# Patient Record
Sex: MALE | Race: WHITE | Employment: OTHER | URBAN - METROPOLITAN AREA
[De-identification: names, ages, dates, MRNs, and addresses within clinical notes are randomized per-mention and may not be internally consistent; named-entity substitution may affect disease eponyms.]

---

## 2017-06-21 PROBLEM — C44.92 SCCA (SQUAMOUS CELL CARCINOMA) OF SKIN: Status: ACTIVE | Noted: 2017-06-21

## 2017-06-21 PROBLEM — C44.92 SCCA (SQUAMOUS CELL CARCINOMA) OF SKIN: Status: RESOLVED | Noted: 2017-06-21 | Resolved: 2017-06-21

## 2017-06-21 PROBLEM — L57.0 AK (ACTINIC KERATOSIS): Status: ACTIVE | Noted: 2017-06-21

## 2018-01-26 ENCOUNTER — APPOINTMENT (OUTPATIENT)
Dept: GENERAL RADIOLOGY | Age: 83
DRG: 247 | End: 2018-01-26
Payer: MEDICARE

## 2018-01-26 ENCOUNTER — HOSPITAL ENCOUNTER (INPATIENT)
Age: 83
LOS: 2 days | Discharge: HOME | DRG: 247 | End: 2018-01-28
Attending: EMERGENCY MEDICINE | Admitting: INTERNAL MEDICINE
Payer: MEDICARE

## 2018-01-26 DIAGNOSIS — I21.3 ST ELEVATION MYOCARDIAL INFARCTION (STEMI), UNSPECIFIED ARTERY (HCC): Primary | ICD-10-CM

## 2018-01-26 LAB
ANION GAP SERPL CALCULATED.3IONS-SCNC: 13 MMOL/L (ref 9–17)
BNP INTERPRETATION: ABNORMAL
BUN BLDV-MCNC: 21 MG/DL (ref 8–23)
BUN/CREAT BLD: ABNORMAL (ref 9–20)
CALCIUM SERPL-MCNC: 9.4 MG/DL (ref 8.6–10.4)
CHLORIDE BLD-SCNC: 102 MMOL/L (ref 98–107)
CO2: 22 MMOL/L (ref 20–31)
CREAT SERPL-MCNC: 1.23 MG/DL (ref 0.7–1.2)
GFR AFRICAN AMERICAN: >60 ML/MIN
GFR NON-AFRICAN AMERICAN: 56 ML/MIN
GFR SERPL CREATININE-BSD FRML MDRD: ABNORMAL ML/MIN/{1.73_M2}
GFR SERPL CREATININE-BSD FRML MDRD: ABNORMAL ML/MIN/{1.73_M2}
GLUCOSE BLD-MCNC: 184 MG/DL (ref 70–99)
HCT VFR BLD CALC: 37.8 % (ref 40.7–50.3)
HEMOGLOBIN: 12.4 G/DL (ref 13–17)
MCH RBC QN AUTO: 30.8 PG (ref 25.2–33.5)
MCHC RBC AUTO-ENTMCNC: 32.8 G/DL (ref 28.4–34.8)
MCV RBC AUTO: 94 FL (ref 82.6–102.9)
NRBC AUTOMATED: 0 PER 100 WBC
PDW BLD-RTO: 11.9 % (ref 11.8–14.4)
PLATELET # BLD: 169 K/UL (ref 138–453)
PMV BLD AUTO: 11.5 FL (ref 8.1–13.5)
POC TROPONIN I: 0.44 NG/ML (ref 0–0.1)
POC TROPONIN INTERP: ABNORMAL
POTASSIUM SERPL-SCNC: 4.7 MMOL/L (ref 3.7–5.3)
PRO-BNP: 2404 PG/ML
RBC # BLD: 4.02 M/UL (ref 4.21–5.77)
SODIUM BLD-SCNC: 137 MMOL/L (ref 135–144)
TROPONIN INTERP: ABNORMAL
TROPONIN INTERP: ABNORMAL
TROPONIN T: 0.1 NG/ML
TROPONIN T: 0.26 NG/ML
WBC # BLD: 7.1 K/UL (ref 3.5–11.3)

## 2018-01-26 PROCEDURE — 93005 ELECTROCARDIOGRAM TRACING: CPT

## 2018-01-26 PROCEDURE — C1894 INTRO/SHEATH, NON-LASER: HCPCS

## 2018-01-26 PROCEDURE — 027136Z DILATION OF CORONARY ARTERY, TWO ARTERIES WITH THREE DRUG-ELUTING INTRALUMINAL DEVICES, PERCUTANEOUS APPROACH: ICD-10-PCS | Performed by: INTERNAL MEDICINE

## 2018-01-26 PROCEDURE — 36415 COLL VENOUS BLD VENIPUNCTURE: CPT

## 2018-01-26 PROCEDURE — 2000000000 HC ICU R&B

## 2018-01-26 PROCEDURE — B2111ZZ FLUOROSCOPY OF MULTIPLE CORONARY ARTERIES USING LOW OSMOLAR CONTRAST: ICD-10-PCS | Performed by: INTERNAL MEDICINE

## 2018-01-26 PROCEDURE — 71045 X-RAY EXAM CHEST 1 VIEW: CPT

## 2018-01-26 PROCEDURE — B2151ZZ FLUOROSCOPY OF LEFT HEART USING LOW OSMOLAR CONTRAST: ICD-10-PCS | Performed by: INTERNAL MEDICINE

## 2018-01-26 PROCEDURE — C1769 GUIDE WIRE: HCPCS

## 2018-01-26 PROCEDURE — C1874 STENT, COATED/COV W/DEL SYS: HCPCS

## 2018-01-26 PROCEDURE — 99285 EMERGENCY DEPT VISIT HI MDM: CPT

## 2018-01-26 PROCEDURE — 2500000003 HC RX 250 WO HCPCS

## 2018-01-26 PROCEDURE — C1760 CLOSURE DEV, VASC: HCPCS

## 2018-01-26 PROCEDURE — 2580000003 HC RX 258: Performed by: INTERNAL MEDICINE

## 2018-01-26 PROCEDURE — 4A023N7 MEASUREMENT OF CARDIAC SAMPLING AND PRESSURE, LEFT HEART, PERCUTANEOUS APPROACH: ICD-10-PCS | Performed by: INTERNAL MEDICINE

## 2018-01-26 PROCEDURE — C1887 CATHETER, GUIDING: HCPCS

## 2018-01-26 PROCEDURE — C9606 PERC D-E COR REVASC W AMI S: HCPCS | Performed by: INTERNAL MEDICINE

## 2018-01-26 PROCEDURE — 83880 ASSAY OF NATRIURETIC PEPTIDE: CPT

## 2018-01-26 PROCEDURE — 80061 LIPID PANEL: CPT

## 2018-01-26 PROCEDURE — 6370000000 HC RX 637 (ALT 250 FOR IP): Performed by: INTERNAL MEDICINE

## 2018-01-26 PROCEDURE — 85027 COMPLETE CBC AUTOMATED: CPT

## 2018-01-26 PROCEDURE — 6370000000 HC RX 637 (ALT 250 FOR IP)

## 2018-01-26 PROCEDURE — 84484 ASSAY OF TROPONIN QUANT: CPT

## 2018-01-26 PROCEDURE — 80048 BASIC METABOLIC PNL TOTAL CA: CPT

## 2018-01-26 PROCEDURE — C1725 CATH, TRANSLUMIN NON-LASER: HCPCS

## 2018-01-26 PROCEDURE — 6360000002 HC RX W HCPCS

## 2018-01-26 PROCEDURE — 93458 L HRT ARTERY/VENTRICLE ANGIO: CPT | Performed by: INTERNAL MEDICINE

## 2018-01-26 RX ORDER — SODIUM CHLORIDE 0.9 % (FLUSH) 0.9 %
10 SYRINGE (ML) INJECTION PRN
Status: DISCONTINUED | OUTPATIENT
Start: 2018-01-26 | End: 2018-01-28 | Stop reason: HOSPADM

## 2018-01-26 RX ORDER — FLUTICASONE PROPIONATE 50 MCG
1 SPRAY, SUSPENSION (ML) NASAL DAILY
Status: DISCONTINUED | OUTPATIENT
Start: 2018-01-27 | End: 2018-01-28 | Stop reason: HOSPADM

## 2018-01-26 RX ORDER — SODIUM CHLORIDE 9 MG/ML
INJECTION, SOLUTION INTRAVENOUS CONTINUOUS
Status: ACTIVE | OUTPATIENT
Start: 2018-01-26 | End: 2018-01-27

## 2018-01-26 RX ORDER — LORAZEPAM 0.5 MG/1
0.5 TABLET ORAL EVERY 6 HOURS PRN
Status: DISCONTINUED | OUTPATIENT
Start: 2018-01-26 | End: 2018-01-28 | Stop reason: HOSPADM

## 2018-01-26 RX ORDER — CLOPIDOGREL BISULFATE 75 MG/1
75 TABLET ORAL DAILY
Status: DISCONTINUED | OUTPATIENT
Start: 2018-01-27 | End: 2018-01-28 | Stop reason: HOSPADM

## 2018-01-26 RX ORDER — ACETAMINOPHEN 325 MG/1
650 TABLET ORAL EVERY 4 HOURS PRN
Status: DISCONTINUED | OUTPATIENT
Start: 2018-01-26 | End: 2018-01-28 | Stop reason: HOSPADM

## 2018-01-26 RX ORDER — AMLODIPINE BESYLATE 5 MG/1
5 TABLET ORAL DAILY
Status: DISCONTINUED | OUTPATIENT
Start: 2018-01-27 | End: 2018-01-28 | Stop reason: HOSPADM

## 2018-01-26 RX ORDER — ATORVASTATIN CALCIUM 80 MG/1
80 TABLET, FILM COATED ORAL NIGHTLY
Status: DISCONTINUED | OUTPATIENT
Start: 2018-01-26 | End: 2018-01-28 | Stop reason: HOSPADM

## 2018-01-26 RX ORDER — LISINOPRIL AND HYDROCHLOROTHIAZIDE 20; 12.5 MG/1; MG/1
1 TABLET ORAL DAILY
Status: DISCONTINUED | OUTPATIENT
Start: 2018-01-27 | End: 2018-01-28 | Stop reason: HOSPADM

## 2018-01-26 RX ORDER — ONDANSETRON 2 MG/ML
4 INJECTION INTRAMUSCULAR; INTRAVENOUS EVERY 6 HOURS PRN
Status: DISCONTINUED | OUTPATIENT
Start: 2018-01-26 | End: 2018-01-28 | Stop reason: HOSPADM

## 2018-01-26 RX ORDER — MECLIZINE HCL 12.5 MG/1
12.5 TABLET ORAL 3 TIMES DAILY PRN
Status: DISCONTINUED | OUTPATIENT
Start: 2018-01-26 | End: 2018-01-28 | Stop reason: HOSPADM

## 2018-01-26 RX ORDER — LANOLIN ALCOHOL/MO/W.PET/CERES
325 CREAM (GRAM) TOPICAL DAILY
Status: DISCONTINUED | OUTPATIENT
Start: 2018-01-27 | End: 2018-01-28 | Stop reason: HOSPADM

## 2018-01-26 RX ORDER — METOPROLOL SUCCINATE 25 MG/1
25 TABLET, EXTENDED RELEASE ORAL DAILY
Status: DISCONTINUED | OUTPATIENT
Start: 2018-01-27 | End: 2018-01-28 | Stop reason: HOSPADM

## 2018-01-26 RX ORDER — INSULIN GLARGINE 100 [IU]/ML
15 INJECTION, SOLUTION SUBCUTANEOUS DAILY
Status: DISCONTINUED | OUTPATIENT
Start: 2018-01-27 | End: 2018-01-28 | Stop reason: HOSPADM

## 2018-01-26 RX ORDER — ASPIRIN 81 MG/1
81 TABLET, CHEWABLE ORAL DAILY
Status: DISCONTINUED | OUTPATIENT
Start: 2018-01-27 | End: 2018-01-28 | Stop reason: HOSPADM

## 2018-01-26 RX ORDER — SODIUM CHLORIDE 0.9 % (FLUSH) 0.9 %
10 SYRINGE (ML) INJECTION EVERY 12 HOURS SCHEDULED
Status: DISCONTINUED | OUTPATIENT
Start: 2018-01-26 | End: 2018-01-28 | Stop reason: HOSPADM

## 2018-01-26 RX ADMIN — Medication 10 ML: at 22:48

## 2018-01-26 RX ADMIN — ATORVASTATIN CALCIUM 80 MG: 80 TABLET, FILM COATED ORAL at 23:35

## 2018-01-26 RX ADMIN — SODIUM CHLORIDE: 9 INJECTION, SOLUTION INTRAVENOUS at 22:47

## 2018-01-26 ASSESSMENT — ENCOUNTER SYMPTOMS
NAUSEA: 1
SHORTNESS OF BREATH: 0
ABDOMINAL PAIN: 0
VOMITING: 0

## 2018-01-27 LAB
ANION GAP SERPL CALCULATED.3IONS-SCNC: 14 MMOL/L (ref 9–17)
BUN BLDV-MCNC: 20 MG/DL (ref 8–23)
BUN/CREAT BLD: ABNORMAL (ref 9–20)
CALCIUM SERPL-MCNC: 8.4 MG/DL (ref 8.6–10.4)
CHLORIDE BLD-SCNC: 105 MMOL/L (ref 98–107)
CHOLESTEROL/HDL RATIO: 2.1
CHOLESTEROL: 109 MG/DL
CO2: 18 MMOL/L (ref 20–31)
CREAT SERPL-MCNC: 1.13 MG/DL (ref 0.7–1.2)
EKG ATRIAL RATE: 56 BPM
EKG ATRIAL RATE: 84 BPM
EKG P AXIS: 41 DEGREES
EKG P AXIS: 52 DEGREES
EKG P-R INTERVAL: 158 MS
EKG P-R INTERVAL: 162 MS
EKG Q-T INTERVAL: 354 MS
EKG Q-T INTERVAL: 426 MS
EKG QRS DURATION: 84 MS
EKG QRS DURATION: 98 MS
EKG QTC CALCULATION (BAZETT): 411 MS
EKG QTC CALCULATION (BAZETT): 418 MS
EKG R AXIS: 54 DEGREES
EKG R AXIS: 61 DEGREES
EKG T AXIS: 53 DEGREES
EKG T AXIS: 75 DEGREES
EKG VENTRICULAR RATE: 56 BPM
EKG VENTRICULAR RATE: 84 BPM
GFR AFRICAN AMERICAN: >60 ML/MIN
GFR NON-AFRICAN AMERICAN: >60 ML/MIN
GFR SERPL CREATININE-BSD FRML MDRD: ABNORMAL ML/MIN/{1.73_M2}
GFR SERPL CREATININE-BSD FRML MDRD: ABNORMAL ML/MIN/{1.73_M2}
GLUCOSE BLD-MCNC: 111 MG/DL (ref 70–99)
GLUCOSE BLD-MCNC: 130 MG/DL (ref 75–110)
GLUCOSE BLD-MCNC: 158 MG/DL (ref 75–110)
GLUCOSE BLD-MCNC: 196 MG/DL (ref 75–110)
GLUCOSE BLD-MCNC: 225 MG/DL (ref 75–110)
HCT VFR BLD CALC: 33.5 % (ref 40.7–50.3)
HDLC SERPL-MCNC: 53 MG/DL
HEMOGLOBIN: 10.4 G/DL (ref 13–17)
LDL CHOLESTEROL: 39 MG/DL (ref 0–130)
LV EF: 50 %
LVEF MODALITY: NORMAL
MCH RBC QN AUTO: 31 PG (ref 25.2–33.5)
MCHC RBC AUTO-ENTMCNC: 31 G/DL (ref 28.4–34.8)
MCV RBC AUTO: 99.7 FL (ref 82.6–102.9)
NRBC AUTOMATED: 0 PER 100 WBC
PDW BLD-RTO: 12 % (ref 11.8–14.4)
PLATELET # BLD: 157 K/UL (ref 138–453)
PMV BLD AUTO: 11.7 FL (ref 8.1–13.5)
POTASSIUM SERPL-SCNC: 5.1 MMOL/L (ref 3.7–5.3)
RBC # BLD: 3.36 M/UL (ref 4.21–5.77)
SODIUM BLD-SCNC: 137 MMOL/L (ref 135–144)
TRIGL SERPL-MCNC: 87 MG/DL
TROPONIN INTERP: ABNORMAL
TROPONIN T: 0.53 NG/ML
TROPONIN T: 0.55 NG/ML
TROPONIN T: 0.62 NG/ML
VLDLC SERPL CALC-MCNC: NORMAL MG/DL (ref 1–30)
WBC # BLD: 6.8 K/UL (ref 3.5–11.3)

## 2018-01-27 PROCEDURE — 2060000000 HC ICU INTERMEDIATE R&B

## 2018-01-27 PROCEDURE — 84484 ASSAY OF TROPONIN QUANT: CPT

## 2018-01-27 PROCEDURE — 6370000000 HC RX 637 (ALT 250 FOR IP): Performed by: INTERNAL MEDICINE

## 2018-01-27 PROCEDURE — 85027 COMPLETE CBC AUTOMATED: CPT

## 2018-01-27 PROCEDURE — 2580000003 HC RX 258: Performed by: INTERNAL MEDICINE

## 2018-01-27 PROCEDURE — 36415 COLL VENOUS BLD VENIPUNCTURE: CPT

## 2018-01-27 PROCEDURE — 82947 ASSAY GLUCOSE BLOOD QUANT: CPT

## 2018-01-27 PROCEDURE — 93306 TTE W/DOPPLER COMPLETE: CPT

## 2018-01-27 PROCEDURE — 80048 BASIC METABOLIC PNL TOTAL CA: CPT

## 2018-01-27 RX ORDER — DEXTROSE MONOHYDRATE 25 G/50ML
12.5 INJECTION, SOLUTION INTRAVENOUS PRN
Status: DISCONTINUED | OUTPATIENT
Start: 2018-01-27 | End: 2018-01-28 | Stop reason: HOSPADM

## 2018-01-27 RX ORDER — NICOTINE POLACRILEX 4 MG
15 LOZENGE BUCCAL PRN
Status: DISCONTINUED | OUTPATIENT
Start: 2018-01-27 | End: 2018-01-28 | Stop reason: HOSPADM

## 2018-01-27 RX ORDER — DEXTROSE MONOHYDRATE 50 MG/ML
100 INJECTION, SOLUTION INTRAVENOUS PRN
Status: DISCONTINUED | OUTPATIENT
Start: 2018-01-27 | End: 2018-01-28 | Stop reason: HOSPADM

## 2018-01-27 RX ADMIN — Medication 10 ML: at 08:15

## 2018-01-27 RX ADMIN — FERROUS SULFATE TAB EC 325 MG (65 MG FE EQUIVALENT) 325 MG: 325 (65 FE) TABLET DELAYED RESPONSE at 08:14

## 2018-01-27 RX ADMIN — INSULIN GLARGINE 15 UNITS: 100 INJECTION, SOLUTION SUBCUTANEOUS at 08:16

## 2018-01-27 RX ADMIN — ASPIRIN 81 MG: 81 TABLET, CHEWABLE ORAL at 08:15

## 2018-01-27 RX ADMIN — METOPROLOL SUCCINATE 25 MG: 25 TABLET, FILM COATED, EXTENDED RELEASE ORAL at 08:14

## 2018-01-27 RX ADMIN — FLUTICASONE PROPIONATE 1 SPRAY: 50 SPRAY, METERED NASAL at 08:14

## 2018-01-27 RX ADMIN — CLOPIDOGREL 75 MG: 75 TABLET, FILM COATED ORAL at 08:15

## 2018-01-27 RX ADMIN — AMLODIPINE BESYLATE 5 MG: 5 TABLET ORAL at 08:15

## 2018-01-27 RX ADMIN — Medication 10 ML: at 20:17

## 2018-01-27 RX ADMIN — LISINOPRIL AND HYDROCHLOROTHIAZIDE 1 TABLET: 12.5; 2 TABLET ORAL at 08:14

## 2018-01-27 RX ADMIN — RIVAROXABAN 20 MG: 20 TABLET, FILM COATED ORAL at 08:14

## 2018-01-27 RX ADMIN — ATORVASTATIN CALCIUM 80 MG: 80 TABLET, FILM COATED ORAL at 20:16

## 2018-01-27 NOTE — CARE COORDINATION
Case Management Initial Discharge Plan  Brandon Foster,         Readmission Risk              Readmission Risk:        1       Age 72 or Greater:  1    Admitted from SNF or Requires Paid or Family Care:      Currently has CHF,COPD,ARF,CRI,or is on dialysis:      Takes more than 5 Prescription Medications:      Takes Digoxin,Insulin,Anticoagulants,Narcotics or ASA/Plavix:      Hospital Admit in Past 12 Months:      On Disability:      Patient Considers own Health:              Met with:patient to discuss discharge plans.    Information verified: address, contacts, phone number, , insurance Yes  PCP: Lia Castillo MD  Date of last visit: judy 15 th  AMI Zone paper: yes  Insurance Provider: medicare/[employer]    Discharge Planning  Current Residence:   private  Living Arrangements:    lives with wife  Home has 1 story  Support Systems:   family  Current Services PTA:    none  Patient able to perform ADL's:Independent  DME used to aid ambulation prior to admission: none/during admission none    Potential Assistance Needed:   no needs identified today    Pharmacy: [facility]   Potential Assistance Purchasing Medications:   no    Patient agreeable to home care: no skilled needs  Freedom of choice provided:  n/a      Patient expects to be discharged to:   home    Prior SNF/Rehab Placement and Facility: none  Agreeable to SNF/Rehab: n/a  [place] of choice provided: n/a   Evaluation: no      Transportation provider: family  Transportation arrangements needed for discharge: No    Discharge Plan: home independently        Electronically signed by Hien Zaidi RN on 18 at 11:04 AM

## 2018-01-27 NOTE — ED PROVIDER NOTES
[facility]     Emergency Department     Faculty Attestation    I performed a history and physical examination of the patient and discussed management with the resident. I reviewed the residents note and agree with the documented findings and plan of care. Any areas of disagreement are noted on the chart. I was personally present for the key portions of any procedures. I have documented in the chart those procedures where I was not present during the key portions. I have reviewed the emergency nurses triage note. I agree with the chief complaint, past medical history, past surgical history, allergies, medications, social and family history as documented unless otherwise noted below. For Physician Assistant/ Nurse Practitioner cases/documentation I have personally evaluated this patient and have completed at least one if not all key elements of the E/M (history, physical exam, and MDM). Additional findings are as noted. Chest clear,  Heart exam normal , no pain or swelling on examination of the lower extremities , equal pulses both wrists , trachea midline. Abdomen is nontender without pulsatile mass or bruit. Chest pain did radiate \"a little bit\" to the back , the pain is not pleuritic. Patient appears comfortable in no distress, skin is warm and dry. The patient is pain-free at this time. STEMI called.     Salina Hernandez MD [facility]  Attending Physician     EKG Interpretation    Interpreted by me    Rhythm: normal sinus   Rate: normal/84  Axis: normal 61  Ectopy: none  Conduction: normal  ST Segments: Inferior STEMI  T Waves: no acute change  Q Waves: Inferior    Clinical Impression: Inferior STEMI              Linda Wang MD  01/26/18 2009
exhibits no edema or tenderness. No lower extremity swelling, calf pain, asymmetry. Neurological: He is alert and oriented to person, place, and time. Skin: Skin is warm and dry. No rash noted. He is not diaphoretic. No erythema. Psychiatric: He has a normal mood and affect. His behavior is normal.   Nursing note and vitals reviewed. DIFFERENTIAL  DIAGNOSIS     PLAN (LABS / IMAGING / EKG):  Orders Placed This Encounter   Procedures    XR CHEST PORTABLE    CBC    Basic Metabolic Panel    Troponin    Brain Natriuretic Peptide    Telemetry monitoring    Inpatient consult to Cardiology    Pulse oximetry, continuous    POCT troponin    POCT troponin    EKG 12 Lead       MEDICATIONS ORDERED:  No orders of the defined types were placed in this encounter.       DDX: STEMI, dissection, pneumonia, pneumothorax    DIAGNOSTIC RESULTS / EMERGENCY DEPARTMENT COURSE / MDM     LABS:  Results for orders placed or performed during the hospital encounter of 01/26/18   CBC   Result Value Ref Range    WBC 7.1 3.5 - 11.3 k/uL    RBC 4.02 (L) 4.21 - 5.77 m/uL    Hemoglobin 12.4 (L) 13.0 - 17.0 g/dL    Hematocrit 37.8 (L) 40.7 - 50.3 %    MCV 94.0 82.6 - 102.9 fL    MCH 30.8 25.2 - 33.5 pg    MCHC 32.8 28.4 - 34.8 g/dL    RDW 11.9 11.8 - 14.4 %    Platelets 859 139 - 148 k/uL    MPV 11.5 8.1 - 13.5 fL    NRBC Automated 0.0 0.0 per 100 WBC   Basic Metabolic Panel   Result Value Ref Range    Glucose 184 (H) 70 - 99 mg/dL    BUN 21 8 - 23 mg/dL    CREATININE 1.23 (H) 0.70 - 1.20 mg/dL    Bun/Cre Ratio NOT REPORTED 9 - 20    Calcium 9.4 8.6 - 10.4 mg/dL    Sodium 137 135 - 144 mmol/L    Potassium 4.7 3.7 - 5.3 mmol/L    Chloride 102 98 - 107 mmol/L    CO2 22 20 - 31 mmol/L    Anion Gap 13 9 - 17 mmol/L    GFR Non-African American 56 (L) >60 mL/min    GFR African American >60 >60 mL/min    GFR Comment          GFR Staging NOT REPORTED    Troponin   Result Value Ref Range    Troponin T 0.10 (HH) <0.03 ng/mL    Troponin

## 2018-01-27 NOTE — FLOWSHEET NOTE
[address] [facility]     Emergency/Trauma Note    PATIENT NAME: Mahnaz Green    Shift date: 01/26/18  Shift day: Friday   Shift # 2    Room # 12/12   Name: Mahnaz Green            Age: 80 y.o. Gender: male          Orthodox: Voodoo   Place of Hinduism: [place] in [facility]    Trauma/Incident type: [address] Date & Time: 1/26/2018  8:01 PM        PATIENT/EVENT DESCRIPTION:  Mahnaz Green is a 80 y.o. male who arrived via EMS from scene as a Stemi Alert. Pt to be admitted to 12/12. SPIRITUAL ASSESSMENT/INTERVENTION:   received information from EMS concerning his name and that his family was on the way.  went out to the lobby and found the family and with the permission of the doctor brought the family into the ED to see the Patient. The family consisted of his son Joe and his Debjames Valencia and the patient's wife Radha French. The son's phone number is [phone]. The all live in [facility]. Patient was taken to Cath Lab and  took the family to the Cath Lab waiting room and prayed with them.  talked to nurse about the time required for procedure and passed it along to the family. PATIENT BELONGINGS:  No belongings noted      REGISTRATION STAFF NOTIFIED? YES      WHAT IS YOUR SPIRITUAL CARE PLAN FOR THIS PATIENT?:   Chaplains will remain ready to provide spiritual and emotional comfort to the family.     Electronically signed by Kasandra Hector, [address] [place] [address], on 1/26/2018 at 8:05 PM.

## 2018-01-27 NOTE — FLOWSHEET NOTE
01/26/18 2004   Encounter Summary   Services provided to: Patient; Family   Referral/Consult From: Multi-disciplinary team   Support System Family members   Place of [facility] [place]   Contact Taoist No   Continue Visiting (01/26/18)   Complexity of Encounter High   Length of Encounter 2 hours   Spiritual Assessment Completed Yes   Crisis   Type Stemi Alert   Assessment Anxious   Intervention Active listening;Explored feelings, thoughts, concerns;Prayer;Sustaining presence/ Ministry of presence   Outcome Expressed gratitude   Spiritual/Samaritan   Type Spiritual struggle   Assessment Anxious   Intervention Active listening;Explored feelings, thoughts, concerns;Nurtured hope;Prayer;Sustaining presence/ Ministry of presence   Outcome Expressed gratitude

## 2018-01-27 NOTE — H&P
(PRINZIDE;ZESTORETIC) 20-12.5 MG per tablet TAKE 1 TABLET DAILY 10/23/17  Yes Tammy Cast MD   Accu-Chek Multiclix Lancets MISC USE TWICE DAILY 10/19/17  Yes Tammy Cast MD   LORazepam (ATIVAN) 0.5 MG tablet TAKE ONE TABLET BY MOUTH EVERY 6 HOURS AS NEEDED FOR ANXIETY 10/11/17  Yes Tammy Cast MD   insulin glargine (LANTUS SOLOSTAR) 100 UNIT/ML injection pen Inject 15 Units into the skin daily 10/9/17  Yes Tammy Cast MD   amLODIPine (NORVASC) 5 MG tablet TAKE 1 TABLET DAILY 6/27/17  Yes Tammy Cast MD   rivaroxaban (XARELTO) 20 MG TABS tablet Take 1 tablet by mouth daily 4/24/17  Yes Tammy Cast MD   FREESTYLE TEST STRIPS strip TEST TWICE DAILY 4/10/17  Yes Tammy Cast MD   metFORMIN (GLUCOPHAGE) 1000 MG tablet Take 1 tablet by mouth 2 times daily (with meals) 3/23/17  Yes Tammy Cast MD   Insulin Pen Needle (B-D ULTRAFINE III SHORT PEN) 31G X 8 MM MISC 1 each by Does not apply route daily 3/23/17  Yes Tammy Cast MD   aspirin 81 MG chewable tablet Take 1 tablet by mouth daily 10/6/15  Yes Renny Hansen MD   Coenzyme Q10 (CO Q-10) 30 MG CAPS Take 30 mg by mouth daily   Yes Historical Provider, MD   Ascorbic Acid (VITAMIN C) 250 MG tablet Take 250 mg by mouth daily   Yes Historical Provider, MD   ferrous sulfate 325 (65 FE) MG tablet Take 325 mg by mouth daily    Yes Historical Provider, MD   fluticasone (FLONASE) 50 MCG/ACT nasal spray 1 spray by Nasal route daily 1/15/18   Tammy Cast MD   fluorouracil (EFUDEX) 5 % cream Apply topically 2 times daily. 6/21/17   Richad Nations, PA   meclizine (ANTIVERT) 12.5 MG tablet Take 1 tablet by mouth 3 times daily as needed for Dizziness 3/27/17   Tammy Cast MD      No current facility-administered medications for this encounter. Allergies:  Sulfa antibiotics    Social History:   reports that he has quit smoking.  He has never used smokeless tobacco. He reports that he does not drink alcohol or use drugs. Family History: family history includes Cancer in his mother; Heart Disease in his father; High Blood Pressure in his brother; Other in his mother. REVIEW OF SYSTEMS:    · Constitutional: there has been no unanticipated weight loss. There's been No change in energy level, No change in activity level. · Eyes: No visual changes or diplopia. No scleral icterus. · ENT: No Headaches  · Cardiovascular: As mentioned in HPI  · Respiratory: As mentioned in HPI  · Gastrointestinal: No abdominal pain. No change in bowel or bladder habits. · Genitourinary: No dysuria, trouble voiding, or hematuria. · Musculoskeletal:  No gait disturbance, No weakness or joint complaints. · Integumentary: No rash or pruritis. · Neurological: No headache, diplopia, change in muscle strength, numbness or tingling. No change in gait, balance, coordination, mood, affect, memory, mentation, behavior. · Psychiatric: No anxiety, or depression. · Endocrine: No temperature intolerance. No excessive thirst, fluid intake, or urination. No tremor. · Hematologic/Lymphatic: No abnormal bruising or bleeding, blood clots or swollen lymph nodes. · Allergic/Immunologic: No nasal congestion or hives. PHYSICAL EXAM:      BP (!) 151/52   Pulse 94   Temp 99.2 °F (37.3 °C) (Oral)   Resp 13   Ht 5' 10\" (1.778 m)   Wt 180 lb (81.6 kg)   SpO2 99%   BMI 25.83 kg/m²    Constitutional and General Appearance: alert, cooperative, no distress and appears stated age  HEENT: PERRL, no cervical lymphadenopathy. No masses palpable. Normal oral mucosa  Respiratory:  · Normal excursion and expansion without use of accessory muscles  · Resp Auscultation: Good respiratory effort. No for increased work of breathing.  On auscultation: clear to auscultation bilaterally  Cardiovascular:  · The apical impulse is not displaced  · Heart tones are crisp and normal. regular S1 and S2.  · Jugular

## 2018-01-28 VITALS
SYSTOLIC BLOOD PRESSURE: 108 MMHG | HEART RATE: 60 BPM | HEIGHT: 70 IN | BODY MASS INDEX: 25.77 KG/M2 | RESPIRATION RATE: 16 BRPM | WEIGHT: 180 LBS | TEMPERATURE: 97.7 F | DIASTOLIC BLOOD PRESSURE: 52 MMHG | OXYGEN SATURATION: 100 %

## 2018-01-28 LAB
GLUCOSE BLD-MCNC: 124 MG/DL (ref 75–110)
GLUCOSE BLD-MCNC: 195 MG/DL (ref 75–110)
TROPONIN INTERP: ABNORMAL
TROPONIN T: 0.48 NG/ML

## 2018-01-28 PROCEDURE — 36415 COLL VENOUS BLD VENIPUNCTURE: CPT

## 2018-01-28 PROCEDURE — 2580000003 HC RX 258: Performed by: INTERNAL MEDICINE

## 2018-01-28 PROCEDURE — 6370000000 HC RX 637 (ALT 250 FOR IP): Performed by: INTERNAL MEDICINE

## 2018-01-28 PROCEDURE — 84484 ASSAY OF TROPONIN QUANT: CPT

## 2018-01-28 PROCEDURE — 82947 ASSAY GLUCOSE BLOOD QUANT: CPT

## 2018-01-28 PROCEDURE — 94762 N-INVAS EAR/PLS OXIMTRY CONT: CPT

## 2018-01-28 RX ORDER — METOPROLOL SUCCINATE 25 MG/1
25 TABLET, EXTENDED RELEASE ORAL DAILY
Qty: 30 TABLET | Refills: 3 | Status: SHIPPED | OUTPATIENT
Start: 2018-01-29 | End: 2018-12-27 | Stop reason: SDUPTHER

## 2018-01-28 RX ORDER — ATORVASTATIN CALCIUM 80 MG/1
80 TABLET, FILM COATED ORAL NIGHTLY
Qty: 30 TABLET | Refills: 3 | Status: SHIPPED | OUTPATIENT
Start: 2018-01-28 | End: 2019-02-18 | Stop reason: SDUPTHER

## 2018-01-28 RX ORDER — NITROGLYCERIN 0.4 MG/1
0.4 TABLET SUBLINGUAL EVERY 5 MIN PRN
Qty: 25 TABLET | Refills: 1 | Status: SHIPPED | OUTPATIENT
Start: 2018-01-28

## 2018-01-28 RX ORDER — CLOPIDOGREL BISULFATE 75 MG/1
75 TABLET ORAL DAILY
Qty: 30 TABLET | Refills: 11 | Status: SHIPPED | OUTPATIENT
Start: 2018-01-29 | End: 2019-01-22 | Stop reason: SDUPTHER

## 2018-01-28 RX ADMIN — AMLODIPINE BESYLATE 5 MG: 5 TABLET ORAL at 08:08

## 2018-01-28 RX ADMIN — CLOPIDOGREL 75 MG: 75 TABLET, FILM COATED ORAL at 08:07

## 2018-01-28 RX ADMIN — LISINOPRIL AND HYDROCHLOROTHIAZIDE 1 TABLET: 12.5; 2 TABLET ORAL at 08:07

## 2018-01-28 RX ADMIN — FLUTICASONE PROPIONATE 1 SPRAY: 50 SPRAY, METERED NASAL at 08:08

## 2018-01-28 RX ADMIN — Medication 10 ML: at 08:11

## 2018-01-28 RX ADMIN — FERROUS SULFATE TAB EC 325 MG (65 MG FE EQUIVALENT) 325 MG: 325 (65 FE) TABLET DELAYED RESPONSE at 08:07

## 2018-01-28 RX ADMIN — ASPIRIN 81 MG: 81 TABLET, CHEWABLE ORAL at 08:07

## 2018-01-28 RX ADMIN — INSULIN GLARGINE 15 UNITS: 100 INJECTION, SOLUTION SUBCUTANEOUS at 08:11

## 2018-01-28 RX ADMIN — RIVAROXABAN 20 MG: 20 TABLET, FILM COATED ORAL at 08:07

## 2018-01-28 NOTE — PLAN OF CARE
Problem: Falls - Risk of  Goal: Absence of falls  Outcome: Ongoing      Problem: Risk for Impaired Skin Integrity  Goal: Tissue integrity - skin and mucous membranes  Structural intactness and normal physiological function of skin and  mucous membranes.    Outcome: Ongoing      Problem: Pain:  Goal: Pain level will decrease  Pain level will decrease   Outcome: Ongoing      Comments: Electronically signed by Geetha Cerda RN on 1/28/2018 at 12:19 AM

## 2018-12-13 RX ORDER — LANCETS
EACH MISCELLANEOUS
Qty: 204 EACH | Refills: 3 | Status: SHIPPED | OUTPATIENT
Start: 2018-12-13 | End: 2019-01-02 | Stop reason: ALTCHOICE

## 2018-12-19 DIAGNOSIS — F41.9 ANXIETY: ICD-10-CM

## 2018-12-19 RX ORDER — LORAZEPAM 0.5 MG/1
TABLET ORAL
Qty: 60 TABLET | Refills: 0 | Status: SHIPPED | OUTPATIENT
Start: 2018-12-19 | End: 2019-03-04 | Stop reason: SDUPTHER

## 2018-12-27 RX ORDER — METOPROLOL SUCCINATE 25 MG/1
25 TABLET, EXTENDED RELEASE ORAL DAILY
Qty: 30 TABLET | Refills: 3 | Status: SHIPPED | OUTPATIENT
Start: 2018-12-27 | End: 2019-01-02 | Stop reason: SDUPTHER

## 2019-01-02 DIAGNOSIS — E11.40 CONTROLLED TYPE 2 DIABETES WITH NEUROPATHY (HCC): ICD-10-CM

## 2019-01-02 DIAGNOSIS — Z79.4 TYPE 2 DIABETES MELLITUS WITHOUT COMPLICATION, WITH LONG-TERM CURRENT USE OF INSULIN (HCC): Primary | ICD-10-CM

## 2019-01-02 DIAGNOSIS — E11.9 TYPE 2 DIABETES MELLITUS WITHOUT COMPLICATION, WITH LONG-TERM CURRENT USE OF INSULIN (HCC): Primary | ICD-10-CM

## 2019-01-02 RX ORDER — METOPROLOL SUCCINATE 25 MG/1
25 TABLET, EXTENDED RELEASE ORAL DAILY
Qty: 30 TABLET | Refills: 3 | Status: SHIPPED | OUTPATIENT
Start: 2019-01-02 | End: 2019-01-03 | Stop reason: SDUPTHER

## 2019-01-02 RX ORDER — LANCING DEVICE
1 EACH MISCELLANEOUS 2 TIMES DAILY
Qty: 200 EACH | Refills: 2 | OUTPATIENT
Start: 2019-01-02 | End: 2019-02-18

## 2019-01-03 RX ORDER — METOPROLOL SUCCINATE 25 MG/1
25 TABLET, EXTENDED RELEASE ORAL DAILY
Qty: 90 TABLET | Refills: 1 | Status: SHIPPED | OUTPATIENT
Start: 2019-01-03 | End: 2019-08-07 | Stop reason: SDUPTHER

## 2019-01-21 ENCOUNTER — PATIENT MESSAGE (OUTPATIENT)
Dept: PRIMARY CARE CLINIC | Age: 84
End: 2019-01-21

## 2019-01-22 RX ORDER — CLOPIDOGREL BISULFATE 75 MG/1
75 TABLET ORAL DAILY
Qty: 90 TABLET | Refills: 3 | Status: SHIPPED | OUTPATIENT
Start: 2019-01-22 | End: 2019-12-21 | Stop reason: SDUPTHER

## 2019-01-24 DIAGNOSIS — Z79.4 TYPE 2 DIABETES MELLITUS WITHOUT COMPLICATION, WITH LONG-TERM CURRENT USE OF INSULIN (HCC): ICD-10-CM

## 2019-01-24 DIAGNOSIS — E11.9 TYPE 2 DIABETES MELLITUS WITHOUT COMPLICATION, WITH LONG-TERM CURRENT USE OF INSULIN (HCC): ICD-10-CM

## 2019-02-18 ENCOUNTER — OFFICE VISIT (OUTPATIENT)
Dept: PRIMARY CARE CLINIC | Age: 84
End: 2019-02-18
Payer: MEDICARE

## 2019-02-18 VITALS
HEART RATE: 64 BPM | BODY MASS INDEX: 27.46 KG/M2 | OXYGEN SATURATION: 98 % | SYSTOLIC BLOOD PRESSURE: 134 MMHG | WEIGHT: 191.4 LBS | DIASTOLIC BLOOD PRESSURE: 64 MMHG

## 2019-02-18 DIAGNOSIS — Z79.4 TYPE 2 DIABETES MELLITUS WITHOUT COMPLICATION, WITH LONG-TERM CURRENT USE OF INSULIN (HCC): Primary | ICD-10-CM

## 2019-02-18 DIAGNOSIS — I10 ESSENTIAL HYPERTENSION: ICD-10-CM

## 2019-02-18 DIAGNOSIS — E11.9 TYPE 2 DIABETES MELLITUS WITHOUT COMPLICATION, WITH LONG-TERM CURRENT USE OF INSULIN (HCC): Primary | ICD-10-CM

## 2019-02-18 DIAGNOSIS — E11.319 DIABETIC RETINOPATHY OF BOTH EYES WITHOUT MACULAR EDEMA ASSOCIATED WITH TYPE 2 DIABETES MELLITUS, UNSPECIFIED RETINOPATHY SEVERITY (HCC): ICD-10-CM

## 2019-02-18 DIAGNOSIS — I20.9 ANGINA PECTORIS (HCC): ICD-10-CM

## 2019-02-18 PROBLEM — I21.3 ST ELEVATION (STEMI) MYOCARDIAL INFARCTION (HCC): Status: RESOLVED | Noted: 2018-01-26 | Resolved: 2019-02-18

## 2019-02-18 LAB — HBA1C MFR BLD: 7.4 %

## 2019-02-18 PROCEDURE — G8417 CALC BMI ABV UP PARAM F/U: HCPCS | Performed by: FAMILY MEDICINE

## 2019-02-18 PROCEDURE — 83036 HEMOGLOBIN GLYCOSYLATED A1C: CPT | Performed by: FAMILY MEDICINE

## 2019-02-18 PROCEDURE — G8598 ASA/ANTIPLAT THER USED: HCPCS | Performed by: FAMILY MEDICINE

## 2019-02-18 PROCEDURE — G8427 DOCREV CUR MEDS BY ELIG CLIN: HCPCS | Performed by: FAMILY MEDICINE

## 2019-02-18 PROCEDURE — 1101F PT FALLS ASSESS-DOCD LE1/YR: CPT | Performed by: FAMILY MEDICINE

## 2019-02-18 PROCEDURE — 99214 OFFICE O/P EST MOD 30 MIN: CPT | Performed by: FAMILY MEDICINE

## 2019-02-18 PROCEDURE — 1036F TOBACCO NON-USER: CPT | Performed by: FAMILY MEDICINE

## 2019-02-18 PROCEDURE — 4040F PNEUMOC VAC/ADMIN/RCVD: CPT | Performed by: FAMILY MEDICINE

## 2019-02-18 PROCEDURE — 1123F ACP DISCUSS/DSCN MKR DOCD: CPT | Performed by: FAMILY MEDICINE

## 2019-02-18 PROCEDURE — G8482 FLU IMMUNIZE ORDER/ADMIN: HCPCS | Performed by: FAMILY MEDICINE

## 2019-02-18 RX ORDER — SYRING-NEEDL,DISP,INSUL,0.3 ML 30 GX5/16"
1 SYRINGE, EMPTY DISPOSABLE MISCELLANEOUS ONCE
Qty: 1 DEVICE | Refills: 0 | Status: SHIPPED | OUTPATIENT
Start: 2019-02-18 | End: 2022-10-31

## 2019-02-18 RX ORDER — LANCETS 30 GAUGE
EACH MISCELLANEOUS
Qty: 200 EACH | Refills: 3 | Status: SHIPPED | OUTPATIENT
Start: 2019-02-18 | End: 2019-08-15 | Stop reason: SDUPTHER

## 2019-02-18 RX ORDER — ATORVASTATIN CALCIUM 80 MG/1
80 TABLET, FILM COATED ORAL NIGHTLY
Qty: 90 TABLET | Refills: 3 | Status: SHIPPED | OUTPATIENT
Start: 2019-02-18 | End: 2019-12-21 | Stop reason: SDUPTHER

## 2019-02-18 ASSESSMENT — ENCOUNTER SYMPTOMS
VOMITING: 0
SHORTNESS OF BREATH: 0
SORE THROAT: 0
EYE DISCHARGE: 0
CHEST TIGHTNESS: 0
EYE PAIN: 0
NAUSEA: 0
COUGH: 0

## 2019-02-18 ASSESSMENT — PATIENT HEALTH QUESTIONNAIRE - PHQ9
2. FEELING DOWN, DEPRESSED OR HOPELESS: 0
1. LITTLE INTEREST OR PLEASURE IN DOING THINGS: 0
SUM OF ALL RESPONSES TO PHQ QUESTIONS 1-9: 0
SUM OF ALL RESPONSES TO PHQ9 QUESTIONS 1 & 2: 0
SUM OF ALL RESPONSES TO PHQ QUESTIONS 1-9: 0

## 2019-03-04 ENCOUNTER — PATIENT MESSAGE (OUTPATIENT)
Dept: PRIMARY CARE CLINIC | Age: 84
End: 2019-03-04

## 2019-03-04 DIAGNOSIS — F41.9 ANXIETY: ICD-10-CM

## 2019-03-04 RX ORDER — LORAZEPAM 0.5 MG/1
TABLET ORAL
Qty: 60 TABLET | Refills: 0 | Status: SHIPPED | OUTPATIENT
Start: 2019-03-04 | End: 2019-05-01 | Stop reason: SDUPTHER

## 2019-03-27 RX ORDER — AMLODIPINE BESYLATE 5 MG/1
5 TABLET ORAL DAILY
Qty: 90 TABLET | Refills: 2 | Status: SHIPPED | OUTPATIENT
Start: 2019-03-27 | End: 2019-11-25 | Stop reason: SDUPTHER

## 2019-04-30 DIAGNOSIS — F41.9 ANXIETY: ICD-10-CM

## 2019-05-01 RX ORDER — LORAZEPAM 0.5 MG/1
TABLET ORAL
Qty: 60 TABLET | Refills: 0 | Status: SHIPPED | OUTPATIENT
Start: 2019-05-01 | End: 2019-07-08 | Stop reason: SDUPTHER

## 2019-05-20 ENCOUNTER — OFFICE VISIT (OUTPATIENT)
Dept: PRIMARY CARE CLINIC | Age: 84
End: 2019-05-20
Payer: MEDICARE

## 2019-05-20 VITALS
OXYGEN SATURATION: 98 % | BODY MASS INDEX: 27.12 KG/M2 | SYSTOLIC BLOOD PRESSURE: 144 MMHG | HEART RATE: 57 BPM | WEIGHT: 189 LBS | DIASTOLIC BLOOD PRESSURE: 72 MMHG

## 2019-05-20 DIAGNOSIS — I25.10 CORONARY ARTERY DISEASE INVOLVING NATIVE CORONARY ARTERY OF NATIVE HEART WITHOUT ANGINA PECTORIS: ICD-10-CM

## 2019-05-20 DIAGNOSIS — J32.9 CHRONIC SINUSITIS, UNSPECIFIED LOCATION: ICD-10-CM

## 2019-05-20 DIAGNOSIS — Z79.4 TYPE 2 DIABETES MELLITUS WITHOUT COMPLICATION, WITH LONG-TERM CURRENT USE OF INSULIN (HCC): Primary | ICD-10-CM

## 2019-05-20 DIAGNOSIS — J31.0 CHRONIC RHINITIS: ICD-10-CM

## 2019-05-20 DIAGNOSIS — I10 ESSENTIAL HYPERTENSION: ICD-10-CM

## 2019-05-20 DIAGNOSIS — E11.9 TYPE 2 DIABETES MELLITUS WITHOUT COMPLICATION, WITH LONG-TERM CURRENT USE OF INSULIN (HCC): Primary | ICD-10-CM

## 2019-05-20 LAB — HBA1C MFR BLD: 7.4 %

## 2019-05-20 PROCEDURE — 1036F TOBACCO NON-USER: CPT | Performed by: FAMILY MEDICINE

## 2019-05-20 PROCEDURE — 99214 OFFICE O/P EST MOD 30 MIN: CPT | Performed by: FAMILY MEDICINE

## 2019-05-20 PROCEDURE — 4040F PNEUMOC VAC/ADMIN/RCVD: CPT | Performed by: FAMILY MEDICINE

## 2019-05-20 PROCEDURE — G8417 CALC BMI ABV UP PARAM F/U: HCPCS | Performed by: FAMILY MEDICINE

## 2019-05-20 PROCEDURE — G8598 ASA/ANTIPLAT THER USED: HCPCS | Performed by: FAMILY MEDICINE

## 2019-05-20 PROCEDURE — 1123F ACP DISCUSS/DSCN MKR DOCD: CPT | Performed by: FAMILY MEDICINE

## 2019-05-20 PROCEDURE — 83036 HEMOGLOBIN GLYCOSYLATED A1C: CPT | Performed by: FAMILY MEDICINE

## 2019-05-20 PROCEDURE — G8427 DOCREV CUR MEDS BY ELIG CLIN: HCPCS | Performed by: FAMILY MEDICINE

## 2019-05-20 RX ORDER — FLUTICASONE PROPIONATE 50 MCG
1 SPRAY, SUSPENSION (ML) NASAL DAILY
Qty: 1 BOTTLE | Refills: 3 | Status: SHIPPED | OUTPATIENT
Start: 2019-05-20 | End: 2022-04-18

## 2019-05-20 RX ORDER — LISINOPRIL AND HYDROCHLOROTHIAZIDE 20; 12.5 MG/1; MG/1
1 TABLET ORAL DAILY
Qty: 90 TABLET | Refills: 3 | Status: SHIPPED | OUTPATIENT
Start: 2019-05-20 | End: 2019-12-21 | Stop reason: SDUPTHER

## 2019-05-20 RX ORDER — AMOXICILLIN 500 MG/1
500 CAPSULE ORAL 3 TIMES DAILY
Qty: 30 CAPSULE | Refills: 0 | Status: SHIPPED | OUTPATIENT
Start: 2019-05-20 | End: 2019-05-30

## 2019-05-20 ASSESSMENT — ENCOUNTER SYMPTOMS
COUGH: 1
SORE THROAT: 0
SHORTNESS OF BREATH: 0
VOMITING: 0
ABDOMINAL PAIN: 0
RHINORRHEA: 0
WHEEZING: 0
NAUSEA: 0
EYE REDNESS: 0
SINUS PRESSURE: 1
EYE DISCHARGE: 0
DIARRHEA: 0

## 2019-05-20 NOTE — PROGRESS NOTES
[address] [place] [address] PRIMARY CARE  [id number] [facility]  Dept: [phone]    Angelika Reina is a 80 y.o. male who presents today for his medical conditions/complaintsas noted below. Angelika Reina is c/o of   Chief Complaint   Patient presents with    Diabetes     3 month follow up. Pt states his sugars have been around 100 in the morning.  Nasal Congestion     on and off x2 months    Cough     on and off dry cough. HPI:     HPI  Runny nose constant and then drainage and cough. Took mucinex which helped some but is still there. Has had occas lows if doesn't eat right.      Hemoglobin A1C (%)   Date Value   05/20/2019 7.4   02/18/2019 7.4   11/08/2018 6.6             ( goal A1C is < 7)   No results found for: LABMICR  LDL Cholesterol (mg/dL)   Date Value   01/26/2018 39   10/05/2015 51     LDL Calculated (mg/dL)   Date Value   03/06/2018 54   10/18/2016 70       (goal LDL is <100)   AST (U/L)   Date Value   10/04/2015 21     ALT (U/L)   Date Value   10/04/2015 12     BUN (mg/dL)   Date Value   01/27/2018 20     BP Readings from Last 3 Encounters:   05/20/19 (!) 144/72   02/18/19 134/64   11/08/18 134/70          (goal 140/90)    Past Medical History:   Diagnosis Date    Cerebral artery occlusion with cerebral infarction ([facility] [address]     Diabetes mellitus ([facility] [address]     H/O cataract     Hyperlipidemia     Hypertension     MI (myocardial infarction) ([facility] [place] [address] 5    Peyronie's disease     TIA (transient ischemic attack) 1998        Social History     Tobacco Use    Smoking status: Former Smoker    Smokeless tobacco: Never Used   Substance Use Topics    Alcohol use: No      Current Outpatient Medications   Medication Sig Dispense Refill    amoxicillin (AMOXIL) 500 MG capsule Take 1 capsule by mouth 3 times daily for 10 days 30 capsule 0    LORazepam (ATIVAN) 0.5 MG tablet take 1 tablet by mouth every 6 hours if needed for anxiety 60 tablet 0    amLODIPine (NORVASC) 5 MG tablet Take 1 tablet by mouth daily 90 tablet 2    metFORMIN (GLUCOPHAGE) 1000 MG tablet Take 1 tablet by mouth 2 times daily (with meals) 180 tablet 3    atorvastatin (LIPITOR) 80 MG tablet Take 1 tablet by mouth nightly 90 tablet 3    blood glucose test strips (FREESTYLE TEST STRIPS) strip Test bid and as needed 200 strip 3    Lancets MISC Use to test bid and prn 200 each 3    Lancet Device MISC 1 Device by Does not apply route once for 1 dose 1 Device 0    insulin glargine (LANTUS SOLOSTAR) 100 UNIT/ML injection pen Inject 15 Units into the skin nightly 15 mL 1    rivaroxaban (XARELTO) 20 MG TABS tablet Take 1 tablet by mouth daily 90 tablet 3    Insulin Pen Needle (ULTICARE SHORT PEN NEEDLES) 31G X 8 MM MISC Inject 1 each into the skin daily 100 each 3    clopidogrel (PLAVIX) 75 MG tablet Take 1 tablet by mouth daily 90 tablet 3    metoprolol succinate (TOPROL XL) 25 MG extended release tablet Take 1 tablet by mouth daily 90 tablet 1    lisinopril-hydrochlorothiazide (PRINZIDE;ZESTORETIC) 20-12.5 MG per tablet Take 1 tablet by mouth daily 90 tablet 3    nitroGLYCERIN (NITROSTAT) 0.4 MG SL tablet Place 1 tablet under the tongue every 5 minutes as needed for Chest pain 25 tablet 1    Coenzyme Q10 (CO Q-10) 30 MG CAPS Take 30 mg by mouth daily      Ascorbic Acid (VITAMIN C) 250 MG tablet Take 250 mg by mouth daily      ferrous sulfate 325 (65 FE) MG tablet Take 325 mg by mouth daily        No current facility-administered medications for this visit.       Allergies   Allergen Reactions    Sulfa Antibiotics Anaphylaxis       Health Maintenance   Topic Date Due    DTaP/Tdap/Td vaccine (1 - Tdap) 12/15/1954    Shingles Vaccine (1 of 2) 12/15/1985    Potassium monitoring  01/27/2019    Creatinine monitoring  01/27/2019    Flu vaccine  Completed    Pneumococcal 65+ years Vaccine  Completed       Subjective:     Review of Systems   Constitutional: Negative for chills and fever.   HENT: Positive for congestion and sinus pressure. Negative for rhinorrhea and sore throat. Eyes: Negative for discharge and redness. Respiratory: Positive for cough. Negative for shortness of breath and wheezing. Cardiovascular: Negative for chest pain and palpitations. Gastrointestinal: Negative for abdominal pain, diarrhea, nausea and vomiting. Genitourinary: Negative for dysuria and frequency. Musculoskeletal: Negative for arthralgias and myalgias. Neurological: Positive for light-headedness (occas) and headaches. Psychiatric/Behavioral: Negative for sleep disturbance. Objective:     BP (!) 144/72   Pulse 57   Wt 189 lb (85.7 kg)   SpO2 98%   BMI 27.12 kg/m²   Physical Exam   Constitutional: He is oriented to person, place, and time. He appears well-developed and well-nourished. No distress. HENT:   Head: Normocephalic and atraumatic. Right Ear: External ear normal.   Left Ear: External ear normal.   Mouth/Throat: Oropharynx is clear and moist.   Eyes: Pupils are equal, round, and reactive to light. Conjunctivae are normal. Right eye exhibits no discharge. Left eye exhibits no discharge. No scleral icterus. Neck: No tracheal deviation present. No thyromegaly present. Cardiovascular: Normal rate, regular rhythm and normal heart sounds. No carotid bruits   Pulmonary/Chest: Effort normal and breath sounds normal. No respiratory distress. He has no wheezes. Musculoskeletal: He exhibits no edema. Lymphadenopathy:     He has no cervical adenopathy. Neurological: He is alert and oriented to person, place, and time. Skin: Skin is warm. No rash noted. Psychiatric: He has a normal mood and affect. His behavior is normal. Thought content normal.   Nursing note and vitals reviewed. Assessment:       Diagnosis Orders   1.  Type 2 diabetes mellitus without complication, with long-term current use of insulin (HCC)  TX COLLECTION CAPILLARY BLOOD SPECIMEN    POCT

## 2019-06-06 ENCOUNTER — OFFICE VISIT (OUTPATIENT)
Dept: PRIMARY CARE CLINIC | Age: 84
End: 2019-06-06
Payer: MEDICARE

## 2019-06-06 VITALS
SYSTOLIC BLOOD PRESSURE: 134 MMHG | HEART RATE: 59 BPM | BODY MASS INDEX: 26.89 KG/M2 | OXYGEN SATURATION: 97 % | DIASTOLIC BLOOD PRESSURE: 80 MMHG | WEIGHT: 187.4 LBS

## 2019-06-06 DIAGNOSIS — L57.0 AK (ACTINIC KERATOSIS): ICD-10-CM

## 2019-06-06 DIAGNOSIS — D48.5 NEOPLASM OF UNCERTAIN BEHAVIOR OF SKIN: Primary | ICD-10-CM

## 2019-06-06 PROCEDURE — 99213 OFFICE O/P EST LOW 20 MIN: CPT | Performed by: PHYSICIAN ASSISTANT

## 2019-06-06 PROCEDURE — 1036F TOBACCO NON-USER: CPT | Performed by: PHYSICIAN ASSISTANT

## 2019-06-06 PROCEDURE — G8598 ASA/ANTIPLAT THER USED: HCPCS | Performed by: PHYSICIAN ASSISTANT

## 2019-06-06 PROCEDURE — 4040F PNEUMOC VAC/ADMIN/RCVD: CPT | Performed by: PHYSICIAN ASSISTANT

## 2019-06-06 PROCEDURE — 1123F ACP DISCUSS/DSCN MKR DOCD: CPT | Performed by: PHYSICIAN ASSISTANT

## 2019-06-06 PROCEDURE — G8427 DOCREV CUR MEDS BY ELIG CLIN: HCPCS | Performed by: PHYSICIAN ASSISTANT

## 2019-06-06 PROCEDURE — G8417 CALC BMI ABV UP PARAM F/U: HCPCS | Performed by: PHYSICIAN ASSISTANT

## 2019-06-06 ASSESSMENT — ENCOUNTER SYMPTOMS
EYES NEGATIVE: 1
COLOR CHANGE: 0
RESPIRATORY NEGATIVE: 1
ABDOMINAL PAIN: 0

## 2019-06-06 NOTE — PROGRESS NOTES
Skin Spot Check    6/6/2019  Africa Price  1935  Chief Complaint   Patient presents with    Lesion(s)     right side of forehead x 1 month. was drying off after shower, rubbing with towel, lesion started bleeding - used a styptic pencil to stop the bleeding, now has 'puffed up'. Had spots removed, unsure if cancer or not. Spot on left temple as well. Location:  Forehead and left temple  Duration:  1 month for forehead. Left temple \"quite a awhile\"  Itch:  No  Bleed:  Yes, several of them  Growing:  Yes  History of skin cancer: Yes   BCC on arm    Review of Systems   Constitutional: Negative for chills, diaphoresis, fever and unexpected weight change. HENT: Negative. Negative for mouth sores. Eyes: Negative. Respiratory: Negative. Cardiovascular: Negative. Gastrointestinal: Negative for abdominal pain. Musculoskeletal: Negative for arthralgias and myalgias. Skin: Negative for color change, pallor, rash and wound. Allergic/Immunologic: Negative for environmental allergies, food allergies and immunocompromised state. Hematological: Negative for adenopathy. Physical Exam  Face:  Right upper forehead has a dark scabbed over papule, thick red papule in center and left temple has a large rough, scaly lesion just in front of ear. No wounds, no dryness or color changes. There are several other rough papules on face as well  Ears:  Rough papules on left ear, no wounds, nodryness or color changes  Scalp:  balding  Arms:  Few rough red papules on arms, no wounds, no dryness or color changes      ICD-10-CM    1. Neoplasm of uncertain behavior of skin D48.5    2.  AK (actinic keratosis) L57.0        Friday for 3 shave biopsies and cryotherapy  Reviewed proper skin care and sun protection  Brochure given on skin cancer        Aristeo Arredondo, [facility]

## 2019-06-21 ENCOUNTER — PROCEDURE VISIT (OUTPATIENT)
Dept: PRIMARY CARE CLINIC | Age: 84
End: 2019-06-21
Payer: MEDICARE

## 2019-06-21 ENCOUNTER — HOSPITAL ENCOUNTER (OUTPATIENT)
Age: 84
Setting detail: SPECIMEN
Discharge: HOME | End: 2019-06-21
Payer: MEDICARE

## 2019-06-21 VITALS
WEIGHT: 189.2 LBS | OXYGEN SATURATION: 98 % | DIASTOLIC BLOOD PRESSURE: 82 MMHG | BODY MASS INDEX: 27.15 KG/M2 | SYSTOLIC BLOOD PRESSURE: 154 MMHG | HEART RATE: 73 BPM

## 2019-06-21 DIAGNOSIS — D48.5 NEOPLASM OF UNCERTAIN BEHAVIOR OF SKIN: Primary | ICD-10-CM

## 2019-06-21 PROCEDURE — 11102 TANGNTL BX SKIN SINGLE LES: CPT | Performed by: PHYSICIAN ASSISTANT

## 2019-06-21 PROCEDURE — 11103 TANGNTL BX SKIN EA SEP/ADDL: CPT | Performed by: PHYSICIAN ASSISTANT

## 2019-06-21 NOTE — PROGRESS NOTES
Skin Biopsy Procedure Note  6/21/2019  Prasanna De La Garza  1935    BP (!) 154/82   Pulse 73   Wt 189 lb 3.2 oz (85.8 kg)   SpO2 98%   BMI 27.15 kg/m²   VITALS REVIEWED    Allergies   Allergen Reactions    Sulfa Antibiotics Anaphylaxis       Chief Complaint   Patient presents with    Procedure       Lesion:  · 1. Right upper forehead--6 weeks and has flap that was bleeding before procedure. · 2. Central forehead  · 3. Left temple       Indication for Biopsy 1: Non healing lesion  Indication for Biopsy 2: Non healing lesion  Indication for Biopsy 3:  Non heal ing lesion    Procedure: The lesion(s) were cleansed with Alcohol.  2% lidocaine without epinephrine was used for local anaesthesia. 1.A 9 mm  shave was used to obtain a specimen. 2. A 6mm shave was used   3. A 9mm shave was used. The specimen(s) were    preserved and sent for pathological examination. The biopsy site(s) were  cleansed and hemostasis using ACl and a pressure dressing(s) were achieved with good results. The patient was instructed on wound care. No complications occurred and the patient tolerated the procedure well. Diagnosis Orders   1. Neoplasm of uncertain behavior of skin  Derm biopsy    Derm biopsy    Derm biopsy       Follow Up: Wound care discussed. Keep well moisturized. Watch for signs of infection which would include:  Redness, swelling, fever. If signs appear, please return to office. Return for Will call with results.        Electronically signed by Libra Tomas PA-C on 6/21/2019 at 9:44 AM

## 2019-06-21 NOTE — PROGRESS NOTES
[facility] Keratoses destruction procedure note:  6/21/2019  Yasmin Valencia  1935    BP (!) 154/82   Pulse 73   Wt 189 lb 3.2 oz (85.8 kg)   SpO2 98%   BMI 27.15 kg/m²   ALL VITALS REVIEWED    Allergies   Allergen Reactions    Sulfa Antibiotics Anaphylaxis       Chief Complaint   Patient presents with    Procedure     Written consent was obtained. Lesion(s) cleansed with Alcohol. Location:  Face, left ear, arms    Histofreeze applied with applicator. Numberlesions treated: {Numbers; 1-2:11009:o}    Physical Exam    Patient tolerated procedure well. {No diagnosis found. (Refresh or delete this SmartLink)}    Follow Up: Wound care discussed. Keep well moisturized. Watch for signs of infection which would include:  Redness, swelling, fever. If signs appear, please return to office. No follow-ups on file.     Electronically signed by Crissy Tobar PA-C on 6/21/2019 at 9:12 AM

## 2019-06-25 LAB — DERMATOLOGY PATHOLOGY REPORT: NORMAL

## 2019-06-26 DIAGNOSIS — C44.92 SCC (SQUAMOUS CELL CARCINOMA): Primary | ICD-10-CM

## 2019-07-08 DIAGNOSIS — F41.9 ANXIETY: ICD-10-CM

## 2019-07-08 RX ORDER — LORAZEPAM 0.5 MG/1
TABLET ORAL
Qty: 60 TABLET | Refills: 0 | Status: SHIPPED | OUTPATIENT
Start: 2019-07-08 | End: 2019-09-12 | Stop reason: SDUPTHER

## 2019-08-07 RX ORDER — METOPROLOL SUCCINATE 25 MG/1
25 TABLET, EXTENDED RELEASE ORAL DAILY
Qty: 90 TABLET | Refills: 1 | Status: SHIPPED | OUTPATIENT
Start: 2019-08-07 | End: 2019-12-21 | Stop reason: SDUPTHER

## 2019-08-10 DIAGNOSIS — Z79.4 TYPE 2 DIABETES MELLITUS WITHOUT COMPLICATION, WITH LONG-TERM CURRENT USE OF INSULIN (HCC): ICD-10-CM

## 2019-08-10 DIAGNOSIS — E11.9 TYPE 2 DIABETES MELLITUS WITHOUT COMPLICATION, WITH LONG-TERM CURRENT USE OF INSULIN (HCC): ICD-10-CM

## 2019-08-12 RX ORDER — INSULIN GLARGINE 100 [IU]/ML
INJECTION, SOLUTION SUBCUTANEOUS
Qty: 15 ML | Refills: 1 | Status: SHIPPED | OUTPATIENT
Start: 2019-08-12 | End: 2020-03-05 | Stop reason: SDUPTHER

## 2019-08-15 DIAGNOSIS — E11.9 TYPE 2 DIABETES MELLITUS WITHOUT COMPLICATION, WITH LONG-TERM CURRENT USE OF INSULIN (HCC): ICD-10-CM

## 2019-08-15 DIAGNOSIS — Z79.4 TYPE 2 DIABETES MELLITUS WITHOUT COMPLICATION, WITH LONG-TERM CURRENT USE OF INSULIN (HCC): ICD-10-CM

## 2019-08-16 RX ORDER — LANCETS
EACH MISCELLANEOUS
Qty: 204 EACH | Refills: 3 | Status: SHIPPED | OUTPATIENT
Start: 2019-08-16 | End: 2020-09-11

## 2019-08-19 ENCOUNTER — OFFICE VISIT (OUTPATIENT)
Dept: PRIMARY CARE CLINIC | Age: 84
End: 2019-08-19
Payer: MEDICARE

## 2019-08-19 VITALS
DIASTOLIC BLOOD PRESSURE: 68 MMHG | HEART RATE: 58 BPM | SYSTOLIC BLOOD PRESSURE: 134 MMHG | OXYGEN SATURATION: 98 % | BODY MASS INDEX: 27.09 KG/M2 | WEIGHT: 188.8 LBS

## 2019-08-19 DIAGNOSIS — I25.10 CORONARY ARTERY DISEASE INVOLVING NATIVE CORONARY ARTERY OF NATIVE HEART WITHOUT ANGINA PECTORIS: ICD-10-CM

## 2019-08-19 DIAGNOSIS — Z79.4 TYPE 2 DIABETES MELLITUS WITHOUT COMPLICATION, WITH LONG-TERM CURRENT USE OF INSULIN (HCC): Primary | ICD-10-CM

## 2019-08-19 DIAGNOSIS — I10 ESSENTIAL HYPERTENSION: ICD-10-CM

## 2019-08-19 DIAGNOSIS — E11.9 TYPE 2 DIABETES MELLITUS WITHOUT COMPLICATION, WITH LONG-TERM CURRENT USE OF INSULIN (HCC): Primary | ICD-10-CM

## 2019-08-19 PROCEDURE — G8598 ASA/ANTIPLAT THER USED: HCPCS | Performed by: FAMILY MEDICINE

## 2019-08-19 PROCEDURE — 1123F ACP DISCUSS/DSCN MKR DOCD: CPT | Performed by: FAMILY MEDICINE

## 2019-08-19 PROCEDURE — 1036F TOBACCO NON-USER: CPT | Performed by: FAMILY MEDICINE

## 2019-08-19 PROCEDURE — G8417 CALC BMI ABV UP PARAM F/U: HCPCS | Performed by: FAMILY MEDICINE

## 2019-08-19 PROCEDURE — G8427 DOCREV CUR MEDS BY ELIG CLIN: HCPCS | Performed by: FAMILY MEDICINE

## 2019-08-19 PROCEDURE — 4040F PNEUMOC VAC/ADMIN/RCVD: CPT | Performed by: FAMILY MEDICINE

## 2019-08-19 PROCEDURE — 99214 OFFICE O/P EST MOD 30 MIN: CPT | Performed by: FAMILY MEDICINE

## 2019-08-19 ASSESSMENT — ENCOUNTER SYMPTOMS
RHINORRHEA: 0
SHORTNESS OF BREATH: 0
WHEEZING: 0
EYE DISCHARGE: 0
VOMITING: 0
SORE THROAT: 0
NAUSEA: 0
DIARRHEA: 0
ABDOMINAL PAIN: 0
EYE REDNESS: 0
COUGH: 0

## 2019-08-19 NOTE — PROGRESS NOTES
tablet Take 1 tablet by mouth daily 90 tablet 1    lisinopril-hydrochlorothiazide (PRINZIDE;ZESTORETIC) 20-12.5 MG per tablet Take 1 tablet by mouth daily 90 tablet 3    amLODIPine (NORVASC) 5 MG tablet Take 1 tablet by mouth daily 90 tablet 2    metFORMIN (GLUCOPHAGE) 1000 MG tablet Take 1 tablet by mouth 2 times daily (with meals) 180 tablet 3    atorvastatin (LIPITOR) 80 MG tablet Take 1 tablet by mouth nightly 90 tablet 3    blood glucose test strips (FREESTYLE TEST STRIPS) strip Test bid and as needed 200 strip 3    Lancet Device MISC 1 Device by Does not apply route once for 1 dose 1 Device 0    rivaroxaban (XARELTO) 20 MG TABS tablet Take 1 tablet by mouth daily 90 tablet 3    Insulin Pen Needle (ULTICARE SHORT PEN NEEDLES) 31G X 8 MM MISC Inject 1 each into the skin daily 100 each 3    clopidogrel (PLAVIX) 75 MG tablet Take 1 tablet by mouth daily 90 tablet 3    nitroGLYCERIN (NITROSTAT) 0.4 MG SL tablet Place 1 tablet under the tongue every 5 minutes as needed for Chest pain 25 tablet 1    Coenzyme Q10 (CO Q-10) 30 MG CAPS Take 30 mg by mouth daily      Ascorbic Acid (VITAMIN C) 250 MG tablet Take 250 mg by mouth daily      ferrous sulfate 325 (65 FE) MG tablet Take 325 mg by mouth daily       fluticasone (FLONASE) 50 MCG/ACT nasal spray 1 spray by Each Nare route daily (Patient not taking: Reported on 8/19/2019) 1 Bottle 3     No current facility-administered medications for this visit. Allergies   Allergen Reactions    Sulfa Antibiotics Anaphylaxis       Health Maintenance   Topic Date Due    DTaP/Tdap/Td vaccine (1 - Tdap) 12/15/1954    Shingles Vaccine (1 of 2) 12/15/1985    Annual Wellness Visit (AWV)  12/15/1998    Potassium monitoring  01/27/2019    Creatinine monitoring  01/27/2019    Flu vaccine (1) 09/01/2019    Pneumococcal 65+ years Vaccine  Completed       Subjective:     Review of Systems   Constitutional: Negative for chills and fever.    HENT: Positive for

## 2019-08-23 LAB
ALBUMIN SERPL-MCNC: NORMAL G/DL
ALP BLD-CCNC: NORMAL U/L
ALT SERPL-CCNC: NORMAL U/L
ANION GAP SERPL CALCULATED.3IONS-SCNC: NORMAL MMOL/L
AST SERPL-CCNC: NORMAL U/L
AVERAGE GLUCOSE: 169
BASOPHILS ABSOLUTE: NORMAL /ΜL
BASOPHILS RELATIVE PERCENT: NORMAL %
BILIRUB SERPL-MCNC: NORMAL MG/DL (ref 0.1–1.4)
BUN BLDV-MCNC: NORMAL MG/DL
CALCIUM SERPL-MCNC: NORMAL MG/DL
CHLORIDE BLD-SCNC: NORMAL MMOL/L
CO2: NORMAL MMOL/L
CREAT SERPL-MCNC: NORMAL MG/DL
EOSINOPHILS ABSOLUTE: NORMAL /ΜL
EOSINOPHILS RELATIVE PERCENT: NORMAL %
GFR CALCULATED: NORMAL
GLUCOSE BLD-MCNC: 177 MG/DL
HBA1C MFR BLD: 7.5 %
HCT VFR BLD CALC: NORMAL % (ref 41–53)
HEMOGLOBIN: NORMAL G/DL (ref 13.5–17.5)
LYMPHOCYTES ABSOLUTE: NORMAL /ΜL
LYMPHOCYTES RELATIVE PERCENT: NORMAL %
MCH RBC QN AUTO: NORMAL PG
MCHC RBC AUTO-ENTMCNC: NORMAL G/DL
MCV RBC AUTO: NORMAL FL
MONOCYTES ABSOLUTE: NORMAL /ΜL
MONOCYTES RELATIVE PERCENT: NORMAL %
NEUTROPHILS ABSOLUTE: NORMAL /ΜL
NEUTROPHILS RELATIVE PERCENT: NORMAL %
PDW BLD-RTO: NORMAL %
PLATELET # BLD: NORMAL K/ΜL
PMV BLD AUTO: NORMAL FL
POTASSIUM SERPL-SCNC: NORMAL MMOL/L
RBC # BLD: NORMAL 10^6/ΜL
SODIUM BLD-SCNC: NORMAL MMOL/L
TOTAL PROTEIN: NORMAL
WBC # BLD: NORMAL 10^3/ML

## 2019-08-27 DIAGNOSIS — Z79.4 TYPE 2 DIABETES MELLITUS WITHOUT COMPLICATION, WITH LONG-TERM CURRENT USE OF INSULIN (HCC): ICD-10-CM

## 2019-08-27 DIAGNOSIS — I10 ESSENTIAL HYPERTENSION: ICD-10-CM

## 2019-08-27 DIAGNOSIS — E11.9 TYPE 2 DIABETES MELLITUS WITHOUT COMPLICATION, WITH LONG-TERM CURRENT USE OF INSULIN (HCC): ICD-10-CM

## 2019-08-27 DIAGNOSIS — I25.10 CORONARY ARTERY DISEASE INVOLVING NATIVE CORONARY ARTERY OF NATIVE HEART WITHOUT ANGINA PECTORIS: ICD-10-CM

## 2019-09-12 DIAGNOSIS — F41.9 ANXIETY: ICD-10-CM

## 2019-09-13 RX ORDER — LORAZEPAM 0.5 MG/1
TABLET ORAL
Qty: 60 TABLET | Refills: 0 | Status: SHIPPED | OUTPATIENT
Start: 2019-09-13 | End: 2019-11-04 | Stop reason: SDUPTHER

## 2019-11-04 DIAGNOSIS — F41.9 ANXIETY: ICD-10-CM

## 2019-11-04 RX ORDER — LORAZEPAM 0.5 MG/1
TABLET ORAL
Qty: 60 TABLET | Refills: 0 | Status: SHIPPED | OUTPATIENT
Start: 2019-11-04 | End: 2020-01-15

## 2019-11-13 ENCOUNTER — TELEPHONE (OUTPATIENT)
Dept: PRIMARY CARE CLINIC | Age: 84
End: 2019-11-13

## 2019-12-02 ENCOUNTER — OFFICE VISIT (OUTPATIENT)
Dept: PRIMARY CARE CLINIC | Age: 84
End: 2019-12-02
Payer: MEDICARE

## 2019-12-02 VITALS
SYSTOLIC BLOOD PRESSURE: 134 MMHG | DIASTOLIC BLOOD PRESSURE: 62 MMHG | WEIGHT: 186.2 LBS | OXYGEN SATURATION: 99 % | BODY MASS INDEX: 26.72 KG/M2 | HEART RATE: 63 BPM

## 2019-12-02 DIAGNOSIS — E11.9 TYPE 2 DIABETES MELLITUS WITHOUT COMPLICATION, WITH LONG-TERM CURRENT USE OF INSULIN (HCC): Primary | ICD-10-CM

## 2019-12-02 DIAGNOSIS — Z79.4 TYPE 2 DIABETES MELLITUS WITHOUT COMPLICATION, WITH LONG-TERM CURRENT USE OF INSULIN (HCC): Primary | ICD-10-CM

## 2019-12-02 DIAGNOSIS — E11.9 TYPE 2 DIABETES MELLITUS WITHOUT COMPLICATION, WITH LONG-TERM CURRENT USE OF INSULIN (HCC): ICD-10-CM

## 2019-12-02 DIAGNOSIS — Z79.4 TYPE 2 DIABETES MELLITUS WITHOUT COMPLICATION, WITH LONG-TERM CURRENT USE OF INSULIN (HCC): ICD-10-CM

## 2019-12-02 DIAGNOSIS — I10 ESSENTIAL HYPERTENSION: Primary | ICD-10-CM

## 2019-12-02 PROBLEM — D64.9 ANEMIA: Status: ACTIVE | Noted: 2019-12-02

## 2019-12-02 PROBLEM — K56.600 PARTIAL SMALL BOWEL OBSTRUCTION (HCC): Status: ACTIVE | Noted: 2019-11-07

## 2019-12-02 PROBLEM — M19.90 ARTHRITIS: Status: ACTIVE | Noted: 2019-12-02

## 2019-12-02 LAB — HBA1C MFR BLD: 7.2 %

## 2019-12-02 PROCEDURE — 1036F TOBACCO NON-USER: CPT | Performed by: FAMILY MEDICINE

## 2019-12-02 PROCEDURE — G8427 DOCREV CUR MEDS BY ELIG CLIN: HCPCS | Performed by: FAMILY MEDICINE

## 2019-12-02 PROCEDURE — 99214 OFFICE O/P EST MOD 30 MIN: CPT | Performed by: FAMILY MEDICINE

## 2019-12-02 PROCEDURE — G8417 CALC BMI ABV UP PARAM F/U: HCPCS | Performed by: FAMILY MEDICINE

## 2019-12-02 PROCEDURE — G8598 ASA/ANTIPLAT THER USED: HCPCS | Performed by: FAMILY MEDICINE

## 2019-12-02 PROCEDURE — G8484 FLU IMMUNIZE NO ADMIN: HCPCS | Performed by: FAMILY MEDICINE

## 2019-12-02 PROCEDURE — 1123F ACP DISCUSS/DSCN MKR DOCD: CPT | Performed by: FAMILY MEDICINE

## 2019-12-02 PROCEDURE — 4040F PNEUMOC VAC/ADMIN/RCVD: CPT | Performed by: FAMILY MEDICINE

## 2019-12-02 ASSESSMENT — ENCOUNTER SYMPTOMS
NAUSEA: 0
DIARRHEA: 0
CONSTIPATION: 0
SHORTNESS OF BREATH: 0
VOMITING: 0
COUGH: 0

## 2020-01-03 ENCOUNTER — TELEPHONE (OUTPATIENT)
Dept: PRIMARY CARE CLINIC | Age: 85
End: 2020-01-03

## 2020-01-06 RX ORDER — METOPROLOL SUCCINATE 25 MG/1
25 TABLET, EXTENDED RELEASE ORAL DAILY
Qty: 90 TABLET | Refills: 3 | Status: SHIPPED | OUTPATIENT
Start: 2020-01-06 | End: 2020-01-06 | Stop reason: SDUPTHER

## 2020-01-06 RX ORDER — METOPROLOL SUCCINATE 25 MG/1
25 TABLET, EXTENDED RELEASE ORAL DAILY
Qty: 90 TABLET | Refills: 3 | Status: SHIPPED | OUTPATIENT
Start: 2020-01-06 | End: 2020-01-08 | Stop reason: SDUPTHER

## 2020-01-08 RX ORDER — METOPROLOL SUCCINATE 25 MG/1
25 TABLET, EXTENDED RELEASE ORAL DAILY
Qty: 90 TABLET | Refills: 3 | Status: SHIPPED | OUTPATIENT
Start: 2020-01-08 | End: 2020-11-02

## 2020-01-08 RX ORDER — METOPROLOL SUCCINATE 25 MG/1
25 TABLET, EXTENDED RELEASE ORAL DAILY
Qty: 90 TABLET | Refills: 3 | Status: SHIPPED | OUTPATIENT
Start: 2020-01-08 | End: 2020-01-08

## 2020-01-15 RX ORDER — LORAZEPAM 0.5 MG/1
TABLET ORAL
Qty: 60 TABLET | Refills: 0 | Status: SHIPPED | OUTPATIENT
Start: 2020-01-15 | End: 2020-03-09

## 2020-02-03 RX ORDER — AMLODIPINE BESYLATE 5 MG/1
5 TABLET ORAL DAILY
Qty: 90 TABLET | Refills: 3 | Status: SHIPPED | OUTPATIENT
Start: 2020-02-03 | End: 2020-12-07

## 2020-02-03 RX ORDER — LISINOPRIL AND HYDROCHLOROTHIAZIDE 20; 12.5 MG/1; MG/1
1 TABLET ORAL DAILY
Qty: 90 TABLET | Refills: 3 | Status: SHIPPED | OUTPATIENT
Start: 2020-02-03 | End: 2020-12-07

## 2020-03-09 RX ORDER — LORAZEPAM 0.5 MG/1
TABLET ORAL
Qty: 60 TABLET | Refills: 0 | Status: SHIPPED | OUTPATIENT
Start: 2020-03-09 | End: 2020-04-09

## 2020-03-12 ENCOUNTER — OFFICE VISIT (OUTPATIENT)
Dept: PRIMARY CARE CLINIC | Age: 85
End: 2020-03-12
Payer: MEDICARE

## 2020-03-12 ENCOUNTER — HOSPITAL ENCOUNTER (OUTPATIENT)
Age: 85
Setting detail: SPECIMEN
Discharge: HOME | End: 2020-03-12
Payer: MEDICARE

## 2020-03-12 VITALS
BODY MASS INDEX: 26.89 KG/M2 | WEIGHT: 187.4 LBS | HEART RATE: 59 BPM | SYSTOLIC BLOOD PRESSURE: 138 MMHG | OXYGEN SATURATION: 98 % | DIASTOLIC BLOOD PRESSURE: 72 MMHG

## 2020-03-12 PROBLEM — I48.0 PAROXYSMAL ATRIAL FIBRILLATION (HCC): Status: ACTIVE | Noted: 2020-03-12

## 2020-03-12 LAB
ABSOLUTE EOS #: 0.15 K/UL (ref 0–0.44)
ABSOLUTE IMMATURE GRANULOCYTE: <0.03 K/UL (ref 0–0.3)
ABSOLUTE LYMPH #: 2.11 K/UL (ref 1.1–3.7)
ABSOLUTE MONO #: 0.72 K/UL (ref 0.1–1.2)
ALBUMIN SERPL-MCNC: 4 G/DL (ref 3.5–5.2)
ALBUMIN/GLOBULIN RATIO: 1.2 (ref 1–2.5)
ALP BLD-CCNC: 67 U/L (ref 40–129)
ALT SERPL-CCNC: 17 U/L (ref 5–41)
ANION GAP SERPL CALCULATED.3IONS-SCNC: 12 MMOL/L (ref 9–17)
AST SERPL-CCNC: 23 U/L
BASOPHILS # BLD: 1 % (ref 0–2)
BASOPHILS ABSOLUTE: 0.04 K/UL (ref 0–0.2)
BILIRUB SERPL-MCNC: 0.37 MG/DL (ref 0.3–1.2)
BUN BLDV-MCNC: 26 MG/DL (ref 8–23)
BUN/CREAT BLD: ABNORMAL (ref 9–20)
CALCIUM SERPL-MCNC: 9.8 MG/DL (ref 8.6–10.4)
CHLORIDE BLD-SCNC: 103 MMOL/L (ref 98–107)
CO2: 23 MMOL/L (ref 20–31)
CREAT SERPL-MCNC: 1.17 MG/DL (ref 0.7–1.2)
DIFFERENTIAL TYPE: ABNORMAL
EOSINOPHILS RELATIVE PERCENT: 2 % (ref 1–4)
FERRITIN: 66 UG/L (ref 30–400)
FOLATE: >20 NG/ML
GFR AFRICAN AMERICAN: >60 ML/MIN
GFR NON-AFRICAN AMERICAN: 59 ML/MIN
GFR SERPL CREATININE-BSD FRML MDRD: ABNORMAL ML/MIN/{1.73_M2}
GFR SERPL CREATININE-BSD FRML MDRD: ABNORMAL ML/MIN/{1.73_M2}
GLUCOSE BLD-MCNC: 166 MG/DL (ref 70–99)
HBA1C MFR BLD: 7.7 %
HCT VFR BLD CALC: 36.3 % (ref 40.7–50.3)
HEMOGLOBIN: 11.5 G/DL (ref 13–17)
IMMATURE GRANULOCYTES: 0 %
IRON: 84 UG/DL (ref 59–158)
LYMPHOCYTES # BLD: 31 % (ref 24–43)
MCH RBC QN AUTO: 30.3 PG (ref 25.2–33.5)
MCHC RBC AUTO-ENTMCNC: 31.7 G/DL (ref 28.4–34.8)
MCV RBC AUTO: 95.5 FL (ref 82.6–102.9)
MONOCYTES # BLD: 11 % (ref 3–12)
NRBC AUTOMATED: 0 PER 100 WBC
PDW BLD-RTO: 12.3 % (ref 11.8–14.4)
PLATELET # BLD: 203 K/UL (ref 138–453)
PLATELET ESTIMATE: ABNORMAL
PMV BLD AUTO: 11.8 FL (ref 8.1–13.5)
POTASSIUM SERPL-SCNC: 5.1 MMOL/L (ref 3.7–5.3)
RBC # BLD: 3.8 M/UL (ref 4.21–5.77)
RBC # BLD: ABNORMAL 10*6/UL
SEG NEUTROPHILS: 55 % (ref 36–65)
SEGMENTED NEUTROPHILS ABSOLUTE COUNT: 3.67 K/UL (ref 1.5–8.1)
SODIUM BLD-SCNC: 138 MMOL/L (ref 135–144)
TOTAL PROTEIN: 7.3 G/DL (ref 6.4–8.3)
VITAMIN B-12: 240 PG/ML (ref 232–1245)
WBC # BLD: 6.7 K/UL (ref 3.5–11.3)
WBC # BLD: ABNORMAL 10*3/UL

## 2020-03-12 PROCEDURE — 3051F HG A1C>EQUAL 7.0%<8.0%: CPT | Performed by: FAMILY MEDICINE

## 2020-03-12 PROCEDURE — 4040F PNEUMOC VAC/ADMIN/RCVD: CPT | Performed by: FAMILY MEDICINE

## 2020-03-12 PROCEDURE — G8417 CALC BMI ABV UP PARAM F/U: HCPCS | Performed by: FAMILY MEDICINE

## 2020-03-12 PROCEDURE — G8427 DOCREV CUR MEDS BY ELIG CLIN: HCPCS | Performed by: FAMILY MEDICINE

## 2020-03-12 PROCEDURE — 99213 OFFICE O/P EST LOW 20 MIN: CPT | Performed by: FAMILY MEDICINE

## 2020-03-12 PROCEDURE — 83036 HEMOGLOBIN GLYCOSYLATED A1C: CPT | Performed by: FAMILY MEDICINE

## 2020-03-12 PROCEDURE — G8484 FLU IMMUNIZE NO ADMIN: HCPCS | Performed by: FAMILY MEDICINE

## 2020-03-12 PROCEDURE — 1036F TOBACCO NON-USER: CPT | Performed by: FAMILY MEDICINE

## 2020-03-12 PROCEDURE — 1123F ACP DISCUSS/DSCN MKR DOCD: CPT | Performed by: FAMILY MEDICINE

## 2020-03-12 ASSESSMENT — ENCOUNTER SYMPTOMS
SORE THROAT: 0
SHORTNESS OF BREATH: 0
EYE DISCHARGE: 0
RHINORRHEA: 0
VOMITING: 0
NAUSEA: 0
ABDOMINAL PAIN: 0
DIARRHEA: 0
WHEEZING: 0
COUGH: 0
EYE REDNESS: 0

## 2020-03-12 NOTE — PROGRESS NOTES
monitoring  08/23/2020    Creatinine monitoring  08/23/2020    Flu vaccine  Completed    Pneumococcal 65+ years Vaccine  Completed    Hepatitis A vaccine  Aged Out    Hib vaccine  Aged Out    Meningococcal (ACWY) vaccine  Aged Out       Subjective:     Review of Systems   Constitutional: Positive for fatigue. Negative for chills and fever. HENT: Negative for rhinorrhea and sore throat. Eyes: Negative for discharge and redness. Respiratory: Negative for cough, shortness of breath and wheezing. Cardiovascular: Positive for palpitations (a fib acting up occas- meds helped when he had). Negative for chest pain. Gastrointestinal: Negative for abdominal pain, diarrhea, nausea and vomiting. Genitourinary: Negative for dysuria and frequency. Musculoskeletal: Negative for arthralgias and myalgias. Neurological: Positive for light-headedness (occas- if gets up too fast). Negative for dizziness and headaches. Psychiatric/Behavioral: Positive for sleep disturbance. Objective:     /72   Pulse 59   Wt 187 lb 6.4 oz (85 kg)   SpO2 98%   BMI 26.89 kg/m²   Physical Exam  Vitals signs and nursing note reviewed. Constitutional:       General: He is not in acute distress. Appearance: He is well-developed. HENT:      Head: Normocephalic and atraumatic. Eyes:      General: No scleral icterus. Right eye: No discharge. Left eye: No discharge. Conjunctiva/sclera: Conjunctivae normal.      Pupils: Pupils are equal, round, and reactive to light. Neck:      Thyroid: No thyromegaly. Trachea: No tracheal deviation. Cardiovascular:      Rate and Rhythm: Normal rate and regular rhythm. Heart sounds: Normal heart sounds. Comments: No carotid bruits  Pulmonary:      Effort: Pulmonary effort is normal. No respiratory distress. Breath sounds: Normal breath sounds. No wheezing. Lymphadenopathy:      Cervical: No cervical adenopathy.    Skin:     General: health maintenance. Instructed to continue current medications, diet andexercise. Patient agreed with treatment plan. Follow up as directed.      Electronicallysigned by Grayson Olivo MD on 3/12/2020 at 10:21 AM

## 2020-03-20 RX ORDER — CLOPIDOGREL BISULFATE 75 MG/1
75 TABLET ORAL DAILY
Qty: 90 TABLET | Refills: 3 | Status: SHIPPED | OUTPATIENT
Start: 2020-03-20 | End: 2021-06-02

## 2020-04-29 ENCOUNTER — TELEPHONE (OUTPATIENT)
Dept: PRIMARY CARE CLINIC | Age: 85
End: 2020-04-29

## 2020-04-29 RX ORDER — BLOOD SUGAR DIAGNOSTIC
STRIP MISCELLANEOUS
Qty: 150 STRIP | Refills: 2 | Status: SHIPPED | OUTPATIENT
Start: 2020-04-29 | End: 2021-04-02

## 2020-04-29 NOTE — TELEPHONE ENCOUNTER
Last OV 3/12/20    Last Rx  2/18/19    blood glucose test strips (FREESTYLE TEST STRIPS) strip     [address]

## 2020-05-04 RX ORDER — ATORVASTATIN CALCIUM 80 MG/1
80 TABLET, FILM COATED ORAL NIGHTLY
Qty: 90 TABLET | Refills: 3 | Status: SHIPPED | OUTPATIENT
Start: 2020-05-04 | End: 2021-03-25 | Stop reason: SDUPTHER

## 2020-05-12 RX ORDER — LORAZEPAM 0.5 MG/1
TABLET ORAL
Qty: 60 TABLET | Refills: 0 | Status: SHIPPED | OUTPATIENT
Start: 2020-05-12 | End: 2020-06-12

## 2020-06-25 ENCOUNTER — OFFICE VISIT (OUTPATIENT)
Dept: PRIMARY CARE CLINIC | Age: 85
End: 2020-06-25
Payer: MEDICARE

## 2020-06-25 VITALS
DIASTOLIC BLOOD PRESSURE: 66 MMHG | HEART RATE: 55 BPM | SYSTOLIC BLOOD PRESSURE: 118 MMHG | OXYGEN SATURATION: 98 % | BODY MASS INDEX: 26.37 KG/M2 | WEIGHT: 183.8 LBS | TEMPERATURE: 97.1 F

## 2020-06-25 LAB — HBA1C MFR BLD: 6.7 %

## 2020-06-25 PROCEDURE — 1036F TOBACCO NON-USER: CPT | Performed by: FAMILY MEDICINE

## 2020-06-25 PROCEDURE — 4040F PNEUMOC VAC/ADMIN/RCVD: CPT | Performed by: FAMILY MEDICINE

## 2020-06-25 PROCEDURE — G8427 DOCREV CUR MEDS BY ELIG CLIN: HCPCS | Performed by: FAMILY MEDICINE

## 2020-06-25 PROCEDURE — 83036 HEMOGLOBIN GLYCOSYLATED A1C: CPT | Performed by: FAMILY MEDICINE

## 2020-06-25 PROCEDURE — 99213 OFFICE O/P EST LOW 20 MIN: CPT | Performed by: FAMILY MEDICINE

## 2020-06-25 PROCEDURE — 1123F ACP DISCUSS/DSCN MKR DOCD: CPT | Performed by: FAMILY MEDICINE

## 2020-06-25 PROCEDURE — G8417 CALC BMI ABV UP PARAM F/U: HCPCS | Performed by: FAMILY MEDICINE

## 2020-06-25 RX ORDER — LORAZEPAM 0.5 MG/1
0.5 TABLET ORAL EVERY 6 HOURS PRN
COMMUNITY
End: 2020-07-14

## 2020-06-25 RX ORDER — ACETAMINOPHEN 500 MG
500 TABLET ORAL EVERY 6 HOURS PRN
COMMUNITY

## 2020-06-25 ASSESSMENT — ENCOUNTER SYMPTOMS
WHEEZING: 0
VOMITING: 0
DIARRHEA: 0
SHORTNESS OF BREATH: 0
EYE REDNESS: 0
COUGH: 1
NAUSEA: 0
RHINORRHEA: 0
EYE DISCHARGE: 0
SORE THROAT: 0
ABDOMINAL PAIN: 0

## 2020-06-25 NOTE — PROGRESS NOTES
bruits  Pulmonary:      Effort: Pulmonary effort is normal. No respiratory distress. Breath sounds: Normal breath sounds. No wheezing. Lymphadenopathy:      Cervical: No cervical adenopathy. Skin:     General: Skin is warm. Findings: No rash. Neurological:      Mental Status: He is alert and oriented to person, place, and time. Psychiatric:         Behavior: Behavior normal.         Thought Content: Thought content normal.         Assessment:       Diagnosis Orders   1. Type 2 diabetes mellitus without complication, with long-term current use of insulin (HCC)  POCT glycosylated hemoglobin (Hb A1C)   2. Essential hypertension     3. Coronary artery disease involving native coronary artery of native heart without angina pectoris          Plan:      Return in about 3 months (around 9/25/2020) for DM check, HTN f/u. Orders Placed This Encounter   Procedures    POCT glycosylated hemoglobin (Hb A1C)     No orders of the defined types were placed in this encounter. Patient given educationalmaterials - see patient instructions. Discussed use, benefit, and side effectsof prescribed medications. All patient questions answered. Pt voiced understanding. Reviewed health maintenance. Instructed to continue current medications, diet andexercise. Patient agreed with treatment plan. Follow up as directed.      Electronicallysigned by Alexa Jones MD on 6/25/2020 at 9:45 AM

## 2020-07-14 RX ORDER — LORAZEPAM 0.5 MG/1
TABLET ORAL
Qty: 60 TABLET | Refills: 0 | Status: SHIPPED | OUTPATIENT
Start: 2020-07-14 | End: 2020-09-15

## 2020-07-17 ENCOUNTER — OFFICE VISIT (OUTPATIENT)
Dept: PRIMARY CARE CLINIC | Age: 85
End: 2020-07-17
Payer: MEDICARE

## 2020-07-17 VITALS
BODY MASS INDEX: 26.2 KG/M2 | SYSTOLIC BLOOD PRESSURE: 122 MMHG | TEMPERATURE: 98.2 F | WEIGHT: 183 LBS | HEIGHT: 70 IN | DIASTOLIC BLOOD PRESSURE: 60 MMHG | HEART RATE: 66 BPM | OXYGEN SATURATION: 98 %

## 2020-07-17 PROBLEM — K56.600 PARTIAL SMALL BOWEL OBSTRUCTION (HCC): Status: RESOLVED | Noted: 2019-11-07 | Resolved: 2020-07-17

## 2020-07-17 PROBLEM — Z00.00 MEDICARE ANNUAL WELLNESS VISIT, SUBSEQUENT: Status: ACTIVE | Noted: 2020-07-17

## 2020-07-17 PROCEDURE — G0438 PPPS, INITIAL VISIT: HCPCS | Performed by: FAMILY MEDICINE

## 2020-07-17 PROCEDURE — 1123F ACP DISCUSS/DSCN MKR DOCD: CPT | Performed by: FAMILY MEDICINE

## 2020-07-17 PROCEDURE — 4040F PNEUMOC VAC/ADMIN/RCVD: CPT | Performed by: FAMILY MEDICINE

## 2020-07-17 ASSESSMENT — PATIENT HEALTH QUESTIONNAIRE - PHQ9
SUM OF ALL RESPONSES TO PHQ QUESTIONS 1-9: 0
SUM OF ALL RESPONSES TO PHQ QUESTIONS 1-9: 0

## 2020-07-17 NOTE — PROGRESS NOTES
Medicare Annual Wellness Visit  Name: Joaquin Collins Date: 2020   MRN: [id number] Sex: Male   Age: 80 y.o. Ethnicity: Non-/Non    : 1935 Race: Tremaine Chin is here for Medicare AWV (Patient was given the words apple, christian and watch to remember. He was given the time of 10:45 to draw for clock drawing. )    Screenings for behavioral, psychosocial and functional/safety risks, and cognitive dysfunction are all negative except as indicated below. These results, as well as other patient data from the [address] form, are documented in Flowsheets linked to this Encounter. Allergies   Allergen Reactions    Sulfa Antibiotics Anaphylaxis       Prior to Visit Medications    Medication Sig Taking?  Authorizing Provider   LORazepam (ATIVAN) 0.5 MG tablet TAKE ONE TABLET BY MOUTH EVERY 6 HOURS AS NEEDED FOR ANXIETY Yes Vashti Harley MD   acetaminophen (TYLENOL) 500 MG tablet Take 500 mg by mouth every 6 hours as needed for Pain Yes Historical Provider, MD   atorvastatin (LIPITOR) 80 MG tablet Take 1 tablet by mouth nightly Yes SANAM Curiel - CNP   blood glucose test strips (FREESTYLE TEST STRIPS) strip USE TO TEST BLOOD SUGAR TWO TIMES A DAY AND AS NEEDED Yes Kiana Mathews PA-C   Insulin Pen Needle (ULTICARE SHORT PEN NEEDLES) 31G X 8 MM MISC Inject 1 each into the skin daily Yes Vashti Harley MD   metFORMIN (GLUCOPHAGE) 1000 MG tablet Take 1 tablet by mouth 2 times daily (with meals) Yes Vashti Harley MD   clopidogrel (PLAVIX) 75 MG tablet Take 1 tablet by mouth daily Yes Vashti Harley MD   rivaroxaban (XARELTO) 20 MG TABS tablet Take 1 tablet by mouth daily Yes Vashti Harley MD   insulin glargine (LANTUS SOLOSTAR) 100 UNIT/ML injection pen Inject 15 Units into the skin nightly Yes Vashti Harley MD   lisinopril-hydrochlorothiazide (PRINZIDE;ZESTORETIC) 20-12.5 MG per tablet Take 1 tablet by mouth daily Yes Timmy Browning MD   amLODIPine (NORVASC) 5 MG tablet Take 1 tablet by mouth daily Yes Timmy Browning MD   metoprolol succinate (TOPROL XL) 25 MG extended release tablet Take 1 tablet by mouth daily Yes Timmy Browning MD   ACCU-CHEK FASTCLIX LANCETS MISC USE LANCETS TO TEST BLOOD SUGAR TWICE A DAY AND AS NEEDED Yes Timmy Browning MD   Lancet Device MISC 1 Device by Does not apply route once for 1 dose Yes Timmy Browning MD   nitroGLYCERIN (NITROSTAT) 0.4 MG SL tablet Place 1 tablet under the tongue every 5 minutes as needed for Chest pain Yes Colleen Santamaria MD   Coenzyme Q10 (CO Q-10) 30 MG CAPS Take 30 mg by mouth daily Yes Historical Provider, MD   Ascorbic Acid (VITAMIN C) 250 MG tablet Take 250 mg by mouth daily Yes Historical Provider, MD   ferrous sulfate 325 (65 FE) MG tablet Take 325 mg by mouth daily  Yes Historical Provider, MD   Melatonin 5 MG CAPS Take 5 mg by mouth nightly  Patient not taking: Reported on 7/17/2020  Timmy Browning MD   fluticasone Tsosie Donley) 50 MCG/ACT nasal spray 1 spray by Each Nare route daily  Patient not taking: Reported on 3/12/2020  Timmy Browning MD       Past Medical History:   Diagnosis Date    [facility]     Diabetes mellitus ([facility]     H/O cataract     Hyperlipidemia     Hypertension     MI (myocardial infarction) ([facility] 5    Peyronie's disease     TIA (transient ischemic attack) 1998       Past Surgical History:   Procedure Laterality Date    CARDIAC CATHETERIZATION      CARDIAC SURGERY  01/2018    3 stents    PTCA         Family History   Problem Relation Age of Onset    Cancer Mother     Other Mother         stroke syndrome    Heart Disease Father     High Blood Pressure Brother        CareTeam (Including outside providers/suppliers regularly involved in providing care):   Patient Care Team:  Timmy Browning MD as PCP - General when appropriate. Care Preferences:    Hospitalization: \"If your health worsens and it becomes clear that your chance of recovery is unlikely, what would your preference be regarding hospitalization? \"  If the patient would want hospitalization, answer \"yes\". If the patient would prefer comfort-focused treatment without hospitalization, answer \"no\". YES/NO/WILD CARDS: no      Ventilation: \"If you were in your present state of health and suddenly became very ill and were unable to breathe on your own, what would your preference be about the use of a ventilator (breathing machine) if it was available to you? \"    If patient would desire the use of a ventilator (breathing machine), answer \"yes\", if not answer \"no\":yes    \"If your health worsens and it becomes clear that your chance of recovery is unlikely, what would your preference be about the use of a ventilator (breathing machine) if it was available to you? \"   no    Resuscitation:  \"CPR works best to restart the heart when there is a sudden event, like a heart attack, in someone who is otherwise healthy. Unfortunately, CPR does not typically restart the heart for people who have serious health conditions or who are very sick. \"    \"In the event your heart stopped as a result of an underlying serious health condition, would you want attempts to be made to restart your heart (answer \"yes\" for attempt to resuscitate) or would you prefer a natural death (answer \"no\" for do not attempt to resuscitate)? \"   no     NOTE: If the patient has a valid advance directive AND provides care preference(s) that are inconsistent with that prior directive, advise the patient to consider either: creating a new advance directive that complies with state-specific requirements; or, if that is not possible, orally revoking that prior directive in accordance with state-specific requirements, which must be documented in the EHR.     Conversation Outcomes / Follow-Up Plan:   pt brought living will      Length of Voluntary ACP Conversation in minutes:  <16 minutes (Non-Billable)    Vel Shanks          There are no diagnoses linked to this encounter.

## 2020-08-16 PROBLEM — Z00.00 MEDICARE ANNUAL WELLNESS VISIT, SUBSEQUENT: Status: RESOLVED | Noted: 2020-07-17 | Resolved: 2020-08-16

## 2020-09-11 RX ORDER — LANCETS
EACH MISCELLANEOUS
Qty: 200 EACH | Refills: 3 | Status: SHIPPED | OUTPATIENT
Start: 2020-09-11 | End: 2021-11-11

## 2020-09-15 RX ORDER — LORAZEPAM 0.5 MG/1
TABLET ORAL
Qty: 60 TABLET | Refills: 0 | Status: SHIPPED | OUTPATIENT
Start: 2020-09-15 | End: 2020-10-16

## 2020-10-23 ENCOUNTER — OFFICE VISIT (OUTPATIENT)
Dept: PRIMARY CARE CLINIC | Age: 85
End: 2020-10-23
Payer: MEDICARE

## 2020-10-23 VITALS
HEART RATE: 56 BPM | TEMPERATURE: 96.8 F | WEIGHT: 178.8 LBS | SYSTOLIC BLOOD PRESSURE: 120 MMHG | OXYGEN SATURATION: 98 % | DIASTOLIC BLOOD PRESSURE: 60 MMHG | BODY MASS INDEX: 25.66 KG/M2

## 2020-10-23 LAB — HBA1C MFR BLD: 6.4 %

## 2020-10-23 PROCEDURE — G8427 DOCREV CUR MEDS BY ELIG CLIN: HCPCS | Performed by: FAMILY MEDICINE

## 2020-10-23 PROCEDURE — 4040F PNEUMOC VAC/ADMIN/RCVD: CPT | Performed by: FAMILY MEDICINE

## 2020-10-23 PROCEDURE — 1123F ACP DISCUSS/DSCN MKR DOCD: CPT | Performed by: FAMILY MEDICINE

## 2020-10-23 PROCEDURE — 83036 HEMOGLOBIN GLYCOSYLATED A1C: CPT | Performed by: FAMILY MEDICINE

## 2020-10-23 PROCEDURE — G8484 FLU IMMUNIZE NO ADMIN: HCPCS | Performed by: FAMILY MEDICINE

## 2020-10-23 PROCEDURE — 99213 OFFICE O/P EST LOW 20 MIN: CPT | Performed by: FAMILY MEDICINE

## 2020-10-23 PROCEDURE — 90694 VACC AIIV4 NO PRSRV 0.5ML IM: CPT | Performed by: FAMILY MEDICINE

## 2020-10-23 PROCEDURE — G8417 CALC BMI ABV UP PARAM F/U: HCPCS | Performed by: FAMILY MEDICINE

## 2020-10-23 PROCEDURE — G0008 ADMIN INFLUENZA VIRUS VAC: HCPCS | Performed by: FAMILY MEDICINE

## 2020-10-23 PROCEDURE — 1036F TOBACCO NON-USER: CPT | Performed by: FAMILY MEDICINE

## 2020-10-23 RX ORDER — LORAZEPAM 0.5 MG/1
0.5 TABLET ORAL EVERY 6 HOURS PRN
COMMUNITY
End: 2020-11-02

## 2020-10-23 RX ORDER — INSULIN GLARGINE 100 [IU]/ML
12 INJECTION, SOLUTION SUBCUTANEOUS NIGHTLY
Qty: 15 ML | Refills: 3
Start: 2020-10-23 | End: 2021-04-23

## 2020-10-23 ASSESSMENT — ENCOUNTER SYMPTOMS
VOMITING: 0
SHORTNESS OF BREATH: 0
NAUSEA: 0
WHEEZING: 0
DIARRHEA: 0
EYE REDNESS: 0
ABDOMINAL PAIN: 1
EYE DISCHARGE: 0
RHINORRHEA: 0
COUGH: 0
SORE THROAT: 0

## 2020-10-23 NOTE — PROGRESS NOTES
scleral icterus. Right eye: No discharge. Left eye: No discharge. Conjunctiva/sclera: Conjunctivae normal.      Pupils: Pupils are equal, round, and reactive to light. Neck:      Thyroid: No thyromegaly. Trachea: No tracheal deviation. Cardiovascular:      Rate and Rhythm: Normal rate and regular rhythm. Heart sounds: Normal heart sounds. Pulmonary:      Effort: Pulmonary effort is normal. No respiratory distress. Breath sounds: Normal breath sounds. No wheezing. Lymphadenopathy:      Cervical: No cervical adenopathy. Skin:     General: Skin is warm. Findings: No rash. Neurological:      Mental Status: He is alert and oriented to person, place, and time. Psychiatric:         Mood and Affect: Mood normal.         Behavior: Behavior normal.         Thought Content: Thought content normal.         Assessment:       Diagnosis Orders   1. Type 2 diabetes mellitus without complication, with long-term current use of insulin (HCC)  POCT glycosylated hemoglobin (Hb A1C)    insulin glargine (LANTUS SOLOSTAR) 100 UNIT/ML injection pen   2. Need for influenza vaccination  INFLUENZA, QUADV, ADJUVANTED, 65 YRS =, IM, PF, PREFILL SYR, 0.5ML (FLUAD)   3. Essential hypertension     4. Coronary artery disease involving native coronary artery of native heart without angina pectoris          Plan:      Return in about 3 months (around 1/23/2021) for DM check, HTN f/u. Orders Placed This Encounter   Procedures    INFLUENZA, QUADV, ADJUVANTED, 65 YRS =, IM, PF, PREFILL SYR, 0.5ML (FLUAD)    POCT glycosylated hemoglobin (Hb A1C)     Orders Placed This Encounter   Medications    insulin glargine (LANTUS SOLOSTAR) 100 UNIT/ML injection pen     Sig: Inject 12 Units into the skin nightly     Dispense:  15 mL     Refill:  3       Patient given educationalmaterials - see patient instructions. Discussed use, benefit, and side effectsof prescribed medications.   All patient questions answered. Pt voiced understanding. Reviewed health maintenance. Instructed to continue current medications, diet andexercise. Patient agreed with treatment plan. Follow up as directed.      Electronicallysigned by Nupur Kaur MD on 10/23/2020 at 9:32 AM

## 2020-11-02 RX ORDER — LORAZEPAM 0.5 MG/1
TABLET ORAL
Qty: 60 TABLET | Refills: 0 | Status: SHIPPED | OUTPATIENT
Start: 2020-11-02 | End: 2021-01-04

## 2020-11-02 RX ORDER — METOPROLOL SUCCINATE 25 MG/1
TABLET, EXTENDED RELEASE ORAL
Qty: 90 TABLET | Refills: 3 | Status: SHIPPED | OUTPATIENT
Start: 2020-11-02 | End: 2021-08-20 | Stop reason: SDUPTHER

## 2020-11-02 NOTE — TELEPHONE ENCOUNTER
OV 10/23/20    Rx 1/8/20    metoprolol succinate (TOPROL XL) 25 MG extended release tablet    [address], [address]

## 2020-12-07 RX ORDER — AMLODIPINE BESYLATE 5 MG/1
TABLET ORAL
Qty: 90 TABLET | Refills: 3 | Status: SHIPPED | OUTPATIENT
Start: 2020-12-07 | End: 2021-12-03

## 2020-12-07 RX ORDER — LISINOPRIL AND HYDROCHLOROTHIAZIDE 20; 12.5 MG/1; MG/1
TABLET ORAL
Qty: 90 TABLET | Refills: 3 | Status: SHIPPED | OUTPATIENT
Start: 2020-12-07 | End: 2021-12-03

## 2020-12-11 ENCOUNTER — PROCEDURE VISIT (OUTPATIENT)
Dept: PRIMARY CARE CLINIC | Age: 85
End: 2020-12-11
Payer: MEDICARE

## 2020-12-11 ENCOUNTER — HOSPITAL ENCOUNTER (OUTPATIENT)
Age: 85
Setting detail: SPECIMEN
Discharge: HOME | End: 2020-12-11
Payer: MEDICARE

## 2020-12-11 VITALS
DIASTOLIC BLOOD PRESSURE: 62 MMHG | WEIGHT: 181.8 LBS | OXYGEN SATURATION: 99 % | BODY MASS INDEX: 26.09 KG/M2 | HEART RATE: 96 BPM | SYSTOLIC BLOOD PRESSURE: 136 MMHG

## 2020-12-11 PROCEDURE — 11441 EXC FACE-MM B9+MARG 0.6-1 CM: CPT | Performed by: PHYSICIAN ASSISTANT

## 2020-12-11 PROCEDURE — 17000 DESTRUCT PREMALG LESION: CPT | Performed by: PHYSICIAN ASSISTANT

## 2020-12-11 NOTE — PROGRESS NOTES
Skin Biopsy Procedure Note  12/11/2020  Idania Carlson  1935    /62   Pulse 96   Wt 181 lb 12.8 oz (82.5 kg)   SpO2 99%   BMI 26.09 kg/m²   VITALS REVIEWED    Allergies   Allergen Reactions    Sulfa Antibiotics Anaphylaxis       Chief Complaint   Patient presents with    Procedure     shave biopsy       Lesion:  · 1. Forehead        Indication for Biopsy 1: Bleeding SK      Procedure: The lesion(s) was cleansed with Alcohol.  2% lidocaine with epinephrine was used for local anaesthesia. A 1cm  shave was used to obtain a specimen. The specimen(s) was    preserved and sent for pathological examination. The biopsy site(s) was  cleansed and hemostasis using ACl and a pressure dressing(s) was achieved with good results. The patient was instructed on wound care. No complications occurred and the patient tolerated the procedure well. Diagnosis Orders   1. Neoplasm of uncertain behavior of skin  Derm biopsy       Follow Up: Wound care discussed. Keep well moisturized. Watch for signs of infection which would include:  Redness, swelling, fever. If signs appear, please return to office. Return in 3 days (on 12/14/2020) for with me for recheck of biopsy site.        Electronically signed by Joanie Conde PA-C on 12/11/2020 at 2:26 PM

## 2020-12-11 NOTE — PROGRESS NOTES
[facility] Keratoses destruction procedure note:  12/11/2020  Minesh Fuse  1935    /62   Pulse 96   Wt 181 lb 12.8 oz (82.5 kg)   SpO2 99%   BMI 26.09 kg/m²   ALL VITALS REVIEWED    Allergies   Allergen Reactions    Sulfa Antibiotics Anaphylaxis       Chief Complaint   Patient presents with    Procedure     shave biopsy     Written consent was obtained. Lesion(s) cleansed with Alcohol. Location:  Upper left lip    Histofreeze applied with applicator. Numberlesions treated: 1    Physical Exam  HENT:      Head:        Comments: AK--upper lip    SK lesion removal on forehead        Patient tolerated procedure well. Diagnosis Orders   1. Neoplasm of uncertain behavior of skin  Derm biopsy       Follow Up: Wound care discussed. Keep well moisturized. Watch for signs of infection which would include:  Redness, swelling, fever. If signs appear, please return to office. Return in 3 days (on 12/14/2020) for with me for recheck of biopsy site.     Electronically signed by Briana Mehta PA-C on 12/11/2020 at 2:28 PM

## 2020-12-14 ENCOUNTER — OFFICE VISIT (OUTPATIENT)
Dept: PRIMARY CARE CLINIC | Age: 85
End: 2020-12-14

## 2020-12-14 VITALS
TEMPERATURE: 96.4 F | HEART RATE: 65 BPM | OXYGEN SATURATION: 98 % | BODY MASS INDEX: 25.91 KG/M2 | HEIGHT: 70 IN | DIASTOLIC BLOOD PRESSURE: 64 MMHG | SYSTOLIC BLOOD PRESSURE: 134 MMHG | WEIGHT: 181 LBS

## 2020-12-14 PROBLEM — Z98.890 S/P SKIN BIOPSY: Status: ACTIVE | Noted: 2020-12-14

## 2020-12-14 PROCEDURE — 99999 PR OFFICE/OUTPT VISIT,PROCEDURE ONLY: CPT | Performed by: PHYSICIAN ASSISTANT

## 2020-12-14 ASSESSMENT — ENCOUNTER SYMPTOMS
RESPIRATORY NEGATIVE: 1
ABDOMINAL PAIN: 0
EYES NEGATIVE: 1
COLOR CHANGE: 0

## 2020-12-14 NOTE — PROGRESS NOTES
[facility]  [address] [address] [address]  Dept: [phone]    Edgard Abebe is a 80 y.o. male who presents today for his medical conditions/complaintsas noted below. Chief Complaint   Patient presents with    Other     check bx site from shave bx on friday       HPI:     HPI  Had a shave biopsy on a scalp lesion on Friday and it bleed quite a bit   Also treated an AK on left face.    No further bleeding happened  LDL Cholesterol (mg/dL)   Date Value   01/26/2018 39   10/05/2015 51     LDL Calculated (mg/dL)   Date Value   03/06/2018 54   10/18/2016 70       (goal LDL is <100)   AST (U/L)   Date Value   03/12/2020 23     ALT (U/L)   Date Value   03/12/2020 17     BUN (mg/dL)   Date Value   03/12/2020 26 (H)     BP Readings from Last 3 Encounters:   12/14/20 134/64   12/11/20 136/62   10/23/20 120/60          (goal 120/80)    Past Medical History:   Diagnosis Date    Acute myocardial ischemia ([address] 12/11/2015    Acute sinusitis 12/11/2015    Cerebral artery occlusion with cerebral infarction [facility])     Chest pain 10/5/2015    Diabetes mellitus ([address]     H/O cataract     Hyperlipidemia     Hypertension     MI (myocardial infarction) ([address] 1985    Neck stiffness 12/11/2015    Partial small bowel obstruction (HCC) 11/7/2019    Peyronie's disease     Rotator cuff tendonitis 12/11/2015    TIA (transient ischemic attack) 1998      Past Surgical History:   Procedure Laterality Date    CARDIAC CATHETERIZATION      mulitple caths- 5 stents total    CARDIAC SURGERY  01/2018    3 stents    CATARACT REMOVAL WITH IMPLANT Left     SKIN BIOPSY      forehead       Family History   Problem Relation Age of Onset    Cancer Mother         oral    Stroke Father     High Blood Pressure Brother     Heart Disease Brother     Cancer Maternal Grandmother     Diabetes type 2  Son     Heart Attack Son     Heart Disease Maternal Grandfather     Stroke Paternal Grandmother     Heart Disease Paternal Grandfather         ?        Social History     Tobacco Use    Smoking status: Former Smoker     Packs/day: 0.50     Years: 8.00     Pack years: 4.00    Smokeless tobacco: Never Used   Substance Use Topics    Alcohol use: No      Current Outpatient Medications   Medication Sig Dispense Refill    lisinopril-hydroCHLOROthiazide (PRINZIDE;ZESTORETIC) 20-12.5 MG per tablet TAKE 1 TABLET DAILY 90 tablet 3    amLODIPine (NORVASC) 5 MG tablet TAKE 1 TABLET DAILY 90 tablet 3    metoprolol succinate (TOPROL XL) 25 MG extended release tablet TAKE 1 TABLET DAILY 90 tablet 3    insulin glargine (LANTUS SOLOSTAR) 100 UNIT/ML injection pen Inject 12 Units into the skin nightly 15 mL 3    Accu-Chek FastClix Lancets [facility] USE ONE LANCET TO TEST TWICE A DAY AND AS NEEDED 200 each 3    acetaminophen (TYLENOL) 500 MG tablet Take 500 mg by mouth every 6 hours as needed for Pain      atorvastatin (LIPITOR) 80 MG tablet Take 1 tablet by mouth nightly 90 tablet 3    blood glucose test strips (FREESTYLE TEST STRIPS) strip USE TO TEST BLOOD SUGAR TWO TIMES A DAY AND AS NEEDED 150 strip 2    Insulin Pen Needle (ULTICARE SHORT PEN NEEDLES) 31G X 8 MM MISC Inject 1 each into the skin daily 100 each 3    metFORMIN (GLUCOPHAGE) 1000 MG tablet Take 1 tablet by mouth 2 times daily (with meals) 180 tablet 3    clopidogrel (PLAVIX) 75 MG tablet Take 1 tablet by mouth daily 90 tablet 3    Melatonin 5 MG CAPS Take 5 mg by mouth nightly 30 capsule 0    rivaroxaban (XARELTO) 20 MG TABS tablet Take 1 tablet by mouth daily 90 tablet 3    nitroGLYCERIN (NITROSTAT) 0.4 MG SL tablet Place 1 tablet under the tongue every 5 minutes as needed for Chest pain 25 tablet 1    Coenzyme Q10 (CO Q-10) 30 MG CAPS Take 30 mg by mouth daily      Ascorbic Acid (VITAMIN C) 250 MG tablet Take 250 mg by mouth daily      ferrous sulfate 325 (65 FE) MG tablet Take 325 mg by mouth daily       fluticasone (FLONASE) 50 MCG/ACT nasal spray 1 spray by Each Nare route daily (Patient not taking: Reported on 3/12/2020) 1 Bottle 3    Lancet Device MISC 1 Device by Does not apply route once for 1 dose 1 Device 0     No current facility-administered medications for this visit. Allergies   Allergen Reactions    Sulfa Antibiotics Anaphylaxis       Health Maintenance   Topic Date Due    DTaP/Tdap/Td vaccine (1 - Tdap) 12/15/1954    Shingles Vaccine (1 of 2) 12/15/1985    Lipid screen  03/06/2019    Potassium monitoring  03/12/2021    Creatinine monitoring  03/12/2021    Annual Wellness Visit (AWV)  07/18/2021    Flu vaccine  Completed    Pneumococcal 65+ years Vaccine  Completed    Hepatitis A vaccine  Aged Out    Hib vaccine  Aged Out    Meningococcal (ACWY) vaccine  Aged Out       Subjective:      Review of Systems   Constitutional: Negative for chills, diaphoresis, fever and unexpected weight change. HENT: Negative. Negative for mouth sores. Eyes: Negative. Respiratory: Negative. Cardiovascular: Negative. Gastrointestinal: Negative for abdominal pain. Musculoskeletal: Negative for arthralgias and myalgias. Skin: Negative for color change, pallor, rash and wound. Allergic/Immunologic: Negative for environmental allergies, food allergies and immunocompromised state. Hematological: Negative for adenopathy. Objective:     /64   Pulse 65   Temp 96.4 °F (35.8 °C)   Ht 5' 10\" (1.778 m)   Wt 181 lb (82.1 kg)   SpO2 98%   BMI 25.97 kg/m²   Physical Exam  Vitals signs and nursing note reviewed. Constitutional:       Appearance: Normal appearance. Skin:     Comments: Biopsy site is without bleeding and no sign of infection    AK is red and crusting on left upper lip area   Neurological:      Mental Status: He is alert. Assessment:       Diagnosis Orders   1. AK (actinic keratosis)     2.  S/P skin biopsy          Plan:    Cicalfate samples given to use on both areas  Did discuss Efudex in future for scalp and face. Return in about 2 months (around 2/14/2021) for Friday for cryotherapy and possible bx---1/2 hour. No orders of the defined types were placed in this encounter. No orders of the defined types were placed in this encounter. Patient given educationalmaterials - see patient instructions. Discussed use, benefit, and side effectsof prescribed medications. All patient questions answered. Pt voiced understanding. Reviewed health maintenance. Instructed to continue current medications, diet andexercise. Patient agreed with treatment plan. Follow up as directed.      Electronicallysigned by Brenden Mahmood PA-C on 12/14/2020 at 11:19 AM

## 2020-12-15 LAB — DERMATOLOGY PATHOLOGY REPORT: NORMAL

## 2021-01-03 DIAGNOSIS — F41.9 ANXIETY: ICD-10-CM

## 2021-01-04 RX ORDER — LORAZEPAM 0.5 MG/1
TABLET ORAL
Qty: 60 TABLET | Refills: 0 | Status: SHIPPED | OUTPATIENT
Start: 2021-01-04 | End: 2021-03-05

## 2021-01-21 ENCOUNTER — OFFICE VISIT (OUTPATIENT)
Dept: PRIMARY CARE CLINIC | Age: 86
End: 2021-01-21
Payer: MEDICARE

## 2021-01-21 VITALS
WEIGHT: 184 LBS | DIASTOLIC BLOOD PRESSURE: 60 MMHG | SYSTOLIC BLOOD PRESSURE: 132 MMHG | TEMPERATURE: 97 F | BODY MASS INDEX: 26.4 KG/M2 | OXYGEN SATURATION: 98 % | HEART RATE: 55 BPM

## 2021-01-21 DIAGNOSIS — E11.9 TYPE 2 DIABETES MELLITUS WITHOUT COMPLICATION, WITH LONG-TERM CURRENT USE OF INSULIN (HCC): Primary | ICD-10-CM

## 2021-01-21 DIAGNOSIS — E11.319 DIABETIC RETINOPATHY OF BOTH EYES WITHOUT MACULAR EDEMA ASSOCIATED WITH TYPE 2 DIABETES MELLITUS, UNSPECIFIED RETINOPATHY SEVERITY (HCC): ICD-10-CM

## 2021-01-21 DIAGNOSIS — I20.9 ANGINA PECTORIS, UNSPECIFIED (HCC): ICD-10-CM

## 2021-01-21 DIAGNOSIS — I48.0 PAROXYSMAL ATRIAL FIBRILLATION (HCC): ICD-10-CM

## 2021-01-21 DIAGNOSIS — Z79.4 TYPE 2 DIABETES MELLITUS WITHOUT COMPLICATION, WITH LONG-TERM CURRENT USE OF INSULIN (HCC): Primary | ICD-10-CM

## 2021-01-21 DIAGNOSIS — I25.10 CORONARY ARTERY DISEASE INVOLVING NATIVE CORONARY ARTERY OF NATIVE HEART WITHOUT ANGINA PECTORIS: ICD-10-CM

## 2021-01-21 DIAGNOSIS — R10.84 GENERALIZED ABDOMINAL PAIN: ICD-10-CM

## 2021-01-21 LAB — HBA1C MFR BLD: 6.7 %

## 2021-01-21 PROCEDURE — 1036F TOBACCO NON-USER: CPT | Performed by: FAMILY MEDICINE

## 2021-01-21 PROCEDURE — G8427 DOCREV CUR MEDS BY ELIG CLIN: HCPCS | Performed by: FAMILY MEDICINE

## 2021-01-21 PROCEDURE — G8484 FLU IMMUNIZE NO ADMIN: HCPCS | Performed by: FAMILY MEDICINE

## 2021-01-21 PROCEDURE — 83036 HEMOGLOBIN GLYCOSYLATED A1C: CPT | Performed by: FAMILY MEDICINE

## 2021-01-21 PROCEDURE — 1123F ACP DISCUSS/DSCN MKR DOCD: CPT | Performed by: FAMILY MEDICINE

## 2021-01-21 PROCEDURE — G8417 CALC BMI ABV UP PARAM F/U: HCPCS | Performed by: FAMILY MEDICINE

## 2021-01-21 PROCEDURE — 4040F PNEUMOC VAC/ADMIN/RCVD: CPT | Performed by: FAMILY MEDICINE

## 2021-01-21 PROCEDURE — 99213 OFFICE O/P EST LOW 20 MIN: CPT | Performed by: FAMILY MEDICINE

## 2021-01-21 RX ORDER — METFORMIN HYDROCHLORIDE 500 MG/1
1000 TABLET, EXTENDED RELEASE ORAL
Qty: 180 TABLET | Refills: 1 | Status: SHIPPED | OUTPATIENT
Start: 2021-01-21 | End: 2021-07-19

## 2021-01-21 SDOH — ECONOMIC STABILITY: FOOD INSECURITY: WITHIN THE PAST 12 MONTHS, THE FOOD YOU BOUGHT JUST DIDN'T LAST AND YOU DIDN'T HAVE MONEY TO GET MORE.: NEVER TRUE

## 2021-01-21 ASSESSMENT — ENCOUNTER SYMPTOMS
RHINORRHEA: 0
COUGH: 0
DIARRHEA: 0
NAUSEA: 0
VOMITING: 0
SORE THROAT: 0
WHEEZING: 0
CHEST TIGHTNESS: 0
EYE REDNESS: 0
SHORTNESS OF BREATH: 0
EYE DISCHARGE: 0
ABDOMINAL PAIN: 1

## 2021-01-21 ASSESSMENT — PATIENT HEALTH QUESTIONNAIRE - PHQ9
2. FEELING DOWN, DEPRESSED OR HOPELESS: 0
1. LITTLE INTEREST OR PLEASURE IN DOING THINGS: 0
SUM OF ALL RESPONSES TO PHQ QUESTIONS 1-9: 0

## 2021-01-21 NOTE — PROGRESS NOTES
[facility] PRIMARY CARE  [address] [facility] [address]  Dept: [phone]    Tiny Oquendo is a 80 y.o. male Established patient, who presents today for his medical conditions/complaintsas noted below  Chief Complaint   Patient presents with    Diabetes     Patient checks bs at home bid    Hypertension     Patient has not been checking bp at home    Medication Check       HPI:     HPI  Readings a higher but otherwise doing okay. Tired all the time- no energy.     Reviewed prior notes None  Reviewed previous Labs    Hemoglobin A1C (%)   Date Value   01/21/2021 6.7   10/23/2020 6.4   06/25/2020 6.7             ( goal A1C is < 7)   No results found for: LABMICR  LDL Cholesterol (mg/dL)   Date Value   01/26/2018 39   10/05/2015 51     LDL Calculated (mg/dL)   Date Value   03/06/2018 54   10/18/2016 70       (goal LDL is <100)   AST (U/L)   Date Value   03/12/2020 23     ALT (U/L)   Date Value   03/12/2020 17     BUN (mg/dL)   Date Value   03/12/2020 26 (H)     BP Readings from Last 3 Encounters:   01/21/21 132/60   12/14/20 134/64   12/11/20 136/62          (goal 140/90)    Past Medical History:   Diagnosis Date    Acute myocardial ischemia ([address] 12/11/2015    Acute sinusitis 12/11/2015    Cerebral artery occlusion with cerebral infarction [facility])     Chest pain 10/5/2015    Diabetes mellitus ([address]     H/O cataract     Hyperlipidemia     Hypertension     MI (myocardial infarction) ([address] 1985    Neck stiffness 12/11/2015    Partial small bowel obstruction ([address] 11/7/2019    Peyronie's disease     Rotator cuff tendonitis 12/11/2015    TIA (transient ischemic attack) 1998        Social History     Tobacco Use    Smoking status: Former Smoker     Packs/day: 0.50     Years: 8.00     Pack years: 4.00    Smokeless tobacco: Never Used   Substance Use Topics    Alcohol use: No      Current Outpatient Medications   Medication Sig Dispense Refill    LORazepam (ATIVAN) 0.5 MG tablet TAKE ONE TABLET BY MOUTH EVERY 6 HOURS AS NEEDED FOR ANXIETY 60 tablet 0    lisinopril-hydroCHLOROthiazide (PRINZIDE;ZESTORETIC) 20-12.5 MG per tablet TAKE 1 TABLET DAILY 90 tablet 3    amLODIPine (NORVASC) 5 MG tablet TAKE 1 TABLET DAILY 90 tablet 3    metoprolol succinate (TOPROL XL) 25 MG extended release tablet TAKE 1 TABLET DAILY 90 tablet 3    insulin glargine (LANTUS SOLOSTAR) 100 UNIT/ML injection pen Inject 12 Units into the skin nightly 15 mL 3    Accu-Chek FastClix Lancets [facility] USE ONE LANCET TO TEST TWICE A DAY AND AS NEEDED 200 each 3    acetaminophen (TYLENOL) 500 MG tablet Take 500 mg by mouth every 6 hours as needed for Pain      atorvastatin (LIPITOR) 80 MG tablet Take 1 tablet by mouth nightly 90 tablet 3    blood glucose test strips (FREESTYLE TEST STRIPS) strip USE TO TEST BLOOD SUGAR TWO TIMES A DAY AND AS NEEDED 150 strip 2    Insulin Pen Needle (ULTICARE SHORT PEN NEEDLES) 31G X 8 MM MISC Inject 1 each into the skin daily 100 each 3    metFORMIN (GLUCOPHAGE) 1000 MG tablet Take 1 tablet by mouth 2 times daily (with meals) 180 tablet 3    clopidogrel (PLAVIX) 75 MG tablet Take 1 tablet by mouth daily 90 tablet 3    Melatonin 5 MG CAPS Take 5 mg by mouth nightly 30 capsule 0    rivaroxaban (XARELTO) 20 MG TABS tablet Take 1 tablet by mouth daily 90 tablet 3    Coenzyme Q10 (CO Q-10) 30 MG CAPS Take 30 mg by mouth daily      Ascorbic Acid (VITAMIN C) 250 MG tablet Take 250 mg by mouth daily      ferrous sulfate 325 (65 FE) MG tablet Take 325 mg by mouth daily       fluticasone (FLONASE) 50 MCG/ACT nasal spray 1 spray by Each Nare route daily (Patient not taking: Reported on 3/12/2020) 1 Bottle 3    Lancet Device MISC 1 Device by Does not apply route once for 1 dose 1 Device 0    nitroGLYCERIN (NITROSTAT) 0.4 MG SL tablet Place 1 tablet under the tongue every 5 minutes as needed for Chest pain 25 tablet 1     No current facility-administered light.   Neck:      Thyroid: No thyromegaly. Trachea: No tracheal deviation. Cardiovascular:      Rate and Rhythm: Normal rate and regular rhythm. Heart sounds: Normal heart sounds. Pulmonary:      Effort: Pulmonary effort is normal. No respiratory distress. Breath sounds: Normal breath sounds. No wheezing. Lymphadenopathy:      Cervical: No cervical adenopathy. Skin:     General: Skin is warm. Findings: No rash. Neurological:      Mental Status: He is alert and oriented to person, place, and time. Psychiatric:         Mood and Affect: Mood normal.         Behavior: Behavior normal.         Thought Content: Thought content normal.         Assessment:       Diagnosis Orders   1. Type 2 diabetes mellitus without complication, with long-term current use of insulin (HCC)  POCT glycosylated hemoglobin (Hb A1C)    Comprehensive Metabolic Panel    Lipid Panel    CBC Auto Differential   2. Coronary artery disease involving native coronary artery of native heart without angina pectoris  Comprehensive Metabolic Panel    Lipid Panel    CBC Auto Differential   3. Paroxysmal atrial fibrillation (HCC)  Comprehensive Metabolic Panel    Lipid Panel    CBC Auto Differential   4. Generalized abdominal pain      probably due to metformin but sugars are good and will leave alone for now. Plan:      Return in about 3 months (around 4/21/2021) for DM check, medication f/u. Orders Placed This Encounter   Procedures    Comprehensive Metabolic Panel     Standing Status:   Future     Standing Expiration Date:   1/22/2022    Lipid Panel     Standing Status:   Future     Standing Expiration Date:   1/22/2022     Order Specific Question:   Is Patient Fasting?/# of Hours     Answer:   12    CBC Auto Differential     Standing Status:   Future     Standing Expiration Date:   1/22/2022    POCT glycosylated hemoglobin (Hb A1C)     No orders of the defined types were placed in this encounter.       Patient given educationalmaterials - see patient instructions. Discussed use, benefit, and side effectsof prescribed medications. All patient questions answered. Pt voiced understanding. Reviewed health maintenance. Instructed to continue current medications, diet andexercise. Patient agreed with treatment plan. Follow up as directed.      Electronicallysigned by Tyler Jarrell MD on 1/21/2021 at 10:51 AM

## 2021-02-08 LAB
ALBUMIN SERPL-MCNC: NORMAL G/DL
ALP BLD-CCNC: NORMAL U/L
ALT SERPL-CCNC: NORMAL U/L
ANION GAP SERPL CALCULATED.3IONS-SCNC: NORMAL MMOL/L
AST SERPL-CCNC: NORMAL U/L
BASOPHILS ABSOLUTE: NORMAL
BASOPHILS RELATIVE PERCENT: NORMAL
BILIRUB SERPL-MCNC: NORMAL MG/DL
BUN BLDV-MCNC: NORMAL MG/DL
CALCIUM SERPL-MCNC: NORMAL MG/DL
CHLORIDE BLD-SCNC: NORMAL MMOL/L
CHOLESTEROL, TOTAL: 117 MG/DL
CHOLESTEROL/HDL RATIO: 2.7
CO2: NORMAL
CREAT SERPL-MCNC: NORMAL MG/DL
EOSINOPHILS ABSOLUTE: NORMAL
EOSINOPHILS RELATIVE PERCENT: NORMAL
GFR CALCULATED: NORMAL
GLUCOSE BLD-MCNC: 150 MG/DL
HCT VFR BLD CALC: NORMAL %
HDLC SERPL-MCNC: 43 MG/DL (ref 35–70)
HEMOGLOBIN: NORMAL
LDL CHOLESTEROL CALCULATED: 51 MG/DL (ref 0–160)
LYMPHOCYTES ABSOLUTE: NORMAL
LYMPHOCYTES RELATIVE PERCENT: NORMAL
MCH RBC QN AUTO: NORMAL PG
MCHC RBC AUTO-ENTMCNC: NORMAL G/DL
MCV RBC AUTO: NORMAL FL
MONOCYTES ABSOLUTE: NORMAL
MONOCYTES RELATIVE PERCENT: NORMAL
NEUTROPHILS ABSOLUTE: NORMAL
NEUTROPHILS RELATIVE PERCENT: NORMAL
NONHDLC SERPL-MCNC: NORMAL MG/DL
PDW BLD-RTO: NORMAL %
PLATELET # BLD: NORMAL 10*3/UL
PMV BLD AUTO: NORMAL FL
POTASSIUM SERPL-SCNC: NORMAL MMOL/L
RBC # BLD: NORMAL 10*6/UL
SODIUM BLD-SCNC: NORMAL MMOL/L
TOTAL PROTEIN: NORMAL
TRIGL SERPL-MCNC: 115 MG/DL
VLDLC SERPL CALC-MCNC: 23 MG/DL
WBC # BLD: NORMAL 10*3/UL

## 2021-02-12 ENCOUNTER — HOSPITAL ENCOUNTER (OUTPATIENT)
Age: 86
Setting detail: SPECIMEN
Discharge: HOME | End: 2021-02-12
Payer: MEDICARE

## 2021-02-12 ENCOUNTER — PROCEDURE VISIT (OUTPATIENT)
Dept: PRIMARY CARE CLINIC | Age: 86
End: 2021-02-12
Payer: MEDICARE

## 2021-02-12 VITALS
BODY MASS INDEX: 26.05 KG/M2 | WEIGHT: 182 LBS | SYSTOLIC BLOOD PRESSURE: 124 MMHG | HEART RATE: 53 BPM | TEMPERATURE: 96.8 F | DIASTOLIC BLOOD PRESSURE: 62 MMHG | OXYGEN SATURATION: 97 % | HEIGHT: 70 IN

## 2021-02-12 DIAGNOSIS — D48.5 NEOPLASM OF UNCERTAIN BEHAVIOR OF SKIN: Primary | ICD-10-CM

## 2021-02-12 PROCEDURE — 11103 TANGNTL BX SKIN EA SEP/ADDL: CPT | Performed by: PHYSICIAN ASSISTANT

## 2021-02-12 PROCEDURE — 11102 TANGNTL BX SKIN SINGLE LES: CPT | Performed by: PHYSICIAN ASSISTANT

## 2021-02-12 NOTE — PROGRESS NOTES
Actinic Keratoses destruction procedure note:  2/12/2021  Albino Vaughan  1935    /62   Pulse 53   Temp 96.8 °F (36 °C)   Ht 5' 10\" (1.778 m)   Wt 182 lb (82.6 kg)   SpO2 97%   BMI 26.11 kg/m²   ALL VITALS REVIEWED    Allergies   Allergen Reactions    Sulfa Antibiotics Anaphylaxis       Chief Complaint   Patient presents with    Other     check previous bx spot on forhead. Possible cryotherapy     Written consent was obtained. Lesion(s) cleansed with Alcohol. Location:  ***    Histofreeze applied with applicator. Numberlesions treated: {Numbers; [id number]:[id number]}    Physical Exam    Patient tolerated procedure well. {No diagnosis found. (Refresh or delete this SmartLink)}    Follow Up: Wound care discussed. Keep well moisturized. Watch for signs of infection which would include:  Redness, swelling, fever. If signs appear, please return to office. No follow-ups on file.     Electronically signed by Ronaldo Delaney PA-C on 2/12/2021 at 10:01 AM

## 2021-02-12 NOTE — PROGRESS NOTES
Skin Biopsy Procedure Note  2/12/2021  Tia Covert  1935    /62   Pulse 53   Temp 96.8 °F (36 °C)   Ht 5' 10\" (1.778 m)   Wt 182 lb (82.6 kg)   SpO2 97%   BMI 26.11 kg/m²   VITALS REVIEWED    Allergies   Allergen Reactions    Sulfa Antibiotics Anaphylaxis       Chief Complaint   Patient presents with    Other     check previous bx spot on forhead. Possible cryotherapy       Lesion:  · 1. Right lower cheek  · 2. Right mid cheek  · 3. Right forehead  · 4. Left temple      Indication for Biopsy 1: non healing lesion  Indication for Biopsy 2: non healing lesion  Indication for Biopsy 3:  non healing lesion  Indication for Biopsy 4: non healing lesion    Procedure: The lesion(s) were cleansed with Alcohol.  2% lidocaine with epinephrine was used for local anaesthesia. 1. A 10 mm  shave was used to obtain a specimen. 2. A 9 mm shave was used. 3. A 1 cm shave was used 4. An 8 mm have was used. The specimen(s) were    preserved and sent for pathological examination. The biopsy site(s) were  cleansed and hemostasis using ACl and  pressure dressing(s) were achieved with good results. The patient was instructed on wound care. No complications occurred and the patient tolerated the procedure well. Going to wait on the Efudex until biopsy sites are healed. Diagnosis Orders   1. Neoplasm of uncertain behavior of skin  Derm biopsy    Derm biopsy    Derm biopsy    Derm biopsy       Follow Up: Wound care discussed. Keep well moisturized. Watch for signs of infection which would include:  Redness, swelling, fever. If signs appear, please return to office. Return in about 2 weeks (around 2/26/2021) for recheck with Darrel Nolasco, Will call with results.        Electronically signed by Deepali Qiu PA-C on 2/12/2021 at 10:30 AM

## 2021-02-15 DIAGNOSIS — I25.10 CORONARY ARTERY DISEASE INVOLVING NATIVE CORONARY ARTERY OF NATIVE HEART WITHOUT ANGINA PECTORIS: ICD-10-CM

## 2021-02-15 DIAGNOSIS — D48.5 NEOPLASM OF UNCERTAIN BEHAVIOR OF SKIN: ICD-10-CM

## 2021-02-15 DIAGNOSIS — I48.0 PAROXYSMAL ATRIAL FIBRILLATION (HCC): ICD-10-CM

## 2021-02-15 DIAGNOSIS — E11.9 TYPE 2 DIABETES MELLITUS WITHOUT COMPLICATION, WITH LONG-TERM CURRENT USE OF INSULIN (HCC): ICD-10-CM

## 2021-02-15 DIAGNOSIS — Z79.4 TYPE 2 DIABETES MELLITUS WITHOUT COMPLICATION, WITH LONG-TERM CURRENT USE OF INSULIN (HCC): ICD-10-CM

## 2021-02-16 LAB — DERMATOLOGY PATHOLOGY REPORT: NORMAL

## 2021-02-26 ENCOUNTER — OFFICE VISIT (OUTPATIENT)
Dept: PRIMARY CARE CLINIC | Age: 86
End: 2021-02-26
Payer: MEDICARE

## 2021-02-26 VITALS
WEIGHT: 184 LBS | BODY MASS INDEX: 26.34 KG/M2 | HEART RATE: 53 BPM | HEIGHT: 70 IN | SYSTOLIC BLOOD PRESSURE: 126 MMHG | DIASTOLIC BLOOD PRESSURE: 62 MMHG | TEMPERATURE: 97.2 F | OXYGEN SATURATION: 98 %

## 2021-02-26 DIAGNOSIS — L57.0 AK (ACTINIC KERATOSIS): Primary | ICD-10-CM

## 2021-02-26 PROCEDURE — 17004 DESTROY PREMAL LESIONS 15/>: CPT | Performed by: PHYSICIAN ASSISTANT

## 2021-02-26 NOTE — PROGRESS NOTES
Actinic Keratoses destruction procedure note:  2/26/2021  Juan Ortiz  1935    /62   Pulse 53   Temp 97.2 °F (36.2 °C)   Ht 5' 10\" (1.778 m)   Wt 184 lb (83.5 kg)   SpO2 98%   BMI 26.40 kg/m²   ALL VITALS REVIEWED    Allergies   Allergen Reactions    Sulfa Antibiotics Anaphylaxis       Chief Complaint   Patient presents with    2 Week Follow-Up     f/u from biopsy     Written consent was obtained. Lesion(s) cleansed with Alcohol. Location:  Forehead and scalp    Histofreeze applied with applicator. 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 Patient tolerated procedure well. Diagnosis Orders   1. AK (actinic keratosis)         Follow Up: Wound care discussed. Keep well moisturized. Watch for signs of infection which would include:  Redness, swelling, fever. If signs appear, please return to office. No follow-ups on file.     Electronically signed by Elena Patel PA-C on 2/26/2021 at 12:12 PM

## 2021-03-05 DIAGNOSIS — F41.9 ANXIETY: ICD-10-CM

## 2021-03-05 RX ORDER — LORAZEPAM 0.5 MG/1
TABLET ORAL
Qty: 60 TABLET | Refills: 0 | Status: SHIPPED | OUTPATIENT
Start: 2021-03-05 | End: 2021-05-07

## 2021-03-24 RX ORDER — RIVAROXABAN 20 MG/1
TABLET, FILM COATED ORAL
Qty: 90 TABLET | Refills: 3 | Status: SHIPPED | OUTPATIENT
Start: 2021-03-24 | End: 2022-04-04

## 2021-03-25 RX ORDER — ATORVASTATIN CALCIUM 80 MG/1
80 TABLET, FILM COATED ORAL NIGHTLY
Qty: 90 TABLET | Refills: 3 | Status: SHIPPED | OUTPATIENT
Start: 2021-03-25 | End: 2022-03-18

## 2021-03-26 ENCOUNTER — PROCEDURE VISIT (OUTPATIENT)
Dept: PRIMARY CARE CLINIC | Age: 86
End: 2021-03-26
Payer: MEDICARE

## 2021-03-26 ENCOUNTER — TELEPHONE (OUTPATIENT)
Dept: PRIMARY CARE CLINIC | Age: 86
End: 2021-03-26

## 2021-03-26 VITALS
BODY MASS INDEX: 26.77 KG/M2 | OXYGEN SATURATION: 98 % | HEIGHT: 70 IN | DIASTOLIC BLOOD PRESSURE: 62 MMHG | HEART RATE: 55 BPM | TEMPERATURE: 97.1 F | SYSTOLIC BLOOD PRESSURE: 126 MMHG | WEIGHT: 187 LBS

## 2021-03-26 DIAGNOSIS — L57.0 AK (ACTINIC KERATOSIS): Primary | ICD-10-CM

## 2021-03-26 PROCEDURE — 17004 DESTROY PREMAL LESIONS 15/>: CPT | Performed by: PHYSICIAN ASSISTANT

## 2021-03-26 NOTE — PROGRESS NOTES
Actinic Keratoses destruction procedure note:  3/26/2021  Summer Angulo  1935    /62   Pulse 55   Temp 97.1 °F (36.2 °C)   Ht 5' 10\" (1.778 m)   Wt 187 lb (84.8 kg)   SpO2 98%   BMI 26.83 kg/m²   ALL VITALS REVIEWED    Allergies   Allergen Reactions    Sulfa Antibiotics Anaphylaxis       Chief Complaint   Patient presents with    1 Month Follow-Up     4 week f/u for AK tx on left side of face      Written consent was obtained. Lesion(s) cleansed with Alcohol. Location:  face    Histofreeze applied with applicator. Numberlesions treated: 15    Physical Exam  Skin:     Comments: 4 of the 4 lesions that had SCC on path and several other all on forehead, cheeks and nose         Patient tolerated procedure well. Diagnosis Orders   1. AK (actinic keratosis)  DC DESTRUC PREMALIGNANT, FIRST LESION    DC DESTRUC PREMALIGNANT,2-14 LESIONS       Follow Up: Wound care discussed. Keep well moisturized. Watch for signs of infection which would include:  Redness, swelling, fever. If signs appear, please return to office. Return in about 3 weeks (around 4/16/2021).     Electronically signed by Neo Romeo PA-C on 3/26/2021 at 2:19 PM

## 2021-03-31 ENCOUNTER — TELEPHONE (OUTPATIENT)
Dept: PRIMARY CARE CLINIC | Age: 86
End: 2021-03-31

## 2021-04-02 DIAGNOSIS — Z79.4 TYPE 2 DIABETES MELLITUS WITHOUT COMPLICATION, WITH LONG-TERM CURRENT USE OF INSULIN (HCC): ICD-10-CM

## 2021-04-02 DIAGNOSIS — E11.9 TYPE 2 DIABETES MELLITUS WITHOUT COMPLICATION, WITH LONG-TERM CURRENT USE OF INSULIN (HCC): ICD-10-CM

## 2021-04-02 RX ORDER — BLOOD SUGAR DIAGNOSTIC
STRIP MISCELLANEOUS
Qty: 150 STRIP | Refills: 1 | Status: SHIPPED | OUTPATIENT
Start: 2021-04-02 | End: 2021-11-11

## 2021-04-16 ENCOUNTER — PROCEDURE VISIT (OUTPATIENT)
Dept: PRIMARY CARE CLINIC | Age: 86
End: 2021-04-16

## 2021-04-16 VITALS
OXYGEN SATURATION: 98 % | HEART RATE: 57 BPM | DIASTOLIC BLOOD PRESSURE: 78 MMHG | HEIGHT: 70 IN | SYSTOLIC BLOOD PRESSURE: 126 MMHG | WEIGHT: 187 LBS | BODY MASS INDEX: 26.77 KG/M2

## 2021-04-16 DIAGNOSIS — L57.0 AK (ACTINIC KERATOSIS): Primary | ICD-10-CM

## 2021-04-16 PROCEDURE — 99024 POSTOP FOLLOW-UP VISIT: CPT | Performed by: PHYSICIAN ASSISTANT

## 2021-04-22 ENCOUNTER — OFFICE VISIT (OUTPATIENT)
Dept: PRIMARY CARE CLINIC | Age: 86
End: 2021-04-22
Payer: MEDICARE

## 2021-04-22 VITALS
SYSTOLIC BLOOD PRESSURE: 146 MMHG | OXYGEN SATURATION: 98 % | BODY MASS INDEX: 26.75 KG/M2 | DIASTOLIC BLOOD PRESSURE: 66 MMHG | WEIGHT: 186.4 LBS | HEART RATE: 52 BPM

## 2021-04-22 DIAGNOSIS — E11.9 TYPE 2 DIABETES MELLITUS WITHOUT COMPLICATION, WITH LONG-TERM CURRENT USE OF INSULIN (HCC): Primary | ICD-10-CM

## 2021-04-22 DIAGNOSIS — M54.16 RADICULOPATHY OF LUMBAR REGION: ICD-10-CM

## 2021-04-22 DIAGNOSIS — Z79.4 TYPE 2 DIABETES MELLITUS WITHOUT COMPLICATION, WITH LONG-TERM CURRENT USE OF INSULIN (HCC): Primary | ICD-10-CM

## 2021-04-22 DIAGNOSIS — I10 ESSENTIAL HYPERTENSION: ICD-10-CM

## 2021-04-22 LAB — HBA1C MFR BLD: 7.7 %

## 2021-04-22 PROCEDURE — 4040F PNEUMOC VAC/ADMIN/RCVD: CPT | Performed by: FAMILY MEDICINE

## 2021-04-22 PROCEDURE — 1123F ACP DISCUSS/DSCN MKR DOCD: CPT | Performed by: FAMILY MEDICINE

## 2021-04-22 PROCEDURE — 99214 OFFICE O/P EST MOD 30 MIN: CPT | Performed by: FAMILY MEDICINE

## 2021-04-22 PROCEDURE — 83036 HEMOGLOBIN GLYCOSYLATED A1C: CPT | Performed by: FAMILY MEDICINE

## 2021-04-22 PROCEDURE — G8427 DOCREV CUR MEDS BY ELIG CLIN: HCPCS | Performed by: FAMILY MEDICINE

## 2021-04-22 PROCEDURE — 1036F TOBACCO NON-USER: CPT | Performed by: FAMILY MEDICINE

## 2021-04-22 PROCEDURE — G8417 CALC BMI ABV UP PARAM F/U: HCPCS | Performed by: FAMILY MEDICINE

## 2021-04-22 PROCEDURE — 3051F HG A1C>EQUAL 7.0%<8.0%: CPT | Performed by: FAMILY MEDICINE

## 2021-04-22 ASSESSMENT — ENCOUNTER SYMPTOMS
ABDOMINAL PAIN: 1
NAUSEA: 0
BACK PAIN: 1
SHORTNESS OF BREATH: 1
SINUS PRESSURE: 1
SORE THROAT: 0
COUGH: 0
WHEEZING: 0
VOMITING: 0

## 2021-04-22 ASSESSMENT — PATIENT HEALTH QUESTIONNAIRE - PHQ9
SUM OF ALL RESPONSES TO PHQ QUESTIONS 1-9: 0
2. FEELING DOWN, DEPRESSED OR HOPELESS: 0
SUM OF ALL RESPONSES TO PHQ9 QUESTIONS 1 & 2: 0
SUM OF ALL RESPONSES TO PHQ QUESTIONS 1-9: 0

## 2021-04-22 NOTE — PROGRESS NOTES
[facility] PRIMARY CARE  [address] [address]  [facility] [address]  Dept: [phone]    Dasha Dawkins is a 80 y.o. male Established patient, who presents today for his medical conditions/complaints as noted below. Chief Complaint   Patient presents with    Hypertension     Patient checks B/P at times    Diabetes     Patient checks blood sugars daily     Medication Check       HPI:     HPI Pt states he takes his blood sugars daily in th morning before breakfast and before dinner. Blood sugars have been between 130-135. States he has noticed an increased in his average sugar after a recent change in his mediations. Denies any side effects with medications. Stomach issues better since decrease in metformin. Pt takes blood pressures once a month and is \"usually about 130/50-60\". States he gets occasional chest pain and shortness of breath with exertion since 2019. CP and SOB is relieved with rest. States his cardiologist is aware. States he has spinal stenosis and has been lower back pain that radiates down his right leg for the past month. Pain in lower back is dull and pain radiation down leg is \"shooting and sharp\". Denies taking any medication to relieve pain. Pain is increased with bending over and walking. Does not want any prescription for pain. Does not want to go back o PM for injection again.             Reviewed prior notes None  Reviewed previous Labs    LDL Cholesterol (mg/dL)   Date Value   01/26/2018 39   10/05/2015 51     LDL Calculated (mg/dL)   Date Value   02/08/2021 51   03/06/2018 54   10/18/2016 70       (goal LDL is <100)   AST (U/L)   Date Value   03/12/2020 23     ALT (U/L)   Date Value   03/12/2020 17     BUN (mg/dL)   Date Value   03/12/2020 26 (H)     Hemoglobin A1C (%)   Date Value   04/22/2021 7.7     TSH (mIU/L)   Date Value   10/04/2015 2.99     BP Readings from Last 3 Encounters:   04/22/21 (!) 150/66   04/16/21 126/78 03/26/21 126/62          (goal 120/80)    Past Medical History:   Diagnosis Date    Acute myocardial ischemia ([facility] 12/11/2015    Acute sinusitis 12/11/2015    Cerebral artery occlusion with cerebral infarction [facility])     Chest pain 10/5/2015    Diabetes mellitus ([facility]     H/O cataract     Hyperlipidemia     Hypertension     MI (myocardial infarction) ([facility] 1985    Neck stiffness 12/11/2015    [facility] 11/7/2019    Peyronie's disease     Rotator cuff tendonitis 12/11/2015    TIA (transient ischemic attack) 1998      Past Surgical History:   Procedure Laterality Date    CARDIAC CATHETERIZATION      mulitple caths- 5 stents total    CARDIAC SURGERY  01/2018    3 stents    CATARACT REMOVAL WITH IMPLANT Left     SKIN BIOPSY      forehead       Family History   Problem Relation Age of Onset    Cancer Mother         oral    Stroke Father     High Blood Pressure Brother     Heart Disease Brother     Cancer Maternal Grandmother     Diabetes type 2  Son     Heart Attack Son     Heart Disease Maternal Grandfather     Stroke Paternal Grandmother     Heart Disease Paternal Grandfather         ?        Social History     Tobacco Use    Smoking status: Former Smoker     Packs/day: 0.50     Years: 8.00     Pack years: 4.00    Smokeless tobacco: Never Used   Substance Use Topics    Alcohol use: No      Current Outpatient Medications   Medication Sig Dispense Refill    FREESTYLE TEST STRIPS strip USE TO TEST BLOOD SUGAR TWO TIMES A DAY AND AS NEEDED 150 strip 1    atorvastatin (LIPITOR) 80 MG tablet Take 1 tablet by mouth nightly 90 tablet 3    XARELTO 20 MG TABS tablet TAKE 1 TABLET DAILY 90 tablet 3    metFORMIN (GLUCOPHAGE-XR) 500 MG extended release tablet Take 2 tablets by mouth daily (with breakfast) 180 tablet 1    lisinopril-hydroCHLOROthiazide (PRINZIDE;ZESTORETIC) 20-12.5 MG per tablet TAKE 1 TABLET DAILY 90 tablet 3    amLODIPine (NORVASC) 5 MG tablet TAKE 1 TABLET DAILY 90 tablet 3    metoprolol succinate (TOPROL XL) 25 MG extended release tablet TAKE 1 TABLET DAILY 90 tablet 3    insulin glargine (LANTUS SOLOSTAR) 100 UNIT/ML injection pen Inject 12 Units into the skin nightly 15 mL 3    Accu-Chek FastClix Lancets MIS USE ONE LANCET TO TEST TWICE A DAY AND AS NEEDED 200 each 3    acetaminophen (TYLENOL) 500 MG tablet Take 500 mg by mouth every 6 hours as needed for Pain      Insulin Pen Needle (ULTICARE SHORT PEN NEEDLES) 31G X 8 MM MISC Inject 1 each into the skin daily 100 each 3    clopidogrel (PLAVIX) 75 MG tablet Take 1 tablet by mouth daily 90 tablet 3    Melatonin 5 MG CAPS Take 5 mg by mouth nightly 30 capsule 0    fluticasone (FLONASE) 50 MCG/ACT nasal spray 1 spray by Each Nare route daily 1 Bottle 3    nitroGLYCERIN (NITROSTAT) 0.4 MG SL tablet Place 1 tablet under the tongue every 5 minutes as needed for Chest pain 25 tablet 1    Coenzyme Q10 (CO Q-10) 30 MG CAPS Take 30 mg by mouth daily      Ascorbic Acid (VITAMIN C) 250 MG tablet Take 250 mg by mouth daily      ferrous sulfate 325 (65 FE) MG tablet Take 325 mg by mouth daily       Lancet Device MISC 1 Device by Does not apply route once for 1 dose 1 Device 0     No current facility-administered medications for this visit. Allergies   Allergen Reactions    Sulfa Antibiotics Anaphylaxis       Health Maintenance   Topic Date Due    DTaP/Tdap/Td vaccine (1 - Tdap) Never done    Shingles Vaccine (1 of 2) Never done   [employer] Visit (AWV)  07/18/2021    Lipid screen  02/08/2022    Potassium monitoring  02/08/2022    Creatinine monitoring  02/08/2022    Flu vaccine  Completed    Pneumococcal 65+ years Vaccine  Completed    COVID-19 Vaccine  Completed    Hepatitis A vaccine  Aged Out    Hib vaccine  Aged Out    Meningococcal (ACWY) vaccine  Aged Out       Subjective:      Review of Systems   Constitutional: Negative for chills and fever.    HENT: Positive for sinus pressure. Negative for congestion and sore throat. Respiratory: Positive for shortness of breath (with exertion, relieved with rest). Negative for cough and wheezing. Cardiovascular: Positive for chest pain (with exertion, relieved with rest). Negative for leg swelling. Gastrointestinal: Positive for abdominal pain (better after decreasing dose of metformin ). Negative for nausea and vomiting. Genitourinary: Negative for difficulty urinating and dysuria. Musculoskeletal: Positive for back pain (right sided lumbar region). Skin: Negative for rash. Neurological: Positive for numbness (toes from neuropathy). Negative for dizziness, light-headedness and headaches. Psychiatric/Behavioral: Negative for sleep disturbance. Objective:     BP (!) 150/66   Pulse 52   Wt 186 lb 6.4 oz (84.6 kg)   SpO2 98%   BMI 26.75 kg/m²   Physical Exam  Constitutional:       General: He is not in acute distress. Appearance: Normal appearance. He is not toxic-appearing. HENT:      Head: Normocephalic and atraumatic. Nose: Nose normal.      Mouth/Throat:      Mouth: Mucous membranes are moist.      Pharynx: Oropharynx is clear. Eyes:      General: No scleral icterus. Right eye: No discharge. Left eye: No discharge. Conjunctiva/sclera: Conjunctivae normal.      Pupils: Pupils are equal, round, and reactive to light. Neck:      Musculoskeletal: No muscular tenderness. Cardiovascular:      Rate and Rhythm: Normal rate and regular rhythm. Heart sounds: Normal heart sounds. No murmur. Pulmonary:      Effort: Pulmonary effort is normal. No respiratory distress. Breath sounds: Normal breath sounds. No wheezing. Musculoskeletal:         General: No tenderness. Right lower leg: No edema. Left lower leg: No edema. Comments: + straight leg raise   Lymphadenopathy:      Cervical: No cervical adenopathy. Skin:     General: Skin is warm and dry.    Neurological: Mental Status: He is alert and oriented to person, place, and time. Deep Tendon Reflexes: Reflexes normal.   Psychiatric:         Mood and Affect: Mood normal.         Behavior: Behavior normal.         Assessment:       Diagnosis Orders   1. Type 2 diabetes mellitus without complication, with long-term current use of insulin (HCC)  POCT glycosylated hemoglobin (Hb A1C)   2. Essential hypertension     3. Radiculopathy of lumbar region          Plan:    Monitor BP daily. Xray of lumbar region and referral back to PM if pt would like. Do exercises for radiculopathy     Will leave HbA1c higher due to hypoglycemia episodes as well as side effects from increased metformin. Return in about 3 months (around 7/22/2021) for DM check, HTN f/u. Orders Placed This Encounter   Procedures    POCT glycosylated hemoglobin (Hb A1C)     No orders of the defined types were placed in this encounter. Patient given educational materials - see patient instructions. Discussed use, benefit, and side effects of prescribed medications. All patient questions answered. Pt voiced understanding. Reviewed health maintenance. Instructed to continue current medications, diet and exercise. Patient agreed with treatment plan. Follow up as directed.      Electronically signed by Valentina Lawrence MD on 4/22/2021 at 10:23 AM

## 2021-04-23 DIAGNOSIS — E11.9 TYPE 2 DIABETES MELLITUS WITHOUT COMPLICATION, WITH LONG-TERM CURRENT USE OF INSULIN (HCC): ICD-10-CM

## 2021-04-23 DIAGNOSIS — Z79.4 TYPE 2 DIABETES MELLITUS WITHOUT COMPLICATION, WITH LONG-TERM CURRENT USE OF INSULIN (HCC): ICD-10-CM

## 2021-04-23 RX ORDER — INSULIN GLARGINE 100 [IU]/ML
INJECTION, SOLUTION SUBCUTANEOUS
Qty: 5 PEN | Refills: 2 | Status: SHIPPED | OUTPATIENT
Start: 2021-04-23 | End: 2021-08-05 | Stop reason: SDUPTHER

## 2021-04-27 ENCOUNTER — TELEPHONE (OUTPATIENT)
Dept: PRIMARY CARE CLINIC | Age: 86
End: 2021-04-27

## 2021-05-07 DIAGNOSIS — F41.9 ANXIETY: ICD-10-CM

## 2021-05-07 RX ORDER — LORAZEPAM 0.5 MG/1
TABLET ORAL
Qty: 60 TABLET | Refills: 0 | Status: SHIPPED | OUTPATIENT
Start: 2021-05-07 | End: 2021-07-06

## 2021-06-02 RX ORDER — CLOPIDOGREL BISULFATE 75 MG/1
TABLET ORAL
Qty: 90 TABLET | Refills: 3 | Status: SHIPPED | OUTPATIENT
Start: 2021-06-02 | End: 2022-06-06

## 2021-07-05 DIAGNOSIS — F41.9 ANXIETY: ICD-10-CM

## 2021-07-06 RX ORDER — LORAZEPAM 0.5 MG/1
TABLET ORAL
Qty: 60 TABLET | Refills: 0 | Status: SHIPPED | OUTPATIENT
Start: 2021-07-06 | End: 2021-09-14

## 2021-07-19 RX ORDER — METFORMIN HYDROCHLORIDE 500 MG/1
TABLET, EXTENDED RELEASE ORAL
Qty: 180 TABLET | Refills: 3 | Status: SHIPPED | OUTPATIENT
Start: 2021-07-19 | End: 2022-02-21 | Stop reason: SDUPTHER

## 2021-08-05 DIAGNOSIS — E11.9 TYPE 2 DIABETES MELLITUS WITHOUT COMPLICATION, WITH LONG-TERM CURRENT USE OF INSULIN (HCC): ICD-10-CM

## 2021-08-05 DIAGNOSIS — Z79.4 TYPE 2 DIABETES MELLITUS WITHOUT COMPLICATION, WITH LONG-TERM CURRENT USE OF INSULIN (HCC): ICD-10-CM

## 2021-08-06 RX ORDER — INSULIN GLARGINE 100 [IU]/ML
15 INJECTION, SOLUTION SUBCUTANEOUS NIGHTLY
Qty: 5 PEN | Refills: 2 | Status: SHIPPED | OUTPATIENT
Start: 2021-08-06 | End: 2022-04-05

## 2021-08-20 ENCOUNTER — OFFICE VISIT (OUTPATIENT)
Dept: PRIMARY CARE CLINIC | Age: 86
End: 2021-08-20
Payer: MEDICARE

## 2021-08-20 VITALS
WEIGHT: 183.2 LBS | OXYGEN SATURATION: 98 % | HEART RATE: 50 BPM | DIASTOLIC BLOOD PRESSURE: 64 MMHG | BODY MASS INDEX: 26.23 KG/M2 | HEIGHT: 70 IN | SYSTOLIC BLOOD PRESSURE: 112 MMHG

## 2021-08-20 DIAGNOSIS — E11.9 TYPE 2 DIABETES MELLITUS WITHOUT COMPLICATION, WITH LONG-TERM CURRENT USE OF INSULIN (HCC): ICD-10-CM

## 2021-08-20 DIAGNOSIS — Z79.4 TYPE 2 DIABETES MELLITUS WITHOUT COMPLICATION, WITH LONG-TERM CURRENT USE OF INSULIN (HCC): ICD-10-CM

## 2021-08-20 LAB — HBA1C MFR BLD: 7.4 %

## 2021-08-20 PROCEDURE — 1123F ACP DISCUSS/DSCN MKR DOCD: CPT | Performed by: FAMILY MEDICINE

## 2021-08-20 PROCEDURE — G8417 CALC BMI ABV UP PARAM F/U: HCPCS | Performed by: FAMILY MEDICINE

## 2021-08-20 PROCEDURE — 3051F HG A1C>EQUAL 7.0%<8.0%: CPT | Performed by: FAMILY MEDICINE

## 2021-08-20 PROCEDURE — 1036F TOBACCO NON-USER: CPT | Performed by: FAMILY MEDICINE

## 2021-08-20 PROCEDURE — G8427 DOCREV CUR MEDS BY ELIG CLIN: HCPCS | Performed by: FAMILY MEDICINE

## 2021-08-20 PROCEDURE — 99213 OFFICE O/P EST LOW 20 MIN: CPT | Performed by: FAMILY MEDICINE

## 2021-08-20 PROCEDURE — 4040F PNEUMOC VAC/ADMIN/RCVD: CPT | Performed by: FAMILY MEDICINE

## 2021-08-20 PROCEDURE — 83036 HEMOGLOBIN GLYCOSYLATED A1C: CPT | Performed by: FAMILY MEDICINE

## 2021-08-20 RX ORDER — METOPROLOL SUCCINATE 25 MG/1
25 TABLET, EXTENDED RELEASE ORAL DAILY
Qty: 15 TABLET | Refills: 3
Start: 2021-08-20 | End: 2022-05-07 | Stop reason: SDUPTHER

## 2021-08-20 ASSESSMENT — ENCOUNTER SYMPTOMS
EYE DISCHARGE: 0
DIARRHEA: 0
COUGH: 0
VOMITING: 0
EYE REDNESS: 0
RHINORRHEA: 0
WHEEZING: 0
ABDOMINAL PAIN: 0
SORE THROAT: 0
NAUSEA: 0
SHORTNESS OF BREATH: 1

## 2021-08-20 NOTE — PROGRESS NOTES
[address] [facility] PRIMARY CARE  [address] [facility] [address]  Dept: [phone]    Minesh Flores is a 80 y.o. male Established patient, who presents today for his medical conditions/complaints as noted below  Chief Complaint   Patient presents with    Follow-up     DM       HPI:     HPI  Had lows x2, but had not eaten right. Has not been feeling very well. Saw cardiology a couple weeks ago and HR was low so he decreased his metoprolol. Has heart monitor scheduled for next week. No CP. Not really noticing any improvement- HR is still 50 here. Has been more SOB with exertion.     Reviewed prior notes Cardiology  Reviewed previous Labs and Imaging    Hemoglobin A1C (%)   Date Value   08/20/2021 7.4   04/22/2021 7.7   01/21/2021 6.7             ( goal A1C is < 7)   No results found for: LABMICR  LDL Cholesterol (mg/dL)   Date Value   01/26/2018 39   10/05/2015 51     LDL Calculated (mg/dL)   Date Value   02/08/2021 51   03/06/2018 54   10/18/2016 70       (goal LDL is <100)   AST (U/L)   Date Value   03/12/2020 23     ALT (U/L)   Date Value   03/12/2020 17     BUN (mg/dL)   Date Value   03/12/2020 26 (H)     BP Readings from Last 3 Encounters:   08/20/21 112/64   04/22/21 (!) 146/66   04/16/21 126/78          (goal 140/90)    Past Medical History:   Diagnosis Date    Acute myocardial ischemia ([address] 12/11/2015    Acute sinusitis 12/11/2015    Cerebral artery occlusion with cerebral infarction [facility])     Chest pain 10/5/2015    Diabetes mellitus ([address]     H/O cataract     Hyperlipidemia     Hypertension     MI (myocardial infarction) ([address] 1985    Neck stiffness 12/11/2015    Partial small bowel obstruction ([address] 11/7/2019    Peyronie's disease     Rotator cuff tendonitis 12/11/2015    TIA (transient ischemic attack) 1998        Social History     Tobacco Use    Smoking status: Former Smoker     Packs/day: 0.50     Years: 8.00     Pack years: 4.00    Smokeless tobacco: Never Used   Substance Use Topics    Alcohol use: No      Current Outpatient Medications   Medication Sig Dispense Refill    metoprolol succinate (TOPROL XL) 25 MG extended release tablet Take 1 tablet by mouth daily 1/2 tab qd 15 tablet 3    insulin glargine (LANTUS SOLOSTAR) 100 UNIT/ML injection pen Inject 15 Units into the skin nightly 5 pen 2    metFORMIN (GLUCOPHAGE-XR) 500 MG extended release tablet TAKE 2 TABLETS DAILY WITH BREAKFAST 180 tablet 3    clopidogrel (PLAVIX) 75 MG tablet TAKE 1 TABLET DAILY 90 tablet 3    ULTICARE SHORT PEN NEEDLES 31G X 8 MM MISC INJECT 1 NEEDLE INTO THE SKIN DAILY 100 each 3    FREESTYLE TEST STRIPS strip USE TO TEST BLOOD SUGAR TWO TIMES A DAY AND AS NEEDED 150 strip 1    atorvastatin (LIPITOR) 80 MG tablet Take 1 tablet by mouth nightly 90 tablet 3    XARELTO 20 MG TABS tablet TAKE 1 TABLET DAILY 90 tablet 3    lisinopril-hydroCHLOROthiazide (PRINZIDE;ZESTORETIC) 20-12.5 MG per tablet TAKE 1 TABLET DAILY 90 tablet 3    amLODIPine (NORVASC) 5 MG tablet TAKE 1 TABLET DAILY 90 tablet 3    Accu-Chek FastClix Lancets [facility] USE ONE LANCET TO TEST TWICE A DAY AND AS NEEDED 200 each 3    acetaminophen (TYLENOL) 500 MG tablet Take 500 mg by mouth every 6 hours as needed for Pain      Melatonin 5 MG CAPS Take 5 mg by mouth nightly 30 capsule 0    fluticasone (FLONASE) 50 MCG/ACT nasal spray 1 spray by Each Nare route daily 1 Bottle 3    nitroGLYCERIN (NITROSTAT) 0.4 MG SL tablet Place 1 tablet under the tongue every 5 minutes as needed for Chest pain 25 tablet 1    Coenzyme Q10 (CO Q-10) 30 MG CAPS Take 30 mg by mouth daily      Ascorbic Acid (VITAMIN C) 250 MG tablet Take 250 mg by mouth daily      ferrous sulfate 325 (65 FE) MG tablet Take 325 mg by mouth daily       Lancet Device MISC 1 Device by Does not apply route once for 1 dose 1 Device 0     No current facility-administered medications for this visit.      Allergies   Allergen Reactions    Sulfa Antibiotics Anaphylaxis       Health Maintenance   Topic Date Due    DTaP/Tdap/Td vaccine (1 - Tdap) Never done    Shingles Vaccine (1 of 2) Never done   [employer] Visit (AWV)  Never done    Flu vaccine (1) 09/01/2021    Lipid screen  02/08/2022    Potassium monitoring  02/08/2022    Creatinine monitoring  02/08/2022    Pneumococcal 65+ years Vaccine  Completed    COVID-19 Vaccine  Completed    Hepatitis A vaccine  Aged Out    Hib vaccine  Aged Out    Meningococcal (ACWY) vaccine  Aged Out       Subjective:     Review of Systems   Constitutional: Negative for chills and fever. HENT: Negative for rhinorrhea and sore throat. Eyes: Negative for discharge and redness. Respiratory: Positive for shortness of breath (with exertion). Negative for cough and wheezing. Cardiovascular: Negative for chest pain and palpitations. Gastrointestinal: Negative for abdominal pain, diarrhea, nausea and vomiting. Genitourinary: Negative for dysuria and frequency. Musculoskeletal: Negative for arthralgias and myalgias. Neurological: Positive for light-headedness (occas, if stands up.  ). Negative for headaches. Psychiatric/Behavioral: Positive for sleep disturbance. Objective:     /64   Pulse 50   Ht 5' 10\" (1.778 m)   Wt 183 lb 3.2 oz (83.1 kg)   SpO2 98%   BMI 26.29 kg/m²   Physical Exam  Vitals and nursing note reviewed. Constitutional:       General: He is not in acute distress. Appearance: He is well-developed. He is not ill-appearing. HENT:      Head: Normocephalic and atraumatic. Right Ear: External ear normal.      Left Ear: External ear normal.   Eyes:      General: No scleral icterus. Right eye: No discharge. Left eye: No discharge. Conjunctiva/sclera: Conjunctivae normal.      Pupils: Pupils are equal, round, and reactive to light. Neck:      Thyroid: No thyromegaly. Trachea: No tracheal deviation.    Cardiovascular:      Rate and Rhythm: Normal rate and regular rhythm. Heart sounds: Normal heart sounds. Pulmonary:      Effort: Pulmonary effort is normal. No respiratory distress. Breath sounds: Normal breath sounds. No wheezing. Lymphadenopathy:      Cervical: No cervical adenopathy. Skin:     General: Skin is warm. Findings: No rash. Neurological:      Mental Status: He is alert and oriented to person, place, and time. Psychiatric:         Mood and Affect: Mood normal.         Behavior: Behavior normal.         Thought Content: Thought content normal.         Assessment/Plan:   1. Type 2 diabetes mellitus without complication, with long-term current use of insulin ([facility])  Assessment & Plan:   Well-controlled, continue current medications  Orders:  -     POCT glycosylated hemoglobin (Hb A1C)     No follow-ups on file. Orders Placed This Encounter   Procedures    POCT glycosylated hemoglobin (Hb A1C)     Orders Placed This Encounter   Medications    metoprolol succinate (TOPROL XL) 25 MG extended release tablet     Sig: Take 1 tablet by mouth daily 1/2 tab qd     Dispense:  15 tablet     Refill:  3       Patient given educational materials - see patient instructions. Discussed use, benefit, and side effects of prescribed medications. All patient questions answered. Pt voiced understanding. Reviewed health maintenance. Instructed to continue current medications, diet and exercise. Patient agreed with treatment plan. Follow up as directed.      Electronicallysigned by Janeth Hansen MD on 8/20/2021 at 11:13 AM

## 2021-09-12 DIAGNOSIS — F41.9 ANXIETY: ICD-10-CM

## 2021-09-14 RX ORDER — LORAZEPAM 0.5 MG/1
TABLET ORAL
Qty: 60 TABLET | Refills: 0 | Status: SHIPPED | OUTPATIENT
Start: 2021-09-14 | End: 2021-10-14

## 2021-10-18 ENCOUNTER — OFFICE VISIT (OUTPATIENT)
Dept: PRIMARY CARE CLINIC | Age: 86
End: 2021-10-18
Payer: MEDICARE

## 2021-10-18 VITALS
HEIGHT: 70 IN | SYSTOLIC BLOOD PRESSURE: 116 MMHG | HEART RATE: 56 BPM | OXYGEN SATURATION: 98 % | WEIGHT: 180 LBS | DIASTOLIC BLOOD PRESSURE: 68 MMHG | BODY MASS INDEX: 25.77 KG/M2

## 2021-10-18 DIAGNOSIS — Z23 NEED FOR VACCINATION: ICD-10-CM

## 2021-10-18 DIAGNOSIS — L57.0 AK (ACTINIC KERATOSIS): Primary | ICD-10-CM

## 2021-10-18 PROCEDURE — 90694 VACC AIIV4 NO PRSRV 0.5ML IM: CPT | Performed by: PHYSICIAN ASSISTANT

## 2021-10-18 PROCEDURE — 17000 DESTRUCT PREMALG LESION: CPT | Performed by: PHYSICIAN ASSISTANT

## 2021-10-18 PROCEDURE — G0008 ADMIN INFLUENZA VIRUS VAC: HCPCS | Performed by: PHYSICIAN ASSISTANT

## 2021-10-18 PROCEDURE — 17003 DESTRUCT PREMALG LES 2-14: CPT | Performed by: PHYSICIAN ASSISTANT

## 2021-10-18 NOTE — PROGRESS NOTES
Actinic Keratoses destruction procedure note:  10/18/2021  Jaswant Harms  1935    /68   Pulse 56   Ht 5' 10\" (1.778 m)   Wt 180 lb (81.6 kg)   SpO2 98%   BMI 25.83 kg/m²   ALL VITALS REVIEWED    Allergies   Allergen Reactions    Sulfa Antibiotics Anaphylaxis       Chief Complaint   Patient presents with    6 Month Follow-Up     ak recheck and treatment-on face     Written consent was obtained. Lesion(s) cleansed with Alcohol. Last treated 4/2021    Location:  face    Histofreeze applied with applicator. Numberlesions treated: 13    Physical Exam  See picture    Patient tolerated procedure well. Diagnosis Orders   1. AK (actinic keratosis)  MN DESTRUC PREMALIGNANT, FIRST LESION    MN DESTRUC PREMALIGNANT,2-14 LESIONS   2. Need for vaccination  INFLUENZA, QUADV, ADJUVANTED, 65 YRS =, IM, PF, PREFILL SYR, 0.5ML (FLUAD)       Follow Up: Wound care discussed. Keep well moisturized. Watch for signs of infection which would include:  Redness, swelling, fever. If signs appear, please return to office. Prefers to do this tx over Efudex  Return in about 6 months (around 4/18/2022) for AK treatment.     Electronically signed by Asia Adkins PA-C on 10/18/2021 at 10:59 AM

## 2021-11-10 DIAGNOSIS — F41.9 ANXIETY: Primary | ICD-10-CM

## 2021-11-10 DIAGNOSIS — Z79.4 TYPE 2 DIABETES MELLITUS WITHOUT COMPLICATION, WITH LONG-TERM CURRENT USE OF INSULIN (HCC): ICD-10-CM

## 2021-11-10 DIAGNOSIS — E11.9 TYPE 2 DIABETES MELLITUS WITHOUT COMPLICATION, WITH LONG-TERM CURRENT USE OF INSULIN (HCC): ICD-10-CM

## 2021-11-11 RX ORDER — LANCETS
EACH MISCELLANEOUS
Qty: 100 EACH | Refills: 1 | Status: SHIPPED | OUTPATIENT
Start: 2021-11-11 | End: 2022-06-20

## 2021-11-11 RX ORDER — LORAZEPAM 0.5 MG/1
TABLET ORAL
Qty: 60 TABLET | Refills: 0 | Status: SHIPPED | OUTPATIENT
Start: 2021-11-11 | End: 2021-12-11

## 2021-11-11 RX ORDER — BLOOD SUGAR DIAGNOSTIC
STRIP MISCELLANEOUS
Qty: 150 STRIP | Refills: 1 | Status: SHIPPED | OUTPATIENT
Start: 2021-11-11 | End: 2022-06-20

## 2021-11-22 ENCOUNTER — OFFICE VISIT (OUTPATIENT)
Dept: PRIMARY CARE CLINIC | Age: 86
End: 2021-11-22
Payer: MEDICARE

## 2021-11-22 VITALS
WEIGHT: 181.4 LBS | OXYGEN SATURATION: 100 % | HEART RATE: 58 BPM | SYSTOLIC BLOOD PRESSURE: 122 MMHG | BODY MASS INDEX: 26.03 KG/M2 | DIASTOLIC BLOOD PRESSURE: 74 MMHG

## 2021-11-22 DIAGNOSIS — I48.0 PAROXYSMAL ATRIAL FIBRILLATION (HCC): ICD-10-CM

## 2021-11-22 DIAGNOSIS — I10 ESSENTIAL HYPERTENSION: ICD-10-CM

## 2021-11-22 DIAGNOSIS — E11.9 TYPE 2 DIABETES MELLITUS WITHOUT COMPLICATION, WITH LONG-TERM CURRENT USE OF INSULIN (HCC): Primary | ICD-10-CM

## 2021-11-22 DIAGNOSIS — Z79.4 TYPE 2 DIABETES MELLITUS WITHOUT COMPLICATION, WITH LONG-TERM CURRENT USE OF INSULIN (HCC): Primary | ICD-10-CM

## 2021-11-22 LAB — HBA1C MFR BLD: 7.6 %

## 2021-11-22 PROCEDURE — 1036F TOBACCO NON-USER: CPT | Performed by: FAMILY MEDICINE

## 2021-11-22 PROCEDURE — 4040F PNEUMOC VAC/ADMIN/RCVD: CPT | Performed by: FAMILY MEDICINE

## 2021-11-22 PROCEDURE — G8417 CALC BMI ABV UP PARAM F/U: HCPCS | Performed by: FAMILY MEDICINE

## 2021-11-22 PROCEDURE — 99213 OFFICE O/P EST LOW 20 MIN: CPT | Performed by: FAMILY MEDICINE

## 2021-11-22 PROCEDURE — 83036 HEMOGLOBIN GLYCOSYLATED A1C: CPT | Performed by: FAMILY MEDICINE

## 2021-11-22 PROCEDURE — 1123F ACP DISCUSS/DSCN MKR DOCD: CPT | Performed by: FAMILY MEDICINE

## 2021-11-22 PROCEDURE — G8484 FLU IMMUNIZE NO ADMIN: HCPCS | Performed by: FAMILY MEDICINE

## 2021-11-22 PROCEDURE — 3051F HG A1C>EQUAL 7.0%<8.0%: CPT | Performed by: FAMILY MEDICINE

## 2021-11-22 PROCEDURE — G8427 DOCREV CUR MEDS BY ELIG CLIN: HCPCS | Performed by: FAMILY MEDICINE

## 2021-11-22 RX ORDER — BLOOD-GLUCOSE METER
1 KIT MISCELLANEOUS DAILY PRN
Qty: 1 KIT | Refills: 0 | Status: SHIPPED | OUTPATIENT
Start: 2021-11-22

## 2021-11-22 RX ORDER — BLOOD-GLUCOSE METER
1 KIT MISCELLANEOUS DAILY
Qty: 1 KIT | Refills: 0 | Status: CANCELLED | OUTPATIENT
Start: 2021-11-22

## 2021-11-22 ASSESSMENT — ENCOUNTER SYMPTOMS
VOMITING: 0
EYE REDNESS: 0
SHORTNESS OF BREATH: 1
RHINORRHEA: 0
WHEEZING: 0
DIARRHEA: 0
ABDOMINAL PAIN: 0
SORE THROAT: 0
EYE DISCHARGE: 0
COUGH: 0
NAUSEA: 0

## 2021-11-22 NOTE — PROGRESS NOTES
[address] [facility] PRIMARY CARE  [address] [place] [address]  Dept: [phone]    Gail Fernandez is a 80 y.o. male Established patient, who presents today for his medical conditions/complaints as noted below  Chief Complaint   Patient presents with    Diabetes     Patient checks blood sugar BID    Hypertension     Patient checks B/P at times    Medication Check       HPI:     HPI  Pt doing okay - only having occas lows. Having some issues with Afib- has cardio appt next week. Wore monitor for over a month. Having some palps. Nothing major.       Reviewed prior notes None  Reviewed previous Labs    Hemoglobin A1C (%)   Date Value   11/22/2021 7.6   08/20/2021 7.4   04/22/2021 7.7             ( goal A1C is < 7)   No results found for: LABMICR  LDL Cholesterol (mg/dL)   Date Value   01/26/2018 39   10/05/2015 51     LDL Calculated (mg/dL)   Date Value   02/08/2021 51   03/06/2018 54   10/18/2016 70       (goal LDL is <100)   AST (U/L)   Date Value   03/12/2020 23     ALT (U/L)   Date Value   03/12/2020 17     BUN (mg/dL)   Date Value   03/12/2020 26 (H)     BP Readings from Last 3 Encounters:   11/22/21 122/74   10/18/21 116/68   08/20/21 112/64          (goal 140/90)    Past Medical History:   Diagnosis Date    Acute myocardial ischemia ([address] 12/11/2015    Acute sinusitis 12/11/2015    Cerebral artery occlusion with cerebral infarction [facility])     Chest pain 10/5/2015    Diabetes mellitus ([address]     H/O cataract     Hyperlipidemia     Hypertension     MI (myocardial infarction) ([address] 1985    Neck stiffness 12/11/2015    Partial small bowel obstruction ([address] 11/7/2019    Peyronie's disease     Rotator cuff tendonitis 12/11/2015    TIA (transient ischemic attack) 1998        Social History     Tobacco Use    Smoking status: Former Smoker     Packs/day: 0.50     Years: 8.00     Pack years: 4.00    Smokeless tobacco: Never Used   Substance Use Topics  Alcohol use: No      Current Outpatient Medications   Medication Sig Dispense Refill    Blood Glucose Monitoring Suppl (FREESTYLE FREEDOM LITE) w/Device KIT 1 kit by Does not apply route daily as needed (check sugars bid and prn) 1 kit 0    blood glucose test strips (FREESTYLE TEST STRIPS) strip USE ONE NEW STRIP TO TEST BLOOD SUGAR TWICE A DAY AND AS NEEDED 150 strip 1    LORazepam (ATIVAN) 0.5 MG tablet TAKE ONE TABLET BY MOUTH EVERY 6 HOURS AS NEEDED FOR ANXIETY 60 tablet 0    Accu-Chek FastClix Lancets MISC USE ONE LANCET TO TEST TWICE A DAY AND AS NEEDED 100 each 1    metoprolol succinate (TOPROL XL) 25 MG extended release tablet Take 1 tablet by mouth daily 1/2 tab qd 15 tablet 3    insulin glargine (LANTUS SOLOSTAR) 100 UNIT/ML injection pen Inject 15 Units into the skin nightly 5 pen 2    metFORMIN (GLUCOPHAGE-XR) 500 MG extended release tablet TAKE 2 TABLETS DAILY WITH BREAKFAST 180 tablet 3    clopidogrel (PLAVIX) 75 MG tablet TAKE 1 TABLET DAILY 90 tablet 3    ULTICARE SHORT PEN NEEDLES 31G X 8 MM MISC INJECT 1 NEEDLE INTO THE SKIN DAILY 100 each 3    atorvastatin (LIPITOR) 80 MG tablet Take 1 tablet by mouth nightly 90 tablet 3    XARELTO 20 MG TABS tablet TAKE 1 TABLET DAILY 90 tablet 3    lisinopril-hydroCHLOROthiazide (PRINZIDE;ZESTORETIC) 20-12.5 MG per tablet TAKE 1 TABLET DAILY 90 tablet 3    amLODIPine (NORVASC) 5 MG tablet TAKE 1 TABLET DAILY 90 tablet 3    acetaminophen (TYLENOL) 500 MG tablet Take 500 mg by mouth every 6 hours as needed for Pain      Melatonin 5 MG CAPS Take 5 mg by mouth nightly 30 capsule 0    fluticasone (FLONASE) 50 MCG/ACT nasal spray 1 spray by Each Nare route daily 1 Bottle 3    Lancet Device MISC 1 Device by Does not apply route once for 1 dose 1 Device 0    nitroGLYCERIN (NITROSTAT) 0.4 MG SL tablet Place 1 tablet under the tongue every 5 minutes as needed for Chest pain 25 tablet 1    Coenzyme Q10 (CO Q-10) 30 MG CAPS Take 30 mg by mouth daily  Ascorbic Acid (VITAMIN C) 250 MG tablet Take 250 mg by mouth daily      ferrous sulfate 325 (65 FE) MG tablet Take 325 mg by mouth daily        No current facility-administered medications for this visit. Allergies   Allergen Reactions    Sulfa Antibiotics Anaphylaxis       Health Maintenance   Topic Date Due    DTaP/Tdap/Td vaccine (1 - Tdap) Never done    Shingles Vaccine (1 of 2) Never done   [employer] Visit (AWV)  07/18/2021    Lipid screen  02/08/2022    Potassium monitoring  02/08/2022    Creatinine monitoring  02/08/2022    Flu vaccine  Completed    Pneumococcal 65+ years Vaccine  Completed    COVID-19 Vaccine  Completed    Hepatitis A vaccine  Aged Out    Hib vaccine  Aged Out    Meningococcal (ACWY) vaccine  Aged Out       Subjective:     Review of Systems   Constitutional: Negative for chills and fever. HENT: Negative for rhinorrhea and sore throat. Eyes: Negative for discharge and redness. Respiratory: Positive for shortness of breath (a little with exertion). Negative for cough and wheezing. Cardiovascular: Positive for palpitations. Negative for chest pain. Gastrointestinal: Negative for abdominal pain, diarrhea, nausea and vomiting. Genitourinary: Negative for dysuria and frequency. Musculoskeletal: Negative for arthralgias and myalgias. Neurological: Positive for light-headedness (if stands up too fast). Negative for dizziness and headaches. Psychiatric/Behavioral: Negative for sleep disturbance. Objective:     /74   Pulse 58   Wt 181 lb 6.4 oz (82.3 kg)   SpO2 100%   BMI 26.03 kg/m²   Physical Exam  Vitals and nursing note reviewed. Constitutional:       General: He is not in acute distress. Appearance: He is well-developed. He is not ill-appearing. HENT:      Head: Normocephalic and atraumatic. Right Ear: External ear normal.      Left Ear: External ear normal.   Eyes:      General: No scleral icterus.         Right eye: No discharge. Left eye: No discharge. Conjunctiva/sclera: Conjunctivae normal.      Pupils: Pupils are equal, round, and reactive to light. Neck:      Thyroid: No thyromegaly. Trachea: No tracheal deviation. Cardiovascular:      Rate and Rhythm: Normal rate and regular rhythm. Heart sounds: Normal heart sounds. Pulmonary:      Effort: Pulmonary effort is normal. No respiratory distress. Breath sounds: Normal breath sounds. No wheezing. Lymphadenopathy:      Cervical: No cervical adenopathy. Skin:     General: Skin is warm. Findings: No rash. Neurological:      Mental Status: He is alert and oriented to person, place, and time. Psychiatric:         Mood and Affect: Mood normal.         Behavior: Behavior normal.         Thought Content: Thought content normal.         Assessment/Plan:   1. Type 2 diabetes mellitus without complication, with long-term current use of insulin ([facility])  -     POCT glycosylated hemoglobin (Hb A1C)  -     Blood Glucose Monitoring Suppl (FREESTYLE FREEDOM LITE) w/Device KIT; DAILY PRN Starting Mon 2021, Disp-1 kit, R-0, Normal  2. Essential hypertension  Assessment & Plan:   Well-controlled, continue current medications  3. Paroxysmal atrial fibrillation [facility])  Assessment & Plan:   Monitored by specialist- no acute findings meriting change in the plan     Return in about 3 months (around 2022) for [address] and HbA1c. Orders Placed This Encounter   Procedures    POCT glycosylated hemoglobin (Hb A1C)     Orders Placed This Encounter   Medications    Blood Glucose Monitoring Suppl (FREESTYLE FREEDOM LITE) w/Device KIT     Si kit by Does not apply route daily as needed (check sugars bid and prn)     Dispense:  1 kit     Refill:  0       Patient given educational materials - see patient instructions. Discussed use, benefit, and side effects of prescribed medications. All patient questions answered. Pt voiced understanding.   Reviewed health maintenance. Instructed to continue current medications, diet and exercise. Patient agreed with treatment plan. Follow up as directed.      Electronicallysigned by Richard Beckford MD on 11/22/2021 at 11:42 AM

## 2021-12-03 RX ORDER — AMLODIPINE BESYLATE 5 MG/1
TABLET ORAL
Qty: 90 TABLET | Refills: 3 | Status: SHIPPED | OUTPATIENT
Start: 2021-12-03

## 2021-12-03 RX ORDER — LISINOPRIL AND HYDROCHLOROTHIAZIDE 20; 12.5 MG/1; MG/1
TABLET ORAL
Qty: 90 TABLET | Refills: 3 | Status: SHIPPED | OUTPATIENT
Start: 2021-12-03 | End: 2022-08-26

## 2022-01-11 DIAGNOSIS — F41.9 ANXIETY: Primary | ICD-10-CM

## 2022-01-12 RX ORDER — LORAZEPAM 0.5 MG/1
TABLET ORAL
Qty: 60 TABLET | Refills: 0 | Status: SHIPPED | OUTPATIENT
Start: 2022-01-12 | End: 2022-02-11

## 2022-02-11 ASSESSMENT — LIFESTYLE VARIABLES
AUDIT-C TOTAL SCORE: INCOMPLETE
AUDIT TOTAL SCORE: INCOMPLETE
HOW OFTEN DO YOU HAVE A DRINK CONTAINING ALCOHOL: 0
HOW OFTEN DO YOU HAVE A DRINK CONTAINING ALCOHOL: NEVER

## 2022-02-11 ASSESSMENT — PATIENT HEALTH QUESTIONNAIRE - PHQ9
SUM OF ALL RESPONSES TO PHQ QUESTIONS 1-9: 2
1. LITTLE INTEREST OR PLEASURE IN DOING THINGS: 2
2. FEELING DOWN, DEPRESSED OR HOPELESS: 0
SUM OF ALL RESPONSES TO PHQ QUESTIONS 1-9: 2
SUM OF ALL RESPONSES TO PHQ9 QUESTIONS 1 & 2: 2

## 2022-02-21 ENCOUNTER — OFFICE VISIT (OUTPATIENT)
Dept: PRIMARY CARE CLINIC | Age: 87
End: 2022-02-21
Payer: MEDICARE

## 2022-02-21 VITALS
DIASTOLIC BLOOD PRESSURE: 70 MMHG | HEIGHT: 70 IN | BODY MASS INDEX: 25.88 KG/M2 | HEART RATE: 64 BPM | SYSTOLIC BLOOD PRESSURE: 120 MMHG | OXYGEN SATURATION: 97 % | WEIGHT: 180.8 LBS

## 2022-02-21 DIAGNOSIS — E11.9 TYPE 2 DIABETES MELLITUS WITHOUT COMPLICATION, WITH LONG-TERM CURRENT USE OF INSULIN (HCC): Primary | ICD-10-CM

## 2022-02-21 DIAGNOSIS — I48.0 PAROXYSMAL ATRIAL FIBRILLATION (HCC): ICD-10-CM

## 2022-02-21 DIAGNOSIS — Z79.4 TYPE 2 DIABETES MELLITUS WITHOUT COMPLICATION, WITH LONG-TERM CURRENT USE OF INSULIN (HCC): ICD-10-CM

## 2022-02-21 DIAGNOSIS — I20.9 ANGINA PECTORIS, UNSPECIFIED (HCC): ICD-10-CM

## 2022-02-21 DIAGNOSIS — E11.9 TYPE 2 DIABETES MELLITUS WITHOUT COMPLICATION, WITH LONG-TERM CURRENT USE OF INSULIN (HCC): ICD-10-CM

## 2022-02-21 DIAGNOSIS — E11.319 DIABETIC RETINOPATHY OF BOTH EYES WITHOUT MACULAR EDEMA ASSOCIATED WITH TYPE 2 DIABETES MELLITUS, UNSPECIFIED RETINOPATHY SEVERITY (HCC): ICD-10-CM

## 2022-02-21 DIAGNOSIS — Z00.00 MEDICARE ANNUAL WELLNESS VISIT, SUBSEQUENT: ICD-10-CM

## 2022-02-21 DIAGNOSIS — Z79.4 TYPE 2 DIABETES MELLITUS WITHOUT COMPLICATION, WITH LONG-TERM CURRENT USE OF INSULIN (HCC): Primary | ICD-10-CM

## 2022-02-21 LAB
ABSOLUTE EOS #: 0.16 K/UL (ref 0–0.44)
ABSOLUTE IMMATURE GRANULOCYTE: <0.03 K/UL (ref 0–0.3)
ABSOLUTE LYMPH #: 2.4 K/UL (ref 1.1–3.7)
ABSOLUTE MONO #: 0.79 K/UL (ref 0.1–1.2)
ALBUMIN SERPL-MCNC: 4.1 G/DL (ref 3.5–5.2)
ALBUMIN/GLOBULIN RATIO: 1.5 (ref 1–2.5)
ALP BLD-CCNC: 90 U/L (ref 40–129)
ALT SERPL-CCNC: 14 U/L (ref 5–41)
ANION GAP SERPL CALCULATED.3IONS-SCNC: 12 MMOL/L (ref 9–17)
AST SERPL-CCNC: 22 U/L
BASOPHILS # BLD: 1 % (ref 0–2)
BASOPHILS ABSOLUTE: 0.05 K/UL (ref 0–0.2)
BILIRUB SERPL-MCNC: 0.31 MG/DL (ref 0.3–1.2)
BUN BLDV-MCNC: 25 MG/DL (ref 8–23)
BUN/CREAT BLD: ABNORMAL (ref 9–20)
CALCIUM SERPL-MCNC: 9.3 MG/DL (ref 8.6–10.4)
CHLORIDE BLD-SCNC: 105 MMOL/L (ref 98–107)
CO2: 23 MMOL/L (ref 20–31)
CREAT SERPL-MCNC: 1.24 MG/DL (ref 0.7–1.2)
DIFFERENTIAL TYPE: ABNORMAL
EOSINOPHILS RELATIVE PERCENT: 2 % (ref 1–4)
GFR AFRICAN AMERICAN: >60 ML/MIN
GFR NON-AFRICAN AMERICAN: 55 ML/MIN
GFR SERPL CREATININE-BSD FRML MDRD: ABNORMAL ML/MIN/{1.73_M2}
GFR SERPL CREATININE-BSD FRML MDRD: ABNORMAL ML/MIN/{1.73_M2}
GLUCOSE BLD-MCNC: 236 MG/DL (ref 70–99)
HBA1C MFR BLD: 8 %
HCT VFR BLD CALC: 36.5 % (ref 40.7–50.3)
HEMOGLOBIN: 11.9 G/DL (ref 13–17)
IMMATURE GRANULOCYTES: 0 %
LYMPHOCYTES # BLD: 30 % (ref 24–43)
MAGNESIUM: 1.8 MG/DL (ref 1.6–2.6)
MCH RBC QN AUTO: 31.5 PG (ref 25.2–33.5)
MCHC RBC AUTO-ENTMCNC: 32.6 G/DL (ref 28.4–34.8)
MCV RBC AUTO: 96.6 FL (ref 82.6–102.9)
MONOCYTES # BLD: 10 % (ref 3–12)
NRBC AUTOMATED: 0 PER 100 WBC
PDW BLD-RTO: 12.3 % (ref 11.8–14.4)
PLATELET # BLD: 206 K/UL (ref 138–453)
PLATELET ESTIMATE: ABNORMAL
PMV BLD AUTO: 11.3 FL (ref 8.1–13.5)
POTASSIUM SERPL-SCNC: 5.5 MMOL/L (ref 3.7–5.3)
RBC # BLD: 3.78 M/UL (ref 4.21–5.77)
RBC # BLD: ABNORMAL 10*6/UL
SEG NEUTROPHILS: 57 % (ref 36–65)
SEGMENTED NEUTROPHILS ABSOLUTE COUNT: 4.54 K/UL (ref 1.5–8.1)
SODIUM BLD-SCNC: 140 MMOL/L (ref 135–144)
TOTAL PROTEIN: 6.8 G/DL (ref 6.4–8.3)
WBC # BLD: 8 K/UL (ref 3.5–11.3)
WBC # BLD: ABNORMAL 10*3/UL

## 2022-02-21 PROCEDURE — 83036 HEMOGLOBIN GLYCOSYLATED A1C: CPT | Performed by: FAMILY MEDICINE

## 2022-02-21 PROCEDURE — 3052F HG A1C>EQUAL 8.0%<EQUAL 9.0%: CPT | Performed by: FAMILY MEDICINE

## 2022-02-21 PROCEDURE — 4040F PNEUMOC VAC/ADMIN/RCVD: CPT | Performed by: FAMILY MEDICINE

## 2022-02-21 PROCEDURE — G8484 FLU IMMUNIZE NO ADMIN: HCPCS | Performed by: FAMILY MEDICINE

## 2022-02-21 PROCEDURE — G0439 PPPS, SUBSEQ VISIT: HCPCS | Performed by: FAMILY MEDICINE

## 2022-02-21 PROCEDURE — 1123F ACP DISCUSS/DSCN MKR DOCD: CPT | Performed by: FAMILY MEDICINE

## 2022-02-21 RX ORDER — METFORMIN HYDROCHLORIDE 500 MG/1
1500 TABLET, EXTENDED RELEASE ORAL
Qty: 270 TABLET | Refills: 3 | Status: SHIPPED | OUTPATIENT
Start: 2022-02-21 | End: 2022-08-26

## 2022-02-21 SDOH — ECONOMIC STABILITY: FOOD INSECURITY: WITHIN THE PAST 12 MONTHS, THE FOOD YOU BOUGHT JUST DIDN'T LAST AND YOU DIDN'T HAVE MONEY TO GET MORE.: NEVER TRUE

## 2022-02-21 SDOH — ECONOMIC STABILITY: FOOD INSECURITY: WITHIN THE PAST 12 MONTHS, YOU WORRIED THAT YOUR FOOD WOULD RUN OUT BEFORE YOU GOT MONEY TO BUY MORE.: NEVER TRUE

## 2022-02-21 ASSESSMENT — PATIENT HEALTH QUESTIONNAIRE - PHQ9
SUM OF ALL RESPONSES TO PHQ QUESTIONS 1-9: 0
2. FEELING DOWN, DEPRESSED OR HOPELESS: 0
SUM OF ALL RESPONSES TO PHQ QUESTIONS 1-9: 0
SUM OF ALL RESPONSES TO PHQ QUESTIONS 1-9: 0
1. LITTLE INTEREST OR PLEASURE IN DOING THINGS: 0
SUM OF ALL RESPONSES TO PHQ9 QUESTIONS 1 & 2: 0
SUM OF ALL RESPONSES TO PHQ QUESTIONS 1-9: 0

## 2022-02-21 ASSESSMENT — SOCIAL DETERMINANTS OF HEALTH (SDOH): HOW HARD IS IT FOR YOU TO PAY FOR THE VERY BASICS LIKE FOOD, HOUSING, MEDICAL CARE, AND HEATING?: NOT HARD AT ALL

## 2022-02-21 NOTE — PATIENT INSTRUCTIONS
Personalized Preventive Plan for Patience Repress - 2/21/2022  Medicare offers a range of preventive health benefits. Some of the tests and screenings are paid in full while other may be subject to a deductible, co-insurance, and/or copay. Some of these benefits include a comprehensive review of your medical history including lifestyle, illnesses that may run in your family, and various assessments and screenings as appropriate. After reviewing your medical record and screening and assessments performed today your provider may have ordered immunizations, labs, imaging, and/or referrals for you. A list of these orders (if applicable) as well as your Preventive Care list are included within your After Visit Summary for your review. Other Preventive Recommendations:    · A preventive eye exam performed by an eye specialist is recommended every 1-2 years to screen for glaucoma; cataracts, macular degeneration, and other eye disorders. · A preventive dental visit is recommended every 6 months. · Try to get at least 150 minutes of exercise per week or 10,000 steps per day on a pedometer . · Order or download the FREE \"Exercise & Physical Activity: Your Everyday Guide\" from [employer]. Call 8-581.807.7271 or search [employer] online. · You need 7464-9129 mg of calcium and 1941-9423 IU of vitamin D per day. It is possible to meet your calcium requirement with diet alone, but a vitamin D supplement is usually necessary to meet this goal.  · When exposed to the sun, use a sunscreen that protects against both UVA and UVB radiation with an SPF of 30 or greater. Reapply every 2 to 3 hours or after sweating, drying off with a towel, or swimming. · Always wear a seat belt when traveling in a car. Always wear a helmet when riding a bicycle or motorcycle.

## 2022-02-21 NOTE — PROGRESS NOTES
Medicare Annual Wellness Visit    David Viveros is here for Medicare AWV (Patient was given the words respect elephant and green to remember. He was given the time of 3:45 to draw) and Diabetes (Patient checks blood sugar BID )    Assessment & Plan   Ishmael Gage was seen today for medicare awv and diabetes. Diagnoses and all orders for this visit:    Type 2 diabetes mellitus without complication, with long-term current use of insulin (HCC)  -     POCT glycosylated hemoglobin (Hb A1C)    Diabetic retinopathy of both eyes without macular edema associated with type 2 diabetes mellitus, unspecified retinopathy severity (HCC)    Paroxysmal atrial fibrillation (HCC)    Angina pectoris, unspecified ([facility] [address]         Recommendations for Preventive Services Due: see orders and patient instructions/AVS.  Recommended screening schedule for the next 5-10 years is provided to the patient in written form: see Patient Instructions/AVS.     No follow-ups on file. Reviewed and updated this visit by clinical staff:  Tobacco  Allergies  Meds  Med Hx  Surg Hx  Soc Hx  Fam Hx      Subjective   The following acute and/or chronic problems were also addressed today:  Sugars not wanting to come down- activity is decreased deana due to age and winter. Patient's complete Health Risk Assessment and screening values have been reviewed and are found in Flowsheets. The following problems were reviewed today and where indicated follow up appointments were made and/or referrals ordered.     Positive Risk Factor Screenings with Interventions:               Hearing/Vision:  No exam data present  Hearing/Vision  Do you or your family notice any trouble with your hearing that hasn't been managed with hearing aids?: (!) Yes  Do you have difficulty driving, watching TV, or doing any of your daily activities because of your eyesight?: No  Have you had an eye exam within the past year?: Yes    Hearing/Vision Interventions:  · Hearing concerns: referred to [facility]         Objective         Physical Exam  Vitals and nursing note reviewed. Constitutional:       General: He is not in acute distress. Appearance: He is well-developed. He is not ill-appearing. HENT:      Head: Normocephalic and atraumatic. Right Ear: External ear normal.      Left Ear: External ear normal.   Eyes:      General: No scleral icterus. Right eye: No discharge. Left eye: No discharge. Conjunctiva/sclera: Conjunctivae normal.      Pupils: Pupils are equal, round, and reactive to light. Neck:      Thyroid: No thyromegaly. Trachea: No tracheal deviation. Cardiovascular:      Rate and Rhythm: Normal rate and regular rhythm. Heart sounds: Normal heart sounds. Pulmonary:      Effort: Pulmonary effort is normal. No respiratory distress. Breath sounds: Normal breath sounds. No wheezing. Lymphadenopathy:      Cervical: No cervical adenopathy. Skin:     General: Skin is warm. Findings: No rash. Neurological:      Mental Status: He is alert and oriented to person, place, and time. Psychiatric:         Mood and Affect: Mood normal.         Behavior: Behavior normal.         Thought Content: Thought content normal.           Allergies   Allergen Reactions    Sulfa Antibiotics Anaphylaxis     Prior to Visit Medications    Medication Sig Taking?  Authorizing Provider   amLODIPine (NORVASC) 5 MG tablet TAKE 1 TABLET DAILY Yes Felisha Pang MD   lisinopril-hydroCHLOROthiazide (PRINZIDE;ZESTORETIC) 20-12.5 MG per tablet TAKE 1 TABLET DAILY Yes Felisha Pang MD   Blood Glucose Monitoring Suppl (FREESTYLE FREEDOM LITE) w/Device KIT 1 kit by Does not apply route daily as needed (check sugars bid and prn) Yes Felisha Pang MD   blood glucose test strips (FREESTYLE TEST STRIPS) strip USE ONE NEW STRIP TO TEST BLOOD SUGAR TWICE A DAY AND AS NEEDED Yes Felisha Pang MD   Accu-Chek FastClix Lancets MISC USE ONE LANCET TO TEST TWICE A DAY AND AS NEEDED Yes Vernard Galeazzi, MD   metoprolol succinate (TOPROL XL) 25 MG extended release tablet Take 1 tablet by mouth daily 1/2 tab qd Yes Vernard Galeazzi, MD   insulin glargine (LANTUS SOLOSTAR) 100 UNIT/ML injection pen Inject 15 Units into the skin nightly Yes Vernard Galeazzi, MD   metFORMIN (GLUCOPHAGE-XR) 500 MG extended release tablet TAKE 2 TABLETS DAILY WITH BREAKFAST Yes Vernard Galeazzi, MD   clopidogrel (PLAVIX) 75 MG tablet TAKE 1 TABLET DAILY Yes Vernard Galeazzi, MD   atorvastatin (LIPITOR) 80 MG tablet Take 1 tablet by mouth nightly Yes Vernard Galeazzi, MD   XARELTO 20 MG TABS tablet TAKE 1 TABLET DAILY Yes Vernard Galeazzi, MD   acetaminophen (TYLENOL) 500 MG tablet Take 500 mg by mouth every 6 hours as needed for Pain Yes Historical Provider, MD   Lancet Device MISC 1 Device by Does not apply route once for 1 dose Yes Vernard Galeazzi, MD   nitroGLYCERIN (NITROSTAT) 0.4 MG SL tablet Place 1 tablet under the tongue every 5 minutes as needed for Chest pain Yes Juan David Kline MD   Coenzyme Q10 (CO Q-10) 30 MG CAPS Take 30 mg by mouth daily Yes Historical Provider, MD   ferrous sulfate 325 (65 FE) MG tablet Take 325 mg by mouth daily  Yes Historical Provider, MD   [address] [place] [address] X 8 MM MISC INJECT 1 NEEDLE INTO THE SKIN DAILY  Patient not taking: Reported on 2/21/2022  Vernard Galeazzi, MD   Melatonin 5 MG CAPS Take 5 mg by mouth nightly  Patient not taking: Reported on 2/21/2022  Vernard Galeazzi, MD   fluticasone [facility]) 50 MCG/ACT nasal spray 1 spray by Each Nare route daily  Patient not taking: Reported on 2/21/2022  Vernard Galeazzi, MD   Ascorbic Acid (VITAMIN C) 250 MG tablet Take 250 mg by mouth daily  Historical Provider, MD Almaraz (Including outside providers/suppliers regularly involved in providing care):   Patient Care Team:  Vernard Galeazzi, MD as PCP - General (Family Medicine)  Gloria Vaughan MD as PCP - [facility] Empaneled Provider

## 2022-02-23 DIAGNOSIS — E87.5 HYPERKALEMIA: Primary | ICD-10-CM

## 2022-02-23 NOTE — RESULT ENCOUNTER NOTE
Adv pt results okay except potassium was little high- have him avoid high potassium foods- send list and recheck BMP in 1-2 weeks- order entered

## 2022-03-08 DIAGNOSIS — E87.5 HYPERKALEMIA: ICD-10-CM

## 2022-03-08 LAB
ANION GAP SERPL CALCULATED.3IONS-SCNC: 12 MMOL/L (ref 9–17)
BUN BLDV-MCNC: 27 MG/DL (ref 8–23)
BUN/CREAT BLD: ABNORMAL (ref 9–20)
CALCIUM SERPL-MCNC: 9.3 MG/DL (ref 8.6–10.4)
CHLORIDE BLD-SCNC: 104 MMOL/L (ref 98–107)
CO2: 23 MMOL/L (ref 20–31)
CREAT SERPL-MCNC: 1.2 MG/DL (ref 0.7–1.2)
GFR AFRICAN AMERICAN: >60 ML/MIN
GFR NON-AFRICAN AMERICAN: 57 ML/MIN
GFR SERPL CREATININE-BSD FRML MDRD: ABNORMAL ML/MIN/{1.73_M2}
GFR SERPL CREATININE-BSD FRML MDRD: ABNORMAL ML/MIN/{1.73_M2}
GLUCOSE BLD-MCNC: 303 MG/DL (ref 70–99)
POTASSIUM SERPL-SCNC: 5 MMOL/L (ref 3.7–5.3)
SODIUM BLD-SCNC: 139 MMOL/L (ref 135–144)

## 2022-03-12 DIAGNOSIS — F41.9 ANXIETY: Primary | ICD-10-CM

## 2022-03-14 RX ORDER — LORAZEPAM 0.5 MG/1
TABLET ORAL
Qty: 60 TABLET | Refills: 0 | Status: SHIPPED | OUTPATIENT
Start: 2022-03-14 | End: 2022-04-13

## 2022-03-18 RX ORDER — ATORVASTATIN CALCIUM 80 MG/1
TABLET, FILM COATED ORAL
Qty: 90 TABLET | Refills: 0 | Status: SHIPPED | OUTPATIENT
Start: 2022-03-18 | End: 2022-06-06

## 2022-04-04 DIAGNOSIS — E11.9 TYPE 2 DIABETES MELLITUS WITHOUT COMPLICATION, WITH LONG-TERM CURRENT USE OF INSULIN (HCC): ICD-10-CM

## 2022-04-04 DIAGNOSIS — Z79.4 TYPE 2 DIABETES MELLITUS WITHOUT COMPLICATION, WITH LONG-TERM CURRENT USE OF INSULIN (HCC): ICD-10-CM

## 2022-04-04 RX ORDER — RIVAROXABAN 20 MG/1
TABLET, FILM COATED ORAL
Qty: 90 TABLET | Refills: 3 | Status: SHIPPED | OUTPATIENT
Start: 2022-04-04 | End: 2022-10-31

## 2022-04-06 RX ORDER — INSULIN GLARGINE 100 [IU]/ML
15 INJECTION, SOLUTION SUBCUTANEOUS NIGHTLY
Qty: 12 ML | Refills: 2 | Status: SHIPPED | OUTPATIENT
Start: 2022-04-06

## 2022-04-18 ENCOUNTER — OFFICE VISIT (OUTPATIENT)
Dept: PRIMARY CARE CLINIC | Age: 87
End: 2022-04-18

## 2022-04-18 ENCOUNTER — HOSPITAL ENCOUNTER (OUTPATIENT)
Age: 87
Setting detail: SPECIMEN
Discharge: HOME | End: 2022-04-18

## 2022-04-18 VITALS
WEIGHT: 182 LBS | HEART RATE: 57 BPM | HEIGHT: 70 IN | DIASTOLIC BLOOD PRESSURE: 64 MMHG | OXYGEN SATURATION: 100 % | SYSTOLIC BLOOD PRESSURE: 118 MMHG | BODY MASS INDEX: 26.05 KG/M2

## 2022-04-18 DIAGNOSIS — L57.0 ACTINIC KERATOSES: Primary | ICD-10-CM

## 2022-04-18 DIAGNOSIS — D48.5 NEOPLASM OF UNCERTAIN BEHAVIOR OF SKIN: ICD-10-CM

## 2022-04-18 NOTE — PROGRESS NOTES
Actinic Keratoses destruction procedure note:  4/18/2022  Alek Mendoza  1935    /64   Pulse 57   Ht 5' 10\" (1.778 m)   Wt 182 lb (82.6 kg)   SpO2 100%   BMI 26.11 kg/m²   ALL VITALS REVIEWED    Allergies   Allergen Reactions    Sulfa Antibiotics Anaphylaxis       Chief Complaint   Patient presents with    6 Month Follow-Up     6 month AK check on face      Written consent was obtained. Lesion(s) cleansed with Alcohol. Location:  Face and ears    Histofreeze applied with applicator. Numberlesions treated: 26    Physical Exam    Patient tolerated procedure well. Diagnosis Orders   1. Actinic keratoses  MD DESTRUC PREMALIGNANT, FIRST LESION    MD DESTRUC PREMALIGNANT,2-14 LESIONS   2. Neoplasm of uncertain behavior of skin  Derm biopsy       Follow Up: Wound care discussed. Keep well moisturized. Watch for signs of infection which would include:  Redness, swelling, fever. If signs appear, please return to office. Return in about 6 months (around 10/18/2022) for AK treatment--15 min.     Electronically signed by Mouna Gomes PA-C on 4/18/2022 at 11:20 AM

## 2022-04-18 NOTE — PROGRESS NOTES
Skin Biopsy Procedure Note  4/18/2022  Feli Labrum  1935    /64   Pulse 57   Ht 5' 10\" (1.778 m)   Wt 182 lb (82.6 kg)   SpO2 100%   BMI 26.11 kg/m²   VITALS REVIEWED    Allergies   Allergen Reactions    Sulfa Antibiotics Anaphylaxis       Chief Complaint   Patient presents with    6 Month Follow-Up     6 month AK check on face        Lesion:  · 1. Right cheek        Indication for Biopsy 1: non healing, growing lesion    Procedure: The lesion(s) was cleansed with Alcohol.  2% lidocaine with epinephrine was used for local anaesthesia. A 1cmm  shave was used to obtain a specimen. The specimen(s) was    preserved and sent for pathological examination. The biopsy site(s) was  cleansed and hemostasis using ACL and silver nitrate and a pressure dressing(s) was achieved with good results. The patient was instructed on wound care. No complications occurred and the patient tolerated the procedure well. Diagnosis Orders   1. Actinic keratoses  NJ DESTRUC PREMALIGNANT, FIRST LESION    N[place] DESTRUC PREMALIGNANT,2-14 LESIONS       Follow Up: Wound care discussed. Keep well moisturized. Watch for signs of infection which would include:  Redness, swelling, fever. If signs appear, please return to office. Return in about 6 months (around 10/18/2022) for AK treatment--15 min.        Electronically signed by Lee Doe PA-C on 4/18/2022 at 11:18 AM

## 2022-04-22 LAB — DERMATOLOGY PATHOLOGY REPORT: NORMAL

## 2022-05-05 ENCOUNTER — OFFICE VISIT (OUTPATIENT)
Dept: PRIMARY CARE CLINIC | Age: 87
End: 2022-05-05
Payer: MEDICARE

## 2022-05-05 VITALS
DIASTOLIC BLOOD PRESSURE: 74 MMHG | HEART RATE: 60 BPM | SYSTOLIC BLOOD PRESSURE: 122 MMHG | HEIGHT: 70 IN | BODY MASS INDEX: 26.34 KG/M2 | OXYGEN SATURATION: 99 % | WEIGHT: 184 LBS

## 2022-05-05 DIAGNOSIS — D04.9 SQUAMOUS CELL CARCINOMA IN SITU (SCCIS) OF SKIN: Primary | ICD-10-CM

## 2022-05-05 NOTE — PROGRESS NOTES
Actinic Keratoses destruction procedure note:  5/5/2022  Ranjeet Diop  1935    /74   Pulse 60   Ht 5' 10\" (1.778 m)   Wt 184 lb (83.5 kg)   SpO2 99%   BMI 26.40 kg/m²   ALL VITALS REVIEWED    Allergies   Allergen Reactions    Sulfa Antibiotics Anaphylaxis       Chief Complaint   Patient presents with    Other     cryotherapy right cheek     Written consent was obtained. Lesion(s) cleansed with Alcohol. Location:  SCC in situ  of right cheek    Histofreeze applied with applicator. Numberlesions treated: {Numbers; 1-2:23355:o}    Physical Exam    Patient tolerated procedure well. 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 Keep well moisturized. Watch for signs of infection which would include:  Redness, swelling, fever. If signs appear, please return to office. No follow-ups on file.     Electronically signed by Hyacinth Olivares PA-C on 5/5/2022 at 10:38 AM

## 2022-05-08 DIAGNOSIS — F41.9 ANXIETY: Primary | ICD-10-CM

## 2022-05-09 RX ORDER — LORAZEPAM 0.5 MG/1
TABLET ORAL
Qty: 60 TABLET | Refills: 0 | Status: SHIPPED | OUTPATIENT
Start: 2022-05-09 | End: 2022-06-08

## 2022-05-09 RX ORDER — METOPROLOL SUCCINATE 25 MG/1
25 TABLET, EXTENDED RELEASE ORAL DAILY
Qty: 15 TABLET | Refills: 3 | Status: SHIPPED | OUTPATIENT
Start: 2022-05-09 | End: 2022-05-12 | Stop reason: SDUPTHER

## 2022-05-12 RX ORDER — METOPROLOL SUCCINATE 25 MG/1
12.5 TABLET, EXTENDED RELEASE ORAL DAILY
Qty: 45 TABLET | Refills: 3 | Status: SHIPPED | OUTPATIENT
Start: 2022-05-12 | End: 2022-05-17 | Stop reason: SDUPTHER

## 2022-05-17 RX ORDER — METOPROLOL SUCCINATE 25 MG/1
12.5 TABLET, EXTENDED RELEASE ORAL DAILY
Qty: 45 TABLET | Refills: 3 | Status: SHIPPED | OUTPATIENT
Start: 2022-05-17 | End: 2022-08-28

## 2022-05-19 ENCOUNTER — OFFICE VISIT (OUTPATIENT)
Dept: PRIMARY CARE CLINIC | Age: 87
End: 2022-05-19
Payer: MEDICARE

## 2022-05-19 VITALS
OXYGEN SATURATION: 100 % | BODY MASS INDEX: 26.34 KG/M2 | WEIGHT: 184 LBS | HEART RATE: 56 BPM | SYSTOLIC BLOOD PRESSURE: 122 MMHG | HEIGHT: 70 IN | DIASTOLIC BLOOD PRESSURE: 78 MMHG

## 2022-05-19 DIAGNOSIS — L57.0 ACTINIC KERATOSES: Primary | ICD-10-CM

## 2022-05-19 DIAGNOSIS — D09.9 SQUAMOUS CELL CARCINOMA IN SITU: ICD-10-CM

## 2022-05-19 PROCEDURE — 17000 DESTRUCT PREMALG LESION: CPT | Performed by: PHYSICIAN ASSISTANT

## 2022-05-19 PROCEDURE — 17003 DESTRUCT PREMALG LES 2-14: CPT | Performed by: PHYSICIAN ASSISTANT

## 2022-05-19 PROCEDURE — 17281 DSTR MAL LS F/E/E/N/L/M .6-1: CPT | Performed by: PHYSICIAN ASSISTANT

## 2022-06-01 ENCOUNTER — OFFICE VISIT (OUTPATIENT)
Dept: PRIMARY CARE CLINIC | Age: 87
End: 2022-06-01
Payer: MEDICARE

## 2022-06-01 VITALS
HEART RATE: 58 BPM | BODY MASS INDEX: 26.11 KG/M2 | DIASTOLIC BLOOD PRESSURE: 52 MMHG | SYSTOLIC BLOOD PRESSURE: 118 MMHG | OXYGEN SATURATION: 97 % | WEIGHT: 182 LBS

## 2022-06-01 DIAGNOSIS — Z79.4 TYPE 2 DIABETES MELLITUS WITHOUT COMPLICATION, WITH LONG-TERM CURRENT USE OF INSULIN (HCC): Primary | ICD-10-CM

## 2022-06-01 DIAGNOSIS — M43.06 LUMBAR SPONDYLOLYSIS: ICD-10-CM

## 2022-06-01 DIAGNOSIS — F41.9 ANXIETY: ICD-10-CM

## 2022-06-01 DIAGNOSIS — I10 ESSENTIAL HYPERTENSION: ICD-10-CM

## 2022-06-01 DIAGNOSIS — I48.0 PAROXYSMAL ATRIAL FIBRILLATION (HCC): ICD-10-CM

## 2022-06-01 DIAGNOSIS — E11.9 TYPE 2 DIABETES MELLITUS WITHOUT COMPLICATION, WITH LONG-TERM CURRENT USE OF INSULIN (HCC): Primary | ICD-10-CM

## 2022-06-01 LAB — HBA1C MFR BLD: 7.2 %

## 2022-06-01 PROCEDURE — 1036F TOBACCO NON-USER: CPT | Performed by: FAMILY MEDICINE

## 2022-06-01 PROCEDURE — 83036 HEMOGLOBIN GLYCOSYLATED A1C: CPT | Performed by: FAMILY MEDICINE

## 2022-06-01 PROCEDURE — 3051F HG A1C>EQUAL 7.0%<8.0%: CPT | Performed by: FAMILY MEDICINE

## 2022-06-01 PROCEDURE — G8427 DOCREV CUR MEDS BY ELIG CLIN: HCPCS | Performed by: FAMILY MEDICINE

## 2022-06-01 PROCEDURE — 1123F ACP DISCUSS/DSCN MKR DOCD: CPT | Performed by: FAMILY MEDICINE

## 2022-06-01 PROCEDURE — G8417 CALC BMI ABV UP PARAM F/U: HCPCS | Performed by: FAMILY MEDICINE

## 2022-06-01 PROCEDURE — 99214 OFFICE O/P EST MOD 30 MIN: CPT | Performed by: FAMILY MEDICINE

## 2022-06-01 ASSESSMENT — ENCOUNTER SYMPTOMS
VOMITING: 0
EYE PAIN: 0
DIARRHEA: 0
SINUS PAIN: 0
NAUSEA: 0
COUGH: 0
BACK PAIN: 1
CHEST TIGHTNESS: 0
SORE THROAT: 0
RHINORRHEA: 1
ABDOMINAL PAIN: 0
EYE DISCHARGE: 0
SHORTNESS OF BREATH: 1

## 2022-06-01 ASSESSMENT — PATIENT HEALTH QUESTIONNAIRE - PHQ9
SUM OF ALL RESPONSES TO PHQ QUESTIONS 1-9: 0
SUM OF ALL RESPONSES TO PHQ9 QUESTIONS 1 & 2: 0
1. LITTLE INTEREST OR PLEASURE IN DOING THINGS: 0
SUM OF ALL RESPONSES TO PHQ QUESTIONS 1-9: 0
2. FEELING DOWN, DEPRESSED OR HOPELESS: 0
SUM OF ALL RESPONSES TO PHQ QUESTIONS 1-9: 0
SUM OF ALL RESPONSES TO PHQ QUESTIONS 1-9: 0

## 2022-06-01 NOTE — PROGRESS NOTES
[address] [facility] PRIMARY [facility]  [address] [facility] [address]  Dept: [phone]    Shazia Castro is a 80 y.o. male Established patient, who presents today for his medical conditions/complaints as noted below  Chief Complaint   Patient presents with    Hypertension     Patient checks B/P at times    Diabetes     Patient checks blood sugar BID       HPI:     HPI  He presents to the office for a 3-month follow-up visit. He has a history of DM for which he checks his blood glucose at home twice a day. He states it runs around 120-130. He reports taking metformin and Lantus every morning. He tries to stay away from sugar and carbs. He is doing yard work. Denies shakiness, weakness, confusion, dry mouth, nausea, vomiting, dizziness, lightheadedness, or headaches. He has a history of HTN for which he occasionally checks his BP when he does not feel great. He states it runs around 120/70s. He admits shortness of breath on exertion. Denies chest pain, vision changes, palpitations, tachycardia, or syncope. He has a history of atrial fibrillation. He states has not noticed himself go back into A fib in the past 3 months. He has spinal stenosis and sciatica in his right leg which he states has gotten gradually worse this past year and affects his mobility and ambulation. He takes Tylenol when pain is very bad. He will use a heating pad for pain. He takes Ativan 2-3 times a week for anxiety.      Reviewed prior notes:  None  Reviewed previous:  Labs    Hemoglobin A1C (%)   Date Value   06/01/2022 7.2   02/21/2022 8.0   11/22/2021 7.6             ( goal A1C is < 7)   No results found for: LABMICR  LDL Cholesterol (mg/dL)   Date Value   01/26/2018 39   10/05/2015 51     LDL Calculated (mg/dL)   Date Value   02/08/2021 51   03/06/2018 54   10/18/2016 70       (goal LDL is <100)   AST (U/L)   Date Value   02/21/2022 22     ALT (U/L)   Date Value   02/21/2022 14 BUN (mg/dL)   Date Value   03/08/2022 27 (H)     BP Readings from Last 3 Encounters:   06/01/22 (!) 118/52   05/19/22 122/78   05/05/22 122/74          (goal 140/90)    Past Medical History:   Diagnosis Date    Acute myocardial ischemia ([facility] 12/11/2015    Acute sinusitis 12/11/2015    Cerebral artery occlusion with cerebral infarction [facility])     Chest pain 10/5/2015    Diabetes mellitus ([facility]     H/O cataract     Hyperlipidemia     Hypertension     MI (myocardial infarction) ([facility] 1985    Neck stiffness 12/11/2015    Partial small bowel obstruction (HCC) 11/7/2019    Peyronie's disease     Rotator cuff tendonitis 12/11/2015    TIA (transient ischemic attack) 1998        Social History     Tobacco Use    Smoking status: Former Smoker     Packs/day: 0.50     Years: 8.00     Pack years: 4.00    Smokeless tobacco: Never Used   Substance Use Topics    Alcohol use: No      Current Outpatient Medications   Medication Sig Dispense Refill    metoprolol succinate (TOPROL XL) 25 MG extended release tablet Take 0.5 tablets by mouth daily 45 tablet 3    LORazepam (ATIVAN) 0.5 MG tablet TAKE ONE TABLET BY MOUTH EVERY 6 HOURS AS NEEDED FOR ANXIETY 60 tablet 0    LANTUS SOLOSTAR 100 UNIT/ML injection pen INJECT 15 UNITS INTO THE SKIN NIGHTLY 12 mL 2    XARELTO 20 MG TABS tablet TAKE 1 TABLET DAILY 90 tablet 3    atorvastatin (LIPITOR) 80 MG tablet TAKE 1 TABLET NIGHTLY 90 tablet 0    metFORMIN (GLUCOPHAGE-XR) 500 MG extended release tablet Take 3 tablets by mouth daily (with breakfast) 270 tablet 3    amLODIPine (NORVASC) 5 MG tablet TAKE 1 TABLET DAILY 90 tablet 3    lisinopril-hydroCHLOROthiazide (PRINZIDE;ZESTORETIC) 20-12.5 MG per tablet TAKE 1 TABLET DAILY 90 tablet 3    Blood Glucose Monitoring Suppl (FREESTYLE FREEDOM LITE) w/Device KIT 1 kit by Does not apply route daily as needed (check sugars bid and prn) 1 kit 0    blood glucose test strips (FREESTYLE TEST STRIPS) strip USE ONE NEW STRIP TO TEST BLOOD SUGAR TWICE A DAY AND AS NEEDED 150 strip 1    Accu-Chek FastClix Lancets [facility] USE ONE LANCET TO TEST TWICE A DAY AND AS NEEDED 100 each 1    clopidogrel (PLAVIX) 75 MG tablet TAKE 1 TABLET DAILY 90 tablet 3    ULTICARE SHORT PEN NEEDLES 31G X 8 MM MISC INJECT 1 NEEDLE INTO THE SKIN DAILY 100 each 3    acetaminophen (TYLENOL) 500 MG tablet Take 500 mg by mouth every 6 hours as needed for Pain      nitroGLYCERIN (NITROSTAT) 0.4 MG SL tablet Place 1 tablet under the tongue every 5 minutes as needed for Chest pain 25 tablet 1    Coenzyme Q10 (CO Q-10) 30 MG CAPS Take 30 mg by mouth daily      Ascorbic Acid (VITAMIN C) 250 MG tablet Take 250 mg by mouth daily      ferrous sulfate 325 (65 FE) MG tablet Take 325 mg by mouth daily       Lancet Device MISC 1 Device by Does not apply route once for 1 dose 1 Device 0     No current facility-administered medications for this visit. Allergies   Allergen Reactions    Sulfa Antibiotics Anaphylaxis       Health Maintenance   Topic Date Due    DTaP/Tdap/Td vaccine (1 - Tdap) Never done    Shingles vaccine (1 of 2) Never done    Lipids  02/08/2022    Depression Screen  02/21/2023    Annual Wellness Visit (AWV)  02/22/2023    Flu vaccine  Completed    Pneumococcal 65+ years Vaccine  Completed    COVID-19 Vaccine  Completed    Hepatitis A vaccine  Aged Out    Hib vaccine  Aged Out    Meningococcal (ACWY) vaccine  Aged Out       Subjective:     Review of Systems   Constitutional: Positive for activity change. Negative for appetite change, fatigue and fever. HENT: Positive for rhinorrhea (sinusitis). Negative for congestion, sinus pain and sore throat. Eyes: Negative for pain, discharge and visual disturbance. Respiratory: Positive for shortness of breath (SOB on exertion). Negative for cough and chest tightness. Cardiovascular: Negative for chest pain, palpitations and leg swelling.    Gastrointestinal: Negative for abdominal pain, diarrhea, nausea and vomiting. Endocrine: Negative for polydipsia and polyphagia. Musculoskeletal: Positive for back pain (Lumbar). Negative for arthralgias and myalgias. Neurological: Positive for numbness (bilateral toes). Negative for dizziness, weakness, light-headedness and headaches. Psychiatric/Behavioral: Positive for sleep disturbance (trouble staying asleep). Negative for dysphoric mood. The patient is nervous/anxious (Ativan helps). Objective:     BP (!) 118/52   Pulse 58   Wt 182 lb (82.6 kg)   SpO2 97%   BMI 26.11 kg/m²   Physical Exam  Vitals and nursing note reviewed. Constitutional:       General: He is not in acute distress. Appearance: He is well-developed. He is not ill-appearing. HENT:      Head: Normocephalic and atraumatic. Right Ear: External ear normal.      Left Ear: External ear normal.   Eyes:      General: No scleral icterus. Right eye: No discharge. Left eye: No discharge. Conjunctiva/sclera: Conjunctivae normal.   Neck:      Thyroid: No thyromegaly. Vascular: No carotid bruit. Trachea: No tracheal deviation. Cardiovascular:      Rate and Rhythm: Normal rate and regular rhythm. Heart sounds: Normal heart sounds. Pulmonary:      Effort: Pulmonary effort is normal. No respiratory distress. Breath sounds: Normal breath sounds. No wheezing. Lymphadenopathy:      Cervical: No cervical adenopathy. Skin:     General: Skin is warm. Findings: No rash. Neurological:      Mental Status: He is alert and oriented to person, place, and time. Psychiatric:         Mood and Affect: Mood normal.         Behavior: Behavior normal.         Thought Content: Thought content normal.         Assessment/Plan:   1. Type 2 diabetes mellitus without complication, with long-term current use of insulin ([facility])  Assessment & Plan:   Well-controlled, continue current medications  Orders:  -     POCT glycosylated hemoglobin (Hb A1C)  2. Essential hypertension  Comments:  if increase in lightheadedness or dizziness then will decrease or d/c the amlodipine. 3. Paroxysmal atrial fibrillation (HCC)  Assessment & Plan:   Well-controlled, continue current medications  4. Lumbar spondylolysis  Comments:  try lidocaine patches, try tylenol arthritis in the a.m. Assessment & Plan:   Continue Tylenol as needed  5. Anxiety  Assessment & Plan:   Well-controlled, continue current medications     Return in about 3 months (around 9/1/2022) for DM check, HTN f/u, CAD f/u. Data Unavailable     Orders Placed This Encounter   Procedures    POCT glycosylated hemoglobin (Hb A1C)     No orders of the defined types were placed in this encounter. Patient given educational materials - see patient instructions. Discussed use, benefit, and side effects of prescribed medications. All patient questions answered. Pt voiced understanding. Reviewed health maintenance. Instructed to continue current medications, diet and exercise. Patient agreed with treatment plan. Follow up as directed.      Electronicallysigned by Rhonda Live MD on 6/1/2022 at 9:47 AM

## 2022-06-01 NOTE — PATIENT INSTRUCTIONS
Lidocaine patches daily as needed (aspercreme with lidocaine, or other brands such as salon pas)  Also Tylenol Arthritis every morning.

## 2022-06-06 RX ORDER — CLOPIDOGREL BISULFATE 75 MG/1
TABLET ORAL
Qty: 90 TABLET | Refills: 3 | Status: SHIPPED | OUTPATIENT
Start: 2022-06-06

## 2022-06-06 RX ORDER — ATORVASTATIN CALCIUM 80 MG/1
TABLET, FILM COATED ORAL
Qty: 90 TABLET | Refills: 3 | Status: SHIPPED | OUTPATIENT
Start: 2022-06-06

## 2022-06-18 DIAGNOSIS — E11.9 TYPE 2 DIABETES MELLITUS WITHOUT COMPLICATION, WITH LONG-TERM CURRENT USE OF INSULIN (HCC): ICD-10-CM

## 2022-06-18 DIAGNOSIS — Z79.4 TYPE 2 DIABETES MELLITUS WITHOUT COMPLICATION, WITH LONG-TERM CURRENT USE OF INSULIN (HCC): ICD-10-CM

## 2022-06-20 RX ORDER — LANCETS
EACH MISCELLANEOUS
Qty: 102 EACH | Refills: 3 | Status: SHIPPED | OUTPATIENT
Start: 2022-06-20

## 2022-06-20 RX ORDER — BLOOD SUGAR DIAGNOSTIC
STRIP MISCELLANEOUS
Qty: 150 STRIP | Refills: 1 | Status: SHIPPED | OUTPATIENT
Start: 2022-06-20

## 2022-07-12 DIAGNOSIS — F41.9 ANXIETY: Primary | ICD-10-CM

## 2022-07-13 RX ORDER — LORAZEPAM 0.5 MG/1
TABLET ORAL
Qty: 60 TABLET | Refills: 0 | Status: SHIPPED | OUTPATIENT
Start: 2022-07-13 | End: 2022-07-28

## 2022-08-01 ENCOUNTER — TELEPHONE (OUTPATIENT)
Dept: PRIMARY CARE CLINIC | Age: 87
End: 2022-08-01

## 2022-08-01 NOTE — TELEPHONE ENCOUNTER
----- Message from Jose A Mcfadden sent at 7/29/2022 12:07 PM EDT -----  Subject: Appointment Request    Reason for Call: Established Patient Appointment needed: Routine ED Follow   Up Visit    QUESTIONS    Reason for appointment request? Available appointments did not meet   patient need     Additional Information for Provider? er follow up for chest pain.  he has   appointment on monday at the same time and could not do both but other   appt too far out please follow up asp   ---------------------------------------------------------------------------  --------------  [id number] [employer]  [phone]; OK to leave message on voicemail  ---------------------------------------------------------------------------  --------------  SCRIPT ANSWERS  TREID Screen: Maria De Jesus Navarro

## 2022-08-05 ENCOUNTER — OFFICE VISIT (OUTPATIENT)
Dept: PRIMARY CARE CLINIC | Age: 87
End: 2022-08-05
Payer: MEDICARE

## 2022-08-05 VITALS
SYSTOLIC BLOOD PRESSURE: 118 MMHG | OXYGEN SATURATION: 97 % | HEIGHT: 70 IN | WEIGHT: 180.4 LBS | HEART RATE: 58 BPM | BODY MASS INDEX: 25.83 KG/M2 | DIASTOLIC BLOOD PRESSURE: 70 MMHG

## 2022-08-05 DIAGNOSIS — D64.9 ANEMIA, UNSPECIFIED TYPE: Primary | ICD-10-CM

## 2022-08-05 DIAGNOSIS — R91.1 LUNG NODULE: ICD-10-CM

## 2022-08-05 DIAGNOSIS — E83.42 HYPOMAGNESEMIA: ICD-10-CM

## 2022-08-05 PROCEDURE — G8427 DOCREV CUR MEDS BY ELIG CLIN: HCPCS | Performed by: FAMILY MEDICINE

## 2022-08-05 PROCEDURE — 1123F ACP DISCUSS/DSCN MKR DOCD: CPT | Performed by: FAMILY MEDICINE

## 2022-08-05 PROCEDURE — 1036F TOBACCO NON-USER: CPT | Performed by: FAMILY MEDICINE

## 2022-08-05 PROCEDURE — 99214 OFFICE O/P EST MOD 30 MIN: CPT | Performed by: FAMILY MEDICINE

## 2022-08-05 PROCEDURE — G8417 CALC BMI ABV UP PARAM F/U: HCPCS | Performed by: FAMILY MEDICINE

## 2022-08-05 RX ORDER — MAGNESIUM OXIDE 400 MG/1
400 TABLET ORAL DAILY
Qty: 30 TABLET | Refills: 1 | COMMUNITY
Start: 2022-08-05 | End: 2022-09-01

## 2022-08-05 ASSESSMENT — ENCOUNTER SYMPTOMS
COUGH: 0
SHORTNESS OF BREATH: 1

## 2022-08-05 NOTE — PROGRESS NOTES
[facility] PRIMARY CARE  [address]  [facility] [address]  Dept: [phone]    Travis Erickson is a 80 y.o. male Established patient, who presents today for his medical conditions/complaints as noted below. Chief Complaint   Patient presents with    Follow-up     ER follow up - [facility]- chest pain, sweating, blurred vision, weakness and shaking per pt        HPI:     HPI- pt went to ER with CP. Was sweating and woke him up at 5 a.m. Vision was blurred, heart was racing, and was shakey, but sugar was okay. Was not getting better after about an hour so went in by squad. No heart attack. Pain was coming and going for a day or two before. Felt like he had pulled a muscle, but was more concerned about the blurred vision and shaking. Started feeling better even at home but took him in just in case. No symptoms like that since except for little weakness in his legs.   Having stress test with cardiology in Sept.     Reviewed prior notes: None and Cardiology   Reviewed previous:  Labs, Imaging, and Hospital Records    LDL Cholesterol (mg/dL)   Date Value   01/26/2018 39   10/05/2015 51     LDL Calculated (mg/dL)   Date Value   02/08/2021 51   03/06/2018 54   10/18/2016 70       (goal LDL is <100)   AST (U/L)   Date Value   02/21/2022 22     ALT (U/L)   Date Value   02/21/2022 14     BUN (mg/dL)   Date Value   03/08/2022 27 (H)     Hemoglobin A1C (%)   Date Value   06/01/2022 7.2     TSH (mIU/L)   Date Value   10/04/2015 2.99     BP Readings from Last 3 Encounters:   08/05/22 118/70   06/01/22 (!) 118/52   05/19/22 122/78          (goal 120/80)    Past Medical History:   Diagnosis Date    Acute myocardial ischemia ([address] 12/11/2015    Acute sinusitis 12/11/2015    Cerebral artery occlusion with cerebral infarction [facility])     Chest pain 10/5/2015    Diabetes mellitus ([address]     H/O cataract     Hyperlipidemia     Hypertension     MI (myocardial infarction) ([address] 1985 Neck stiffness 12/11/2015    Partial small bowel obstruction ([address] 11/7/2019    Peyronie's disease     Rotator cuff tendonitis 12/11/2015    TIA (transient ischemic attack) 1998      Past Surgical History:   Procedure Laterality Date    CARDIAC CATHETERIZATION      mulitple caths- 5 stents total    CARDIAC SURGERY  01/2018    3 stents    CATARACT REMOVAL WITH IMPLANT Left     SKIN BIOPSY      forehead       Family History   Problem Relation Age of Onset    Cancer Mother         oral    Stroke Father     High Blood Pressure Brother     Heart Disease Brother     Cancer Maternal Grandmother     Diabetes type 2  Son     Heart Attack Son     Heart Disease Maternal Grandfather     Stroke Paternal Grandmother     Heart Disease Paternal Grandfather         ? Social History     Tobacco Use    Smoking status: Former     Packs/day: 0.50     Years: 8.00     Pack years: 4.00     Types: Cigarettes    Smokeless tobacco: Never   Substance Use Topics    Alcohol use: No      Current Outpatient Medications   Medication Sig Dispense Refill    magnesium oxide (MAG-OX) 400 MG tablet Take 1 tablet by mouth in the morning.  30 tablet 1    ULTICARE SHORT PEN NEEDLES 31G X 8 MM MISC INJECT 1 NEEDLE INTO THE SKIN DAILY 100 each 3    Accu-Chek FastClix Lancets MISC USE ONE LANCET TO TEST TWICE A DAY AND AS NEEDED 102 each 3    blood glucose test strips (FREESTYLE TEST STRIPS) strip USE ONE STRIP TO TEST TWICE A DAY AND AS NEEDED 150 strip 1    atorvastatin (LIPITOR) 80 MG tablet TAKE 1 TABLET NIGHTLY 90 tablet 3    clopidogrel (PLAVIX) 75 MG tablet TAKE 1 TABLET DAILY 90 tablet 3    metoprolol succinate (TOPROL XL) 25 MG extended release tablet Take 0.5 tablets by mouth daily 45 tablet 3    LANTUS SOLOSTAR 100 UNIT/ML injection pen INJECT 15 UNITS INTO THE SKIN NIGHTLY 12 mL 2    XARELTO 20 MG TABS tablet TAKE 1 TABLET DAILY 90 tablet 3    metFORMIN (GLUCOPHAGE-XR) 500 MG extended release tablet Take 3 tablets by mouth daily (with breakfast) 270 tablet 3    amLODIPine (NORVASC) 5 MG tablet TAKE 1 TABLET DAILY 90 tablet 3    lisinopril-hydroCHLOROthiazide (PRINZIDE;ZESTORETIC) 20-12.5 MG per tablet TAKE 1 TABLET DAILY 90 tablet 3    Blood Glucose Monitoring Suppl (FREESTYLE FREEDOM LITE) w/Device KIT 1 kit by Does not apply route daily as needed (check sugars bid and prn) 1 kit 0    acetaminophen (TYLENOL) 500 MG tablet Take 500 mg by mouth every 6 hours as needed for Pain      nitroGLYCERIN (NITROSTAT) 0.4 MG SL tablet Place 1 tablet under the tongue every 5 minutes as needed for Chest pain 25 tablet 1    Coenzyme Q10 (CO Q-10) 30 MG CAPS Take 30 mg by mouth daily      Ascorbic Acid (VITAMIN C) 250 MG tablet Take 250 mg by mouth daily      ferrous sulfate 325 (65 FE) MG tablet Take 325 mg by mouth daily       Lancet Device MISC 1 Device by Does not apply route once for 1 dose 1 Device 0     No current facility-administered medications for this visit. Allergies   Allergen Reactions    Sulfa Antibiotics Anaphylaxis       Health Maintenance   Topic Date Due    DTaP/Tdap/Td vaccine (1 - Tdap) Never done    Shingles vaccine (1 of 2) Never done    Lipids  02/08/2022    Flu vaccine (1) 09/01/2022    Annual Wellness Visit (AWV)  02/22/2023    Depression Screen  06/01/2023    Pneumococcal 65+ years Vaccine  Completed    COVID-19 Vaccine  Completed    Hepatitis A vaccine  Aged Out    Hib vaccine  Aged Out    Meningococcal (ACWY) vaccine  Aged Out       Subjective:      Review of Systems   Constitutional:  Negative for chills and fever. HENT:  Negative for congestion. Respiratory:  Positive for shortness of breath. Negative for cough. Cardiovascular:  Positive for chest pain. Negative for palpitations. Objective:     /70   Pulse 58   Ht 5' 10\" (1.778 m)   Wt 180 lb 6.4 oz (81.8 kg)   SpO2 97%   BMI 25.88 kg/m²   Physical Exam  Vitals and nursing note reviewed. Constitutional:       General: He is not in acute distress. 8/6/2023     Order Specific Question:   Is Patient Fasting? Answer:   no     Order Specific Question:   No of Hours? Answer:   0    Vitamin B12 & Folate     Standing Status:   Future     Standing Expiration Date:   8/6/2023    Ferritin     Standing Status:   Future     Standing Expiration Date:   8/5/2023     Orders Placed This Encounter   Medications    magnesium oxide (MAG-OX) 400 MG tablet     Sig: Take 1 tablet by mouth in the morning. Dispense:  30 tablet     Refill:  1       Patient given educational materials - see patient instructions. Discussed use, benefit, and side effects of prescribed medications. All patient questions answered. Pt voiced understanding. Reviewed health maintenance. Instructed to continue current medications, diet and exercise. Patient agreed with treatment plan. Follow up as directed.      Electronically signed by Ann Ballesteros MD on 8/5/2022 at 11:57 AM

## 2022-08-22 ENCOUNTER — APPOINTMENT (OUTPATIENT)
Dept: GENERAL RADIOLOGY | Age: 87
DRG: 287 | End: 2022-08-22
Payer: MEDICARE

## 2022-08-22 ENCOUNTER — HOSPITAL ENCOUNTER (INPATIENT)
Age: 87
LOS: 7 days | Discharge: HOME | DRG: 287 | End: 2022-08-29
Attending: EMERGENCY MEDICINE | Admitting: INTERNAL MEDICINE
Payer: MEDICARE

## 2022-08-22 DIAGNOSIS — N18.32 STAGE 3B CHRONIC KIDNEY DISEASE (HCC): ICD-10-CM

## 2022-08-22 DIAGNOSIS — I48.91 ATRIAL FIBRILLATION WITH RVR (HCC): Primary | ICD-10-CM

## 2022-08-22 LAB
ABSOLUTE EOS #: 0.15 K/UL (ref 0–0.44)
ABSOLUTE IMMATURE GRANULOCYTE: <0.03 K/UL (ref 0–0.3)
ABSOLUTE LYMPH #: 2.77 K/UL (ref 1.1–3.7)
ABSOLUTE MONO #: 0.79 K/UL (ref 0.1–1.2)
ANION GAP SERPL CALCULATED.3IONS-SCNC: 13 MMOL/L (ref 9–17)
ANION GAP: 11 MMOL/L (ref 7–16)
BASOPHILS # BLD: 0 % (ref 0–2)
BASOPHILS ABSOLUTE: 0.03 K/UL (ref 0–0.2)
BUN BLDV-MCNC: 23 MG/DL (ref 8–23)
CALCIUM SERPL-MCNC: 8.7 MG/DL (ref 8.6–10.4)
CHLORIDE BLD-SCNC: 105 MMOL/L (ref 98–107)
CO2: 20 MMOL/L (ref 20–31)
CREAT SERPL-MCNC: 1.34 MG/DL (ref 0.7–1.2)
EOSINOPHILS RELATIVE PERCENT: 2 % (ref 1–4)
GFR AFRICAN AMERICAN: >60 ML/MIN
GFR NON-AFRICAN AMERICAN: 50 ML/MIN
GFR NON-AFRICAN AMERICAN: 51 ML/MIN
GFR SERPL CREATININE-BSD FRML MDRD: >60 ML/MIN
GFR SERPL CREATININE-BSD FRML MDRD: ABNORMAL ML/MIN/{1.73_M2}
GFR SERPL CREATININE-BSD FRML MDRD: ABNORMAL ML/MIN/{1.73_M2}
GLUCOSE BLD-MCNC: 235 MG/DL (ref 74–100)
GLUCOSE BLD-MCNC: 238 MG/DL (ref 70–99)
GLUCOSE BLD-MCNC: 253 MG/DL (ref 75–110)
HCO3 VENOUS: 20.6 MMOL/L (ref 22–29)
HCT VFR BLD CALC: 33.2 % (ref 40.7–50.3)
HEMOGLOBIN: 10.8 G/DL (ref 13–17)
IMMATURE GRANULOCYTES: 0 %
LYMPHOCYTES # BLD: 41 % (ref 24–43)
MCH RBC QN AUTO: 30.8 PG (ref 25.2–33.5)
MCHC RBC AUTO-ENTMCNC: 32.5 G/DL (ref 28.4–34.8)
MCV RBC AUTO: 94.6 FL (ref 82.6–102.9)
MONOCYTES # BLD: 12 % (ref 3–12)
NEGATIVE BASE EXCESS, VEN: 4 (ref 0–2)
NRBC AUTOMATED: 0 PER 100 WBC
O2 SAT, VEN: 81 % (ref 60–85)
PARTIAL THROMBOPLASTIN TIME: 28 SEC (ref 20.5–30.5)
PCO2, VEN: 33.9 MM HG (ref 41–51)
PDW BLD-RTO: 12.5 % (ref 11.8–14.4)
PH VENOUS: 7.39 (ref 7.32–7.43)
PLATELET # BLD: 186 K/UL (ref 138–453)
PMV BLD AUTO: 11 FL (ref 8.1–13.5)
PO2, VEN: 45.3 MM HG (ref 30–50)
POC BUN: 21 MG/DL (ref 8–26)
POC CHLORIDE: 109 MMOL/L (ref 98–107)
POC CREATININE: 1.36 MG/DL (ref 0.51–1.19)
POC HEMATOCRIT: 30 % (ref 41–53)
POC HEMOGLOBIN: 10.3 G/DL (ref 13.5–17.5)
POC IONIZED CALCIUM: 1.2 MMOL/L (ref 1.15–1.33)
POC LACTIC ACID: 1.58 MMOL/L (ref 0.56–1.39)
POC POTASSIUM: 4.6 MMOL/L (ref 3.5–4.5)
POC SODIUM: 140 MMOL/L (ref 138–146)
POC TCO2: 21 MMOL/L (ref 22–30)
POTASSIUM SERPL-SCNC: 4.8 MMOL/L (ref 3.7–5.3)
PRO-BNP: 1009 PG/ML
RBC # BLD: 3.51 M/UL (ref 4.21–5.77)
SEG NEUTROPHILS: 45 % (ref 36–65)
SEGMENTED NEUTROPHILS ABSOLUTE COUNT: 2.98 K/UL (ref 1.5–8.1)
SODIUM BLD-SCNC: 138 MMOL/L (ref 135–144)
TROPONIN, HIGH SENSITIVITY: 13 NG/L (ref 0–22)
TROPONIN, HIGH SENSITIVITY: 28 NG/L (ref 0–22)
WBC # BLD: 6.7 K/UL (ref 3.5–11.3)

## 2022-08-22 PROCEDURE — 85014 HEMATOCRIT: CPT

## 2022-08-22 PROCEDURE — 82565 ASSAY OF CREATININE: CPT

## 2022-08-22 PROCEDURE — 82330 ASSAY OF CALCIUM: CPT

## 2022-08-22 PROCEDURE — 93005 ELECTROCARDIOGRAM TRACING: CPT | Performed by: STUDENT IN AN ORGANIZED HEALTH CARE EDUCATION/TRAINING PROGRAM

## 2022-08-22 PROCEDURE — 2580000003 HC RX 258: Performed by: STUDENT IN AN ORGANIZED HEALTH CARE EDUCATION/TRAINING PROGRAM

## 2022-08-22 PROCEDURE — 2060000000 HC ICU INTERMEDIATE R&B

## 2022-08-22 PROCEDURE — 80051 ELECTROLYTE PANEL: CPT

## 2022-08-22 PROCEDURE — 84484 ASSAY OF TROPONIN QUANT: CPT

## 2022-08-22 PROCEDURE — 99285 EMERGENCY DEPT VISIT HI MDM: CPT

## 2022-08-22 PROCEDURE — 82947 ASSAY GLUCOSE BLOOD QUANT: CPT

## 2022-08-22 PROCEDURE — 82803 BLOOD GASES ANY COMBINATION: CPT

## 2022-08-22 PROCEDURE — 83880 ASSAY OF NATRIURETIC PEPTIDE: CPT

## 2022-08-22 PROCEDURE — 80048 BASIC METABOLIC PNL TOTAL CA: CPT

## 2022-08-22 PROCEDURE — 85025 COMPLETE CBC W/AUTO DIFF WBC: CPT

## 2022-08-22 PROCEDURE — 6360000002 HC RX W HCPCS: Performed by: STUDENT IN AN ORGANIZED HEALTH CARE EDUCATION/TRAINING PROGRAM

## 2022-08-22 PROCEDURE — 84520 ASSAY OF UREA NITROGEN: CPT

## 2022-08-22 PROCEDURE — 71045 X-RAY EXAM CHEST 1 VIEW: CPT

## 2022-08-22 PROCEDURE — 96375 TX/PRO/DX INJ NEW DRUG ADDON: CPT

## 2022-08-22 PROCEDURE — 85730 THROMBOPLASTIN TIME PARTIAL: CPT

## 2022-08-22 PROCEDURE — 96374 THER/PROPH/DIAG INJ IV PUSH: CPT

## 2022-08-22 PROCEDURE — 6360000002 HC RX W HCPCS

## 2022-08-22 PROCEDURE — 83605 ASSAY OF LACTIC ACID: CPT

## 2022-08-22 RX ORDER — ACETAMINOPHEN 325 MG/1
650 TABLET ORAL EVERY 6 HOURS PRN
Status: DISCONTINUED | OUTPATIENT
Start: 2022-08-22 | End: 2022-08-29 | Stop reason: HOSPADM

## 2022-08-22 RX ORDER — HEPARIN SODIUM 1000 [USP'U]/ML
60 INJECTION, SOLUTION INTRAVENOUS; SUBCUTANEOUS PRN
Status: DISCONTINUED | OUTPATIENT
Start: 2022-08-22 | End: 2022-08-22

## 2022-08-22 RX ORDER — ACETAMINOPHEN 650 MG/1
650 SUPPOSITORY RECTAL EVERY 6 HOURS PRN
Status: DISCONTINUED | OUTPATIENT
Start: 2022-08-22 | End: 2022-08-29 | Stop reason: HOSPADM

## 2022-08-22 RX ORDER — SODIUM CHLORIDE 0.9 % (FLUSH) 0.9 %
5-40 SYRINGE (ML) INJECTION EVERY 12 HOURS SCHEDULED
Status: DISCONTINUED | OUTPATIENT
Start: 2022-08-22 | End: 2022-08-29 | Stop reason: HOSPADM

## 2022-08-22 RX ORDER — HEPARIN SODIUM 1000 [USP'U]/ML
30 INJECTION, SOLUTION INTRAVENOUS; SUBCUTANEOUS PRN
Status: DISCONTINUED | OUTPATIENT
Start: 2022-08-22 | End: 2022-08-22

## 2022-08-22 RX ORDER — DEXTROSE MONOHYDRATE 100 MG/ML
INJECTION, SOLUTION INTRAVENOUS CONTINUOUS PRN
Status: DISCONTINUED | OUTPATIENT
Start: 2022-08-22 | End: 2022-08-29 | Stop reason: HOSPADM

## 2022-08-22 RX ORDER — POLYETHYLENE GLYCOL 3350 17 G/17G
17 POWDER, FOR SOLUTION ORAL DAILY PRN
Status: DISCONTINUED | OUTPATIENT
Start: 2022-08-22 | End: 2022-08-29 | Stop reason: HOSPADM

## 2022-08-22 RX ORDER — INSULIN GLARGINE 100 [IU]/ML
15 INJECTION, SOLUTION SUBCUTANEOUS NIGHTLY
Status: DISCONTINUED | OUTPATIENT
Start: 2022-08-22 | End: 2022-08-29 | Stop reason: HOSPADM

## 2022-08-22 RX ORDER — SODIUM CHLORIDE 0.9 % (FLUSH) 0.9 %
10 SYRINGE (ML) INJECTION PRN
Status: DISCONTINUED | OUTPATIENT
Start: 2022-08-22 | End: 2022-08-29 | Stop reason: HOSPADM

## 2022-08-22 RX ORDER — ONDANSETRON 2 MG/ML
4 INJECTION INTRAMUSCULAR; INTRAVENOUS ONCE
Status: COMPLETED | OUTPATIENT
Start: 2022-08-22 | End: 2022-08-22

## 2022-08-22 RX ORDER — HEPARIN SODIUM 1000 [USP'U]/ML
4000 INJECTION, SOLUTION INTRAVENOUS; SUBCUTANEOUS PRN
Status: DISCONTINUED | OUTPATIENT
Start: 2022-08-22 | End: 2022-08-24 | Stop reason: ALTCHOICE

## 2022-08-22 RX ORDER — INSULIN LISPRO 100 [IU]/ML
0-4 INJECTION, SOLUTION INTRAVENOUS; SUBCUTANEOUS NIGHTLY
Status: DISCONTINUED | OUTPATIENT
Start: 2022-08-22 | End: 2022-08-29 | Stop reason: HOSPADM

## 2022-08-22 RX ORDER — HEPARIN SODIUM 1000 [USP'U]/ML
2000 INJECTION, SOLUTION INTRAVENOUS; SUBCUTANEOUS PRN
Status: DISCONTINUED | OUTPATIENT
Start: 2022-08-22 | End: 2022-08-24 | Stop reason: ALTCHOICE

## 2022-08-22 RX ORDER — ONDANSETRON 2 MG/ML
4 INJECTION INTRAMUSCULAR; INTRAVENOUS EVERY 6 HOURS PRN
Status: DISCONTINUED | OUTPATIENT
Start: 2022-08-22 | End: 2022-08-29 | Stop reason: HOSPADM

## 2022-08-22 RX ORDER — MAGNESIUM SULFATE IN WATER 40 MG/ML
INJECTION, SOLUTION INTRAVENOUS
Status: COMPLETED
Start: 2022-08-22 | End: 2022-08-22

## 2022-08-22 RX ORDER — HEPARIN SODIUM AND DEXTROSE 10000; 5 [USP'U]/100ML; G/100ML
5-30 INJECTION INTRAVENOUS CONTINUOUS
Status: DISPENSED | OUTPATIENT
Start: 2022-08-22 | End: 2022-08-23

## 2022-08-22 RX ORDER — INSULIN LISPRO 100 [IU]/ML
0-8 INJECTION, SOLUTION INTRAVENOUS; SUBCUTANEOUS
Status: DISCONTINUED | OUTPATIENT
Start: 2022-08-23 | End: 2022-08-29 | Stop reason: HOSPADM

## 2022-08-22 RX ORDER — ONDANSETRON 4 MG/1
4 TABLET, ORALLY DISINTEGRATING ORAL EVERY 8 HOURS PRN
Status: DISCONTINUED | OUTPATIENT
Start: 2022-08-22 | End: 2022-08-29 | Stop reason: HOSPADM

## 2022-08-22 RX ORDER — MAGNESIUM SULFATE IN WATER 40 MG/ML
2000 INJECTION, SOLUTION INTRAVENOUS ONCE
Status: COMPLETED | OUTPATIENT
Start: 2022-08-22 | End: 2022-08-22

## 2022-08-22 RX ORDER — AMLODIPINE BESYLATE 5 MG/1
5 TABLET ORAL DAILY
Status: DISCONTINUED | OUTPATIENT
Start: 2022-08-23 | End: 2022-08-29 | Stop reason: HOSPADM

## 2022-08-22 RX ORDER — CLOPIDOGREL BISULFATE 75 MG/1
75 TABLET ORAL DAILY
Status: DISCONTINUED | OUTPATIENT
Start: 2022-08-23 | End: 2022-08-26

## 2022-08-22 RX ORDER — SODIUM CHLORIDE 9 MG/ML
INJECTION, SOLUTION INTRAVENOUS PRN
Status: DISCONTINUED | OUTPATIENT
Start: 2022-08-22 | End: 2022-08-29 | Stop reason: HOSPADM

## 2022-08-22 RX ORDER — HEPARIN SODIUM 1000 [USP'U]/ML
4000 INJECTION, SOLUTION INTRAVENOUS; SUBCUTANEOUS ONCE
Status: COMPLETED | OUTPATIENT
Start: 2022-08-22 | End: 2022-08-22

## 2022-08-22 RX ORDER — HEPARIN SODIUM AND DEXTROSE 10000; 5 [USP'U]/100ML; G/100ML
5-30 INJECTION INTRAVENOUS CONTINUOUS
Status: DISCONTINUED | OUTPATIENT
Start: 2022-08-22 | End: 2022-08-22

## 2022-08-22 RX ORDER — ATORVASTATIN CALCIUM 80 MG/1
80 TABLET, FILM COATED ORAL NIGHTLY
Status: DISCONTINUED | OUTPATIENT
Start: 2022-08-22 | End: 2022-08-29 | Stop reason: HOSPADM

## 2022-08-22 RX ORDER — METOPROLOL SUCCINATE 25 MG/1
12.5 TABLET, EXTENDED RELEASE ORAL DAILY
Status: DISCONTINUED | OUTPATIENT
Start: 2022-08-23 | End: 2022-08-29 | Stop reason: HOSPADM

## 2022-08-22 RX ADMIN — HEPARIN SODIUM 4000 UNITS: 1000 INJECTION INTRAVENOUS; SUBCUTANEOUS at 22:11

## 2022-08-22 RX ADMIN — MAGNESIUM SULFATE IN WATER 2000 MG: 40 INJECTION, SOLUTION INTRAVENOUS at 20:45

## 2022-08-22 RX ADMIN — ONDANSETRON 4 MG: 2 INJECTION INTRAMUSCULAR; INTRAVENOUS at 20:57

## 2022-08-22 RX ADMIN — AMIODARONE HYDROCHLORIDE 150 MG: 50 INJECTION, SOLUTION INTRAVENOUS at 20:49

## 2022-08-22 RX ADMIN — AMIODARONE HYDROCHLORIDE 1 MG/MIN: 50 INJECTION, SOLUTION INTRAVENOUS at 21:01

## 2022-08-22 RX ADMIN — HEPARIN SODIUM AND DEXTROSE 12 UNITS/KG/HR: 10000; 5 INJECTION INTRAVENOUS at 22:11

## 2022-08-22 RX ADMIN — MAGNESIUM SULFATE HEPTAHYDRATE 2000 MG: 40 INJECTION, SOLUTION INTRAVENOUS at 20:45

## 2022-08-22 ASSESSMENT — ENCOUNTER SYMPTOMS
RHINORRHEA: 0
ABDOMINAL PAIN: 0
VOMITING: 0
NAUSEA: 1
DIARRHEA: 0
SHORTNESS OF BREATH: 1
BACK PAIN: 0

## 2022-08-23 PROBLEM — E87.20 LACTIC ACID ACIDOSIS: Status: ACTIVE | Noted: 2022-08-23

## 2022-08-23 PROBLEM — N18.30 CKD (CHRONIC KIDNEY DISEASE) STAGE 3, GFR 30-59 ML/MIN (HCC): Status: ACTIVE | Noted: 2022-08-23

## 2022-08-23 LAB
ABSOLUTE EOS #: 0.08 K/UL (ref 0–0.44)
ABSOLUTE IMMATURE GRANULOCYTE: <0.03 K/UL (ref 0–0.3)
ABSOLUTE LYMPH #: 1.95 K/UL (ref 1.1–3.7)
ABSOLUTE MONO #: 0.69 K/UL (ref 0.1–1.2)
ALBUMIN SERPL-MCNC: 3.7 G/DL (ref 3.5–5.2)
ALBUMIN/GLOBULIN RATIO: 1.5 (ref 1–2.5)
ALP BLD-CCNC: 83 U/L (ref 40–129)
ALT SERPL-CCNC: 12 U/L (ref 5–41)
ANION GAP SERPL CALCULATED.3IONS-SCNC: 10 MMOL/L (ref 9–17)
AST SERPL-CCNC: 22 U/L
BASOPHILS # BLD: 1 % (ref 0–2)
BASOPHILS ABSOLUTE: 0.03 K/UL (ref 0–0.2)
BILIRUB SERPL-MCNC: 0.26 MG/DL (ref 0.3–1.2)
BUN BLDV-MCNC: 21 MG/DL (ref 8–23)
CALCIUM SERPL-MCNC: 8.4 MG/DL (ref 8.6–10.4)
CHLORIDE BLD-SCNC: 106 MMOL/L (ref 98–107)
CHP ED QC CHECK: NORMAL
CHP ED QC CHECK: YES
CO2: 22 MMOL/L (ref 20–31)
CREAT SERPL-MCNC: 1.37 MG/DL (ref 0.7–1.2)
EOSINOPHILS RELATIVE PERCENT: 1 % (ref 1–4)
GFR AFRICAN AMERICAN: 60 ML/MIN
GFR NON-AFRICAN AMERICAN: 49 ML/MIN
GFR SERPL CREATININE-BSD FRML MDRD: ABNORMAL ML/MIN/{1.73_M2}
GLUCOSE BLD-MCNC: 130 MG/DL
GLUCOSE BLD-MCNC: 130 MG/DL (ref 75–110)
GLUCOSE BLD-MCNC: 131 MG/DL (ref 75–110)
GLUCOSE BLD-MCNC: 171 MG/DL (ref 70–99)
GLUCOSE BLD-MCNC: 207 MG/DL (ref 75–110)
GLUCOSE BLD-MCNC: 234 MG/DL
GLUCOSE BLD-MCNC: 234 MG/DL (ref 75–110)
HCT VFR BLD CALC: 30 % (ref 40.7–50.3)
HEMOGLOBIN: 10 G/DL (ref 13–17)
IMMATURE GRANULOCYTES: 0 %
INR BLD: 1.1
LYMPHOCYTES # BLD: 31 % (ref 24–43)
MAGNESIUM: 2.2 MG/DL (ref 1.6–2.6)
MCH RBC QN AUTO: 31 PG (ref 25.2–33.5)
MCHC RBC AUTO-ENTMCNC: 33.3 G/DL (ref 28.4–34.8)
MCV RBC AUTO: 92.9 FL (ref 82.6–102.9)
MONOCYTES # BLD: 11 % (ref 3–12)
NRBC AUTOMATED: 0 PER 100 WBC
PARTIAL THROMBOPLASTIN TIME: 107.1 SEC (ref 20.5–30.5)
PARTIAL THROMBOPLASTIN TIME: 35.8 SEC (ref 20.5–30.5)
PDW BLD-RTO: 12.3 % (ref 11.8–14.4)
PLATELET # BLD: 167 K/UL (ref 138–453)
PMV BLD AUTO: 10.8 FL (ref 8.1–13.5)
POTASSIUM SERPL-SCNC: 4.8 MMOL/L (ref 3.7–5.3)
PRO-BNP: 1706 PG/ML
PROTHROMBIN TIME: 11.4 SEC (ref 9.1–12.3)
RBC # BLD: 3.23 M/UL (ref 4.21–5.77)
REASON FOR REJECTION: NORMAL
SEG NEUTROPHILS: 56 % (ref 36–65)
SEGMENTED NEUTROPHILS ABSOLUTE COUNT: 3.6 K/UL (ref 1.5–8.1)
SODIUM BLD-SCNC: 138 MMOL/L (ref 135–144)
TOTAL PROTEIN: 6.2 G/DL (ref 6.4–8.3)
TROPONIN, HIGH SENSITIVITY: 97 NG/L (ref 0–22)
WBC # BLD: 6.4 K/UL (ref 3.5–11.3)
ZZ NTE CLEAN UP: ORDERED TEST: NORMAL
ZZ NTE WITH NAME CLEAN UP: SPECIMEN SOURCE: NORMAL

## 2022-08-23 PROCEDURE — 99223 1ST HOSP IP/OBS HIGH 75: CPT | Performed by: INTERNAL MEDICINE

## 2022-08-23 PROCEDURE — 6370000000 HC RX 637 (ALT 250 FOR IP): Performed by: INTERNAL MEDICINE

## 2022-08-23 PROCEDURE — 2580000003 HC RX 258: Performed by: NURSE PRACTITIONER

## 2022-08-23 PROCEDURE — 2060000000 HC ICU INTERMEDIATE R&B

## 2022-08-23 PROCEDURE — 6360000002 HC RX W HCPCS: Performed by: STUDENT IN AN ORGANIZED HEALTH CARE EDUCATION/TRAINING PROGRAM

## 2022-08-23 PROCEDURE — 6370000000 HC RX 637 (ALT 250 FOR IP): Performed by: NURSE PRACTITIONER

## 2022-08-23 PROCEDURE — 84484 ASSAY OF TROPONIN QUANT: CPT

## 2022-08-23 PROCEDURE — 83880 ASSAY OF NATRIURETIC PEPTIDE: CPT

## 2022-08-23 PROCEDURE — 83735 ASSAY OF MAGNESIUM: CPT

## 2022-08-23 PROCEDURE — 85610 PROTHROMBIN TIME: CPT

## 2022-08-23 PROCEDURE — 80053 COMPREHEN METABOLIC PANEL: CPT

## 2022-08-23 PROCEDURE — 85025 COMPLETE CBC W/AUTO DIFF WBC: CPT

## 2022-08-23 PROCEDURE — 6360000002 HC RX W HCPCS: Performed by: NURSE PRACTITIONER

## 2022-08-23 PROCEDURE — 85730 THROMBOPLASTIN TIME PARTIAL: CPT

## 2022-08-23 PROCEDURE — 82947 ASSAY GLUCOSE BLOOD QUANT: CPT

## 2022-08-23 RX ORDER — AMIODARONE HYDROCHLORIDE 200 MG/1
200 TABLET ORAL DAILY
Status: DISCONTINUED | OUTPATIENT
Start: 2022-08-30 | End: 2022-08-27

## 2022-08-23 RX ORDER — ASPIRIN 81 MG/1
81 TABLET, CHEWABLE ORAL DAILY
Status: DISCONTINUED | OUTPATIENT
Start: 2022-08-23 | End: 2022-08-23

## 2022-08-23 RX ORDER — CLOPIDOGREL BISULFATE 75 MG/1
75 TABLET ORAL DAILY
Status: DISCONTINUED | OUTPATIENT
Start: 2022-08-23 | End: 2022-08-29 | Stop reason: HOSPADM

## 2022-08-23 RX ORDER — AMIODARONE HYDROCHLORIDE 200 MG/1
200 TABLET ORAL 2 TIMES DAILY
Status: DISCONTINUED | OUTPATIENT
Start: 2022-08-23 | End: 2022-08-27

## 2022-08-23 RX ADMIN — AMIODARONE HYDROCHLORIDE 200 MG: 200 TABLET ORAL at 12:01

## 2022-08-23 RX ADMIN — INSULIN GLARGINE 15 UNITS: 100 INJECTION, SOLUTION SUBCUTANEOUS at 03:05

## 2022-08-23 RX ADMIN — AMLODIPINE BESYLATE 5 MG: 5 TABLET ORAL at 09:43

## 2022-08-23 RX ADMIN — AMIODARONE HYDROCHLORIDE 0.5 MG/MIN: 50 INJECTION, SOLUTION INTRAVENOUS at 08:10

## 2022-08-23 RX ADMIN — AMIODARONE HYDROCHLORIDE 0.5 MG/MIN: 50 INJECTION, SOLUTION INTRAVENOUS at 03:04

## 2022-08-23 RX ADMIN — SODIUM CHLORIDE, PRESERVATIVE FREE 10 ML: 5 INJECTION INTRAVENOUS at 21:04

## 2022-08-23 RX ADMIN — RIVAROXABAN 20 MG: 20 TABLET, FILM COATED ORAL at 21:10

## 2022-08-23 RX ADMIN — HEPARIN SODIUM 2000 UNITS: 1000 INJECTION INTRAVENOUS; SUBCUTANEOUS at 13:06

## 2022-08-23 RX ADMIN — INSULIN GLARGINE 15 UNITS: 100 INJECTION, SOLUTION SUBCUTANEOUS at 21:05

## 2022-08-23 RX ADMIN — HEPARIN SODIUM AND DEXTROSE 8 UNITS/KG/HR: 10000; 5 INJECTION INTRAVENOUS at 06:04

## 2022-08-23 RX ADMIN — AMIODARONE HYDROCHLORIDE 200 MG: 200 TABLET ORAL at 21:01

## 2022-08-23 RX ADMIN — CLOPIDOGREL BISULFATE 75 MG: 75 TABLET ORAL at 09:43

## 2022-08-23 ASSESSMENT — PAIN SCALES - GENERAL
PAINLEVEL_OUTOF10: 0
PAINLEVEL_OUTOF10: 0

## 2022-08-23 NOTE — CARE COORDINATION
08/23/22 1054   Service Assessment   Patient Orientation Alert and Oriented   Cognition Alert   History Provided By Patient   Primary [address]  Family Members;Spouse/Significant Other   PCP Verified by CM Yes   Last Visit to PCP Within last 3 months   Prior Functional Level Independent in ADLs/IADLs   Current Functional Level Independent in ADLs/IADLs   Can patient return to prior living arrangement Yes   Ability to make needs known: Good   Family able to assist with home care needs: Yes   Would you like for me to discuss the discharge plan with any other family members/significant others, and if so, who? No   Social/Functional History   Lives With Spouse   Type of [address] One level   Home Access Stairs to enter with rails   Entrance Stairs - Number of Steps 2   Entrance Stairs - Rails Both   Bathroom Shower/Tub Tub/Shower unit   Bathroom Toilet Standard   ADL Assistance Independent   Homemaking Assistance Independent   Homemaking Responsibilities Yes   Ambulation Assistance Independent   Transfer Assistance Independent   Active  Yes   Mode of Transportation Car;Family   Occupation Retired   Discharge Planning   Type of [address] Prior To Admission None   Potential [address] Medications No   Type of [address] None   One/Two Story Residence One story   History of falls? 0   Services At/After Discharge   Services At/After Discharge None   [employer] Information Provided?  Yes   Mode of Transport at Discharge Self   Confirm Follow Up Transport Family

## 2022-08-23 NOTE — ED NOTES
Report given to [address] [place], [place] [address] nurse   Awaiting bed to be clean     Yancy Grady, CAROL  08/23/22 1514

## 2022-08-23 NOTE — CONSULTS
[facility]   Consult Note         Today's Date: 8/23/2022  Patient Name: Tristan Dominguez  Date of admission: 8/22/2022  8:22 PM  Patient's age: 80 y. o., 1935  Admission Dx: Atrial fibrillation with RVR ([facility] [I48.91]    Reason for Consult:  Cardiac evaluation    Requesting Physician: No admitting provider for patient encounter. REASON FOR CONSULT: A. fib with RVR and chest pain    History Obtained From:  Patient, chart, staff, records    HISTORY OF PRESENT ILLNESS:      The patient is a 80 y.o. male who is admitted to the hospital with chest pain. According to the patient he had his dinner yesterday and then he was walking to restroom and developed chest pain. It was mainly in the center of the chest, feeling like a pressure, radiating to the left upper limb, associated with some nausea and mild sweating. EMS was called and upon the arrival of the EMS patient was found to be tachycardic with some runs of V. tach and patient was started on amiodarone drip. Patient was also given nitro and aspirin by the EMS and was brought into the emergency department. Patient was feeling chest pain when he arrived in the emergency department. Patient was given 2 g of magnesium with a bolus of amiodarone. Patient heart rate improved and according to the patient his chest and subsided after that. Patient was started on heparin drip in the emergency department. On my evaluation patient is chest pain pain-free and on heparin drip. Vitals are otherwise stable. Past Medical History:   has a past medical history of Acute myocardial ischemia ([address] Acute sinusitis, Cerebral artery occlusion with cerebral infarction [facility]), Chest pain, Diabetes mellitus ([address] H/O cataract, Hyperlipidemia, Hypertension, MI (myocardial infarction) ([address] Neck stiffness, Partial small bowel obstruction ([address] Peyronie's disease, Rotator cuff tendonitis, and TIA (transient ischemic attack).     Past Surgical History: has a past surgical history that includes Cardiac surgery (01/2018); Cardiac catheterization; Cataract removal with implant (Left); and skin biopsy. Home Medications:    Prior to Admission medications    Medication Sig Start Date End Date Taking? Authorizing Provider   magnesium oxide (MAG-OX) 400 MG tablet Take 1 tablet by mouth in the morning.  8/5/22   Soren Mata MD   ULTICARE SHORT PEN NEEDLES 31G X 8 MM MISC INJECT 1 NEEDLE INTO THE SKIN DAILY 7/20/22   Soren Mata MD   Accu-Chek FastClix Lancets MISC USE ONE LANCET TO TEST TWICE A DAY AND AS NEEDED 6/20/22   Unique Pierre PA-C   blood glucose test strips (FREESTYLE TEST STRIPS) strip USE ONE STRIP TO TEST TWICE A DAY AND AS NEEDED 6/20/22   Unique Pierre PA-C   atorvastatin (LIPITOR) 80 MG tablet TAKE 1 TABLET NIGHTLY 6/6/22   Soren Mata MD   clopidogrel (PLAVIX) 75 MG tablet TAKE 1 TABLET DAILY 6/6/22   Soren Mata MD   metoprolol succinate (TOPROL XL) 25 MG extended release tablet Take 0.5 tablets by mouth daily 5/17/22   Soren Mata MD   LANTUS SOLOSTAR 100 UNIT/ML injection pen INJECT 15 UNITS INTO THE SKIN NIGHTLY 4/6/22   Soren Mata MD   XARELTO 20 MG TABS tablet TAKE 1 TABLET DAILY 4/4/22   Soren Mata MD   metFORMIN (GLUCOPHAGE-XR) 500 MG extended release tablet Take 3 tablets by mouth daily (with breakfast) 2/21/22   Soren Mata MD   amLODIPine (NORVASC) 5 MG tablet TAKE 1 TABLET DAILY 12/3/21   Soren Mata MD   lisinopril-hydroCHLOROthiazide (PRINZIDE;ZESTORETIC) 20-12.5 MG per tablet TAKE 1 TABLET DAILY 12/3/21   Soren Mata MD   Blood Glucose Monitoring Suppl (FREESTYLE FREEDOM LITE) w/Device KIT 1 kit by Does not apply route daily as needed (check sugars bid and prn) 11/22/21   Soren Mata MD   acetaminophen (TYLENOL) 500 MG tablet Take 500 mg by mouth every 6 hours as needed for Pain    Historical Provider, MD   Lancet Device MISC 1 Device by Does not apply route once for 1 dose 2/18/19 2/21/22  Morteza Monterroso MD   nitroGLYCERIN (NITROSTAT) 0.4 MG SL tablet Place 1 tablet under the tongue every 5 minutes as needed for Chest pain 1/28/18   Brando Magallon MD   Coenzyme Q10 (CO Q-10) 30 MG CAPS Take 30 mg by mouth daily    Historical Provider, MD   Ascorbic Acid (VITAMIN C) 250 MG tablet Take 500 mg by mouth daily    Historical Provider, MD   ferrous sulfate 325 (65 FE) MG tablet Take 325 mg by mouth daily     Historical Provider, MD       amLODIPine, 5 mg, Oral, Daily    atorvastatin, 80 mg, Oral, Nightly    clopidogrel, 75 mg, Oral, Daily    insulin glargine, 15 Units, SubCUTAneous, Nightly    metoprolol succinate, 12.5 mg, Oral, Daily    sodium chloride flush, 5-40 mL, IntraVENous, 2 times per day    insulin lispro, 0-8 Units, SubCUTAneous, TID WC    insulin lispro, 0-4 Units, SubCUTAneous, Nightly      Allergies:  Sulfa antibiotics    Social History:   reports that he has quit smoking. He has a 4.00 pack-year smoking history. He has never used smokeless tobacco. He reports that he does not drink alcohol and does not use drugs. Family History: family history includes Cancer in his maternal grandmother and mother; Diabetes type 2  in his son; Heart Attack in his son; Heart Disease in his brother, maternal grandfather, and paternal grandfather; High Blood Pressure in his brother; Stroke in his father and paternal grandmother. No h/o sudden cardiac death. REVIEW OF SYSTEMS:    Constitutional: there has been no unanticipated weight loss. There's been No change in energy level, No change in activity level. Eyes: No visual changes or diplopia. No scleral icterus. ENT: No Headaches, hearing loss or vertigo. No mouth sores or sore throat. Cardiovascular: per HPI  Respiratory: per HPI  Gastrointestinal: No abdominal pain, appetite loss, blood in stools. No change in bowel or bladder habits.   Genitourinary: No dysuria, trouble voiding, or hematuria. Musculoskeletal:  No gait disturbance, No weakness or joint complaints. Integumentary: No rash or pruritis. Neurological: No headache, diplopia, change in muscle strength, numbness or tingling. No change in gait, balance, coordination, mood, affect, memory, mentation, behavior  Psychiatric: No anxiety, or depression. PHYSICAL EXAM:      BP (!) 127/57   Pulse 50   Temp 98.3 °F (36.8 °C) (Oral)   Resp 16   Wt 180 lb (81.6 kg)   SpO2 93%   BMI 25.83 kg/m²    Constitutional and General Appearance: alert, cooperative, no distress and appears stated age  HEENT: PERRL, no cervical lymphadenopathy. No masses palpable. Normal oral mucosa  Respiratory:  Normal excursion and expansion without use of accessory muscles  Resp Auscultation: Good respiratory effort. No for increased work of breathing. On auscultation: clear to auscultation bilaterally  Cardiovascular: The apical impulse is not displaced  Heart tones are crisp and normal. regular S1 and S2.  Jugular venous pulsation Normal  The carotid upstroke is normal in amplitude and contour without delay or bruit  Peripheral pulses are symmetrical and full   Abdomen:   No masses or tenderness  Bowel sounds present  Extremities:   No Cyanosis or Clubbing   Lower extremity edema: No   Skin: Warm and dry  Neurological:  Alert and oriented. Moves all extremities well          EKG:    Date: 08/23/22  Reading: No acute ischemia  NSR with T wave [id number] in lateral leads    LAST ECHO:  Date: 01/26/2018  Findings Summary:  Left ventricle is normal in size. Global left ventricular systolic function  is low normal. Estimated ejection fraction is 50 % . Normal right ventricular size and function. No significant valvular regurgitation or stenosis seen.     LAST Stress Test:   Date of last ST:  Major Findings:    LAST Cardiac Angiography:.  Date:  Findings:  Cardiac Arteries and Lesion Findings     LMCA: 20-30% distal lesion LAD: 40% mid lesion     LCx: Mild irregularities 10-20%. 70% proximal OM 1 lesion and 99% OM 2  lesion, both <2.0 caliber vessels     RCA: 95% proximal lesion, patent mid stent and patent ostial PDA stent, 70%  PDA lesion and 99% RPL lesion <2.0 mm vessel       Lesion on Prox RCA: Proximal subsection. 95% stenosis 60 mm length reduced    to 0%. Pre procedure MARK III flow was noted. Post Procedure MARK III    flow was present. Good runoff was present. The lesion was diagnosed as    High Risk (C). Devices used       - Savvify Flex 180 cm. Number of passes: 1.       - Trek Balloon 2.5mm x 12mm. 1 inflation(s) to a max pressure of: 16    antonia. - 6 fr GuideLiner. Number of passes: 1.       - Xience Alpine 3.0 x 38 RAZA. 1 inflation(s) to a max pressure of: 16    antonia. - Xience Alpine 3.0 x 18 RAZA. 1 inflation(s) to a max pressure of: 18    antonia. - [place] Quantum [place] Balloon 3.5x15. 1 inflation(s) to a max pressure of:    14 antonia. - NC Quantum [place] Balloon 3.5x15. 1 inflation(s) to a max pressure of:    18 antonia. - NC Quantum [place] Balloon 3.5x15. 1 inflation(s) to a max pressure of:    18 antonia. - NC Quantum [place] Balloon 3.5x15. 1 inflation(s) to a max pressure of:    18 antonia. Lesion on R PDA: Mid subsection. 70% stenosis 10 mm length reduced to 0%. Pre procedure MARK III flow was noted. Post Procedure MARK III flow was    present. Good runoff was present. The lesion was diagnosed as Low Risk    (A). Devices used       - Xience Alpine 2.5 x 12 RAAZ. 1 inflation(s) to a max pressure of: 14    antonia. Coronary Tree      Dominance: Right     LV Analysis  LV function assessed as:Normal.  Ejection Fraction  +----------------------------------------+---------------------------------+  ! Method                                  ! EF%                              ! +----------------------------------------+---------------------------------+  ! Estimated !50                               !  +----------------------------------------+---------------------------------+       Labs:     CBC:   Recent Labs     08/22/22 2040 08/23/22  0547   WBC 6.7 6.4   HGB 10.8* 10.0*   HCT 33.2* 30.0*    167     BMP:   Recent Labs     08/22/22 2040 08/22/22 2055 08/23/22 0236 08/23/22  0547     --   --  138   K 4.8  --   --  4.8   CO2 20  --   --  22   BUN 23  --   --  21   CREATININE 1.34* 1.36*  --  1.37*   LABGLOM 51* 50*  --  49*   GLUCOSE 238*  --  234 171*     BNP: No results for input(s): BNP in the last 72 hours. PT/INR:   Recent Labs     08/23/22  0547   PROTIME 11.4   INR 1.1     APTT:  Recent Labs     08/22/22 2040 08/23/22  0334   APTT 28.0 107.1*     CARDIAC ENZYMES:No results for input(s): CKTOTAL, CKMB, CKMBINDEX, TROPONINI in the last 72 hours. 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)    Essential hypertension    CAD (coronary artery disease)    Bradycardia    Demand ischemia (HCC)    Unstable angina (HCC)    Anxiety    Chronic rhinitis    Diabetic retinopathy (HCC)    Dizziness    Hyperlipidemia    Insomnia    Lumbar spondylolysis    Stroke syndrome    AK (actinic keratosis)    Chronic sinusitis    Anemia    Arthritis    Paroxysmal atrial fibrillation (HCC)    S/P skin biopsy    Squamous cell carcinoma in situ    Atrial fibrillation with RVR (HCC)    Lactic acid acidosis    CKD (chronic kidney disease) stage 3, GFR 30-59 ml/min (HCC)           IMPRESSION:  NSTEMI, troponins are 13 and 28, St depression in V4-V6 and AVL.   A. fib with RVR, [id number] score 6, currently in NSR, TSH not done, and rivaroxaban at home 20 mg daily  A.fib with aberrancy  Hx of CAD s/p stenting of RCA in 1998 with repeat heart cath in 2018 for STEMI s/p 2 RAZA to mid to proximal RCA and mid PDA  Chronic HFpEF, EF 50% on echo from 2018  Hypertension  Type 2 diabetes mellitus  Hyperlipidemia  Hx of CVA   RENE  Anemia of chronic disease    Recommendations:    Continue aspirin 81 mg daily, Lipitor 80 mg daily, amlodipine 5 mg daily and metoprolol 12.5 mg daily  Continue heparin drip and amiodarone gtt. Trend troponin's  Keep K > 4 and mag > 2      Discussed with patient. Electronically signed by Gretta Correa MD on 8/23/2022 at 6:52 AM    Gretta Correa MD  Internal Medicine Resident, PGY-3  Marshabnam Guajardo,  [facility].  6:52 AM 08/23/22    I performed a history and physical examination of the patient and discussed management with the resident. I reviewed the residents note and agree with the documented findings and plan of care. Any areas of disagreement are noted on the chart. I was personally present for the key portions of any procedures. I have documented in the chart those procedures where I was not present during the key portions. I have personally evaluated this patient and have completed at least one if not all key elements of the E/M (history, physical exam, and MDM). Additional findings are as noted. Chest pain likely due to afib with RVR/ aberancy also noted. HS trop elevation due to CKD and afib with RVR. He had CAD. Now in sinus rhythm. Patient denies any chest pain. Continue xarelto. He has a stress test scheduled as OP. Patient to follow up in cardiology clinic in 1-2 weeks.     Osorio Fernandez MD

## 2022-08-23 NOTE — ED NOTES
The following labs labeled with pt sticker and tubed to lab:     [x] Blue     [x] Lavender   [] on ice  [x] Green/yellow  [] Green/black [] on ice  [] [place]  [] on ice  [] Yellow  [] Red  [] Pink  [] VBG  [] VBG    [] COVID-19 swab    [] Rapid  [] PCR  [] Flu swab  [] Peds Viral Panel     [] Urine Sample  [] Pelvic Cultures  [] Blood Cultures   [] STREP Cultures        Elba Mondragon LPN  64/29/38 [id number]

## 2022-08-23 NOTE — ED NOTES
Per request of intermed. .. cardiology perfect served and notified of patient HR. Awaiting further orders.       Rickey Colbert RN  08/23/22 7782

## 2022-08-23 NOTE — ED NOTES
Cardiology called and stated to call back if patient's HR goes into the 30's to call back. No changes to current medications.       Janel Starkey RN  08/23/22 0347

## 2022-08-23 NOTE — ED NOTES
Labeled blood specimens sent to lab via tube system.     [] Lavender   [] on ice   [] Blue   [x] Green/yellow  [] Green/black [] on ice  [] Pink  [] Red  [] Yellow  [] Blood Cultures      Sandrine Richardson RN  08/23/22 1768

## 2022-08-23 NOTE — ED NOTES
Admitting team notified of pts HR < 50. Awaiting further orders.       Milo Courtney RN  08/23/22 1232

## 2022-08-23 NOTE — ED NOTES
Lab called to confirm add-on APTT to sample already in lab      David Neely [facility]  08/22/22 1723

## 2022-08-23 NOTE — ED NOTES
Pt presents to the ED via [facility] with c/o of chest pain that patient rates 10/10. Pt was at home eating dinner when he had sudden mid-sternal chest pain. Pt called EMS who gave patient 324mg Asprin, 1 nitro, 4mg Zofran, and bolus of amiodarone. Per EMS pt had multiple runs of vtach and was started on amiodarone drip. Pt arrives with two 18g ivs.   Pt is alert and oriented x4, RR even and unlabored. Pt placed on full cardiac monitor, Call light in reach, white board updated.        Dom Campbell RN  08/23/22 1825

## 2022-08-23 NOTE — ACP (ADVANCE CARE PLANNING)
..Advance Care Planning     Advance Care Planning Activator (Inpatient)  Conversation Note      Date of ACP Conversation: 8/23/2022     Conversation Conducted with: Patient with Decision Making Capacity    ACP Activator: Marcus Su, [address] [facility] Decision Maker:  Spouse- Dionna Hall    Current Designated Health Care Decision Maker:     Primary Decision Maker: Gary Self - [phone]    Secondary Decision Maker: Tracie Bonilla - Child - [phone]  Click here to complete Healthcare Decision Makers including section of the Healthcare Decision Maker Relationship (ie \"Primary\")  Today we documented Decision Maker(s) consistent with Legal Next of Kin hierarchy. Care Preferences    Ventilation: \"If you were in your present state of health and suddenly became very ill and were unable to breathe on your own, what would your preference be about the use of a ventilator (breathing machine) if it were available to you? \"      Would the patient desire the use of ventilator (breathing machine)?: no    \"If your health worsens and it becomes clear that your chance of recovery is unlikely, what would your preference be about the use of a ventilator (breathing machine) if it were available to you? \"     Would the patient desire the use of ventilator (breathing machine)?: No      Resuscitation  \"CPR works best to restart the heart when there is a sudden event, like a heart attack, in someone who is otherwise healthy. Unfortunately, CPR does not typically restart the heart for people who have serious health conditions or who are very sick. \"    \"In the event your heart stopped as a result of an underlying serious health condition, would you want attempts to be made to restart your heart (answer \"yes\" for attempt to resuscitate) or would you prefer a natural death (answer \"no\" for do not attempt to resuscitate)? \" no       [] Yes   [x] No   Educated Patient / Decision Maker regarding differences between Advance Directives and portable DNR orders.     Length of ACP Conversation in minutes:  5 minutes    Conversation Outcomes:  [x] ACP discussion completed  [] Existing advance directive reviewed with patient; no changes to patient's previously recorded wishes  [] New Advance Directive completed  [] Portable Do Not Rescitate prepared for Provider review and signature  [] POLST/POST/MOLST/MOST prepared for Provider review and signature      Follow-up plan:    [] Schedule follow-up conversation to continue planning  [x] Referred individual to Provider for additional questions/concerns   [x] Advised patient/agent/surrogate to review completed ACP document and update if needed with changes in condition, patient preferences or care setting    [x] This note routed to one or more involved healthcare providers

## 2022-08-23 NOTE — ED NOTES
Pt HR remains bradycardic in the 40's. Cardiology read perfect serve with no new orders or response. Cardiology perfect served again. Pt remains on life pack.       Dusty Perez RN  08/23/22 0700

## 2022-08-23 NOTE — ACP (ADVANCE CARE PLANNING)
Advance Care Planning     Advance Care Planning Activator (Inpatient)  Conversation Note      Date of ACP Conversation: 8/23/2022     Conversation Conducted with: Patient with Decision Making Capacity    ACP Activator: Guerita Santillan RN        Health Care Decision Maker:     Current Designated Health Care Decision Maker:     Primary Decision Maker: Kwaku [place] - [phone]    Secondary Decision Maker: Raine Michael - Child - [phone]  Click here to complete Healthcare Decision Makers including section of the Healthcare Decision Maker Relationship (ie \"Primary\")      Care Preferences    Ventilation: \"If you were in your present state of health and suddenly became very ill and were unable to breathe on your own, what would your preference be about the use of a ventilator (breathing machine) if it were available to you? \"      Would the patient desire the use of ventilator (breathing machine)?: no    \"If your health worsens and it becomes clear that your chance of recovery is unlikely, what would your preference be about the use of a ventilator (breathing machine) if it were available to you? \"     Would the patient desire the use of ventilator (breathing machine)?: No      Resuscitation  \"CPR works best to restart the heart when there is a sudden event, like a heart attack, in someone who is otherwise healthy. Unfortunately, CPR does not typically restart the heart for people who have serious health conditions or who are very sick. \"    \"In the event your heart stopped as a result of an underlying serious health condition, would you want attempts to be made to restart your heart (answer \"yes\" for attempt to resuscitate) or would you prefer a natural death (answer \"no\" for do not attempt to resuscitate)? \" no       [] Yes   [] No   Educated Patient / Vida Medina regarding differences between Advance Directives and portable DNR orders.     Length of ACP Conversation in minutes:      Radha Hawley Outcomes:  [x] ACP discussion completed  [] Existing advance directive reviewed with patient; no changes to patient's previously recorded wishes  [] New Advance Directive completed  [] Portable Do Not Rescitate prepared for Provider review and signature  [] POLST/POST/MOLST/MOST prepared for Provider review and signature      Follow-up plan:    [] Schedule follow-up conversation to continue planning  [] Referred individual to Provider for additional questions/concerns   [] Advised patient/agent/surrogate to review completed ACP document and update if needed with changes in condition, patient preferences or care setting    [] This note routed to one or more involved healthcare providers

## 2022-08-23 NOTE — PROGRESS NOTES
[facility]  Documentation Note                Admission Dx: Atrial fibrillation with RVR ([facility] [address] [I48.91]    Past Medical History:   has a past medical history of Acute myocardial ischemia ([facility] [address] Acute sinusitis, Cerebral artery occlusion with cerebral infarction [facility]), Chest pain, Diabetes mellitus ([facility] [address] H/O cataract, Hyperlipidemia, Hypertension, MI (myocardial infarction) ([facility] [address] Neck stiffness, Partial small bowel obstruction ([facility] [address] Peyronie's disease, Rotator cuff tendonitis, and TIA (transient ischemic attack). Previous Testing:     HOLTER 8/30/2021:   Patient in sinus rhythm with average of 59 beats per minute. Minimum heart was 45 beats per minute and maximum heart of 91 beats per minute. There was 1 PVC and 803 PACs. Longest RR interval was 1.7 seconds. Patient did not report any symptoms in diary log. There was no evidence of any sustained arrhythmias or symptomatic heart block. ECHO 1/27/18: EF 50%, no regurg/stenosis. CATH 1/26/18:   Single vessel CAD  Successful PCI of mid PDA with one RAZA  Successful PCI of mid and proximal RCA with two RAZA  EF 50%     [facility]   Marta Mcdowell is an 80-year-old male with history significant for ASCVD with previous stent, paroxysmal atrial fibrillation, essential hypertension, mixed hyperlipidemia, previous STEMI, mildly reduced EF 50%. Patient follows up in the office today due to recent hospital discharge for left shoulder and chest discomfort. Patient states he had been out doing some yd work roughly 1-2 days beforehand. Had been noticing some constant left arm shoulder pain that radiated to his from left chest. Patient denies any relieved with rest or exacerbation with activity. Upon presentation to the ER, his cardiac workup was negative. EKG unremarkable. Troponins negative. All other lab work unremarkable. Patient's blood pressure was stable.  No arrhythmias noted on telemetry. Patient was ultimately discharged home with follow-up today. In office he denies any further chest pain symptoms. We discussed testing results in detail. Patient's questions were answered. We also discussed repeating a nuclear stress test considering his history of coronary artery disease with residual areas of stenosis. His last cardiac catheterization was in 2018. Patient verbalized understanding was agreeable. Otherwise patient has no significant symptoms in the office today. Plan --   1. ASCVD with previous stents  -RAZA to RCA, 2018   -lad 40% mid  -proximal OM1 70%  -om 2 99%   -maintained on Plavix, statin, metoprolol  -repeat nuclear stress test     2. 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 Paroxysmal atrial fibrillation   -maintained on metoprolol and Xarelto  -currently with good control     4. Essential hypertension   -currently good control    5. Mixed hyperlipidemia    Overall patient doing well. No further symptoms. Concern regarding previous history of coronary artery disease with stents as well as residual disease. Will obtain nuclear stress test to rule out any evidence of ischemia. Continue with current medication regimen. Will plan for follow-up visit roughly 6 months or sooner as needed. Patient is encouraged to call the office with any questions, comments, concerns.     [facility]

## 2022-08-23 NOTE — H&P
[facility]  Office: [address], DO, Tiara Meredith, DO, Neel Qrueshi, DO, Cornelius Shayy Blood, DO, Willem Powers MD, Lazarus Vee MD, Jey Darnell MD, Julianna Truong MD,  Saadia Thomas MD, Akanksha Benson MD, Len Muñoz, DO, Ele Pozo MD,  Yamileth Mahoney MD, Lieutenant Moreno MD, Joselin Dove, DO, Jamee Walker MD, Kelly Danielle MD, Kimberly Garcia MD, Dom Rowe, DO, Jose C Resendiz MD, Namita Blue MD, Rodolfo Jay, CNP,  Samy Segura, CNP, Kandy Navarro, CNP, Dean Gutiérrez, CNP, Darrius Fishman PA-C, Nicky Benjamin, [facility], Cisco Fernandez, CNP, Renu Maria, CNP, Isabella Bradshaw, CNP, Anju Macdonald, CNP, Annamarie Camargo, CNP, Ling Yi, CNS, Jalen Birmingham, [facility], Daniel Morales, CNP, Roxy Barrett, CNP, Alex Wilburn, [facility]           [address]    HISTORY AND PHYSICAL EXAMINATION            Date:   8/23/2022  Patient name:  Madhavi Nunez  Date of admission:  8/22/2022  8:22 PM  MRN:   [id number]  Account:  [de-identified]  YOB: 1935  PCP:    Alida Wilkins MD  Room:   [facility]  Code Status:    Full Code    Chief Complaint:     Chief Complaint   Patient presents with    Chest Pain   \" It hit me all of a sudden\"    History Obtained From:     patient    History of Present Illness:     Madhavi Nunez is a 80 y.o.  male with history of coronary disease status post prior MI in 2018, prior stroke, type 2 diabetes, CKD 3 who presented with acute symptomatic tachycardia with chest pain with Chest Pain   and is admitted to the hospital for the management of Atrial fibrillation with RVR ([facility] [address] Patient with prior episode of chest pain 2 to 3 weeks ago status [place] [facility] Was seen in follow-up with [address] [place] [address] cardiology earlier in August.  Recommended for stress test which was scheduled for September.   Patient describes abrupt onset of chest pain earlier this evening after standing up in the kitchen after eating. Symptoms persisted for 2-1/2 hours constant. Currently resolved since arrival to ED status post heparin/amio drip. Pain was initially Sharp aching quality midsternum with radiation to the left shoulder. Denied any prior episode of chest pain shortness of breath when he was doing yard work earlier that morning. Was out in the yard for approximately 30 minutes this morning. Did denies any heavy lifting. With chest pain episode patient did become acutely diaphoretic with nausea with mild pleurisy. + Tachycardia.  + Lightheadedness but denies near syncope or collapse. Denies prior DVT PE or recent travel. Status post heparin/amio drip in ED. In ED patient found to have heart rates in the 130s to 150s with A. fib status post loading with amiodarone drip. Chest pain has resolved since controlling tachycardia. Denies any prior issues with tachycardia, palpitations. Denies fevers or chills or flulike symptoms. Denies positional changes. Past Medical History:     Past Medical History:   Diagnosis Date    Acute myocardial ischemia ([address] 12/11/2015    Acute sinusitis 12/11/2015    Cerebral artery occlusion with cerebral infarction [facility])     Chest pain 10/5/2015    Diabetes mellitus ([address]     H/O cataract     Hyperlipidemia     Hypertension     MI (myocardial infarction) ([address] 1985    Neck stiffness 12/11/2015    Partial small bowel obstruction ([address] 11/7/2019    Peyronie's disease     Rotator cuff tendonitis 12/11/2015    TIA (transient ischemic attack) 1998        Past Surgical History:     Past Surgical History:   Procedure Laterality Date    CARDIAC CATHETERIZATION      mulitple caths- 5 stents total    CARDIAC SURGERY  01/2018    3 stents    CATARACT REMOVAL WITH IMPLANT Left     SKIN BIOPSY      forehead        Medications Prior to Admission:     Prior to Admission medications    Medication Sig Start Date End Date Taking?  Authorizing Provider magnesium oxide (MAG-OX) 400 MG tablet Take 1 tablet by mouth in the morning.  8/5/22   Richard Beckford MD   ULTICARE SHORT PEN NEEDLES 31G X 8 MM MISC INJECT 1 NEEDLE INTO THE SKIN DAILY 7/20/22   Richard Beckford MD   Accu-Chek FastClix Lancets MIS USE ONE LANCET TO TEST TWICE A DAY AND AS NEEDED 6/20/22   Russel Titus PA-C   blood glucose test strips (FREESTYLE TEST STRIPS) strip USE ONE STRIP TO TEST TWICE A DAY AND AS NEEDED 6/20/22   Russel Titus PA-C   atorvastatin (LIPITOR) 80 MG tablet TAKE 1 TABLET NIGHTLY 6/6/22   Richard Beckford MD   clopidogrel (PLAVIX) 75 MG tablet TAKE 1 TABLET DAILY 6/6/22   Richard Beckford MD   metoprolol succinate (TOPROL XL) 25 MG extended release tablet Take 0.5 tablets by mouth daily 5/17/22   Richard Beckford MD   LANTUS SOLOSTAR 100 UNIT/ML injection pen INJECT 15 UNITS INTO THE SKIN NIGHTLY 4/6/22   Richard Beckford MD   XARELTO 20 MG TABS tablet TAKE 1 TABLET DAILY 4/4/22   Richard Beckford MD   metFORMIN (GLUCOPHAGE-XR) 500 MG extended release tablet Take 3 tablets by mouth daily (with breakfast) 2/21/22   Richard Beckford MD   amLODIPine (NORVASC) 5 MG tablet TAKE 1 TABLET DAILY 12/3/21   Richard Beckford MD   lisinopril-hydroCHLOROthiazide (PRINZIDE;ZESTORETIC) 20-12.5 MG per tablet TAKE 1 TABLET DAILY 12/3/21   Richard Beckford MD   Blood Glucose Monitoring Suppl (FREESTYLE FREEDOM LITE) w/Device KIT 1 kit by Does not apply route daily as needed (check sugars bid and prn) 11/22/21   Richard Beckford MD   acetaminophen (TYLENOL) 500 MG tablet Take 500 mg by mouth every 6 hours as needed for Pain    Historical Provider, MD   Lancet Device MISC 1 Device by Does not apply route once for 1 dose 2/18/19 2/21/22  Richard Beckford MD   nitroGLYCERIN (NITROSTAT) 0.4 MG SL tablet Place 1 tablet under the tongue every 5 minutes as needed for Chest pain 1/28/18   Dhaval Link MD Coenzyme Q10 (CO Q-10) 30 MG CAPS Take 30 mg by mouth daily    Historical Provider, MD   Ascorbic Acid (VITAMIN C) 250 MG tablet Take 500 mg by mouth daily    Historical Provider, MD   ferrous sulfate 325 (65 FE) MG tablet Take 325 mg by mouth daily     Historical Provider, MD        Allergies:     Sulfa antibiotics    Social History:     Tobacco:    reports that he has quit smoking. He has a 4.00 pack-year smoking history. He has never used smokeless tobacco.  Alcohol:      reports no history of alcohol use. Drug Use:  reports no history of drug use. Lives independently, denies tobacco excessive binge drinking or illegal drugs. Ox independently. No recent travel, no falls or injuries, no sick contacts. Family History:     Family History   Problem Relation Age of Onset    Cancer Mother         oral    Stroke Father     High Blood Pressure Brother     Heart Disease Brother     Cancer Maternal Grandmother     Diabetes type 2  Son     Heart Attack Son     Heart Disease Maternal Grandfather     Stroke Paternal Grandmother     Heart Disease Paternal Grandfather         ? Review of Systems:     Positive and Negative as described in HPI. Review of Systems  Denies prior issues with palpitations, tachycardia . patient does describe some occasional indigestion but denies abdominal pain, no diarrhea or constipation. Denies any COVID 19 exposure, denies vaccination. Does have remote history of TIA/stroke but denies any residual deficits. Denies any sinus congestion or URI symptoms or headaches. Denies history of sleep apnea. Denies any issues with hypoglycemia or hyperglycemia, does have neuropathy but denies other complications associate with diabetes. Denies myalgias or arthralgias. He does have spinal stenosis with the leg cramping at night. Denies falls or injuries. He does occasionally have radicular symptoms in his right leg, stable, denies urine or fecal incontinence, no saddle anesthesia. Denies any urinary symptoms no dysuria hematuria frequency urgency. Denies recent falls or injuries. Denies history of DVT PE. Denies easy bruising or bleeding issues melena , bright red blood per rectum. denies hemoptysis.   Review of systems otherwise -12 system review and otherwise unremarkable    Physical Exam:   BP (!) 127/57   Pulse 50   Temp 98.3 °F (36.8 °C) (Oral)   Resp 16   Wt 180 lb (81.6 kg)   SpO2 93%   BMI 25.83 kg/m²   Temp (24hrs), Av.3 °F (36.8 °C), Min:98.3 °F (36.8 °C), Max:98.3 °F (36.8 °C)    Recent Labs     22  0229   POCGLU 253* 235* 234*       Intake/Output Summary (Last 24 hours) at 2022 0644  Last data filed at 2022 0403  Gross per 24 hour   Intake --   Output 300 ml   Net -300 ml       Physical Exam  General sleeping but arouses easily, appropriate upon awakening, slightly slow speech fluency but oriented follows commands  Eye exam pupils equally round reactive sclera nonicteric normal horizontal gaze  ENT mild facial asymmetry neck supple dentures in place, moist mucous membranes  Cardiac regular rate and rhythm normal S1-S2 without murmurs rubs or gallops no JVD sitting upright  Lungs slightly diminished at bases with adequate air exchange nonlabored no tachypnea no accessory muscle use no wheezing or rhonchi  GI soft nontender nondistended bowel sounds present  Musculoskeletal no reproduction of chest pain with palpation of chest wall or range of motion of upper extremities, no synovitis or joint effusions  Derm adequate foot hygiene, no rashes lesions or skin infections  Neuro otherwise nonfocal, no obvious drift in upper and lower limbs sensation grossly intact normal cognition  Vascular intact dorsalis pedis and posterior tibialis with mild pedal edema in feet and ankles    Investigations:      Laboratory Testing:  Recent Results (from the past 24 hour(s))   CBC with Auto Differential    Collection Time: 22  8:40 PM Result Value Ref Range    WBC 6.7 3.5 - 11.3 k/uL    RBC 3.51 (L) 4.21 - 5.77 m/uL    Hemoglobin 10.8 (L) 13.0 - 17.0 g/dL    Hematocrit 33.2 (L) 40.7 - 50.3 %    MCV 94.6 82.6 - 102.9 fL    MCH 30.8 25.2 - 33.5 pg    MCHC 32.5 28.4 - 34.8 g/dL    RDW 12.5 11.8 - 14.4 %    Platelets 743 335 - 431 k/uL    MPV 11.0 8.1 - 13.5 fL    NRBC Automated 0.0 0.0 per 100 WBC    Seg Neutrophils 45 36 - 65 %    Lymphocytes 41 24 - 43 %    Monocytes 12 3 - 12 %    Eosinophils % 2 1 - 4 %    Basophils 0 0 - 2 %    Immature Granulocytes 0 0 %    Segs Absolute 2.98 1.50 - 8.10 k/uL    Absolute Lymph # 2.77 1.10 - 3.70 k/uL    Absolute Mono # 0.79 0.10 - 1.20 k/uL    Absolute Eos # 0.15 0.00 - 0.44 k/uL    Basophils Absolute 0.03 0.00 - 0.20 k/uL    Absolute Immature Granulocyte <0.03 0.00 - 0.30 k/uL   Basic Metabolic Panel w/ Reflex to MG    Collection Time: 08/22/22  8:40 PM   Result Value Ref Range    Glucose 238 (H) 70 - 99 mg/dL    BUN 23 8 - 23 mg/dL    Creatinine 1.34 (H) 0.70 - 1.20 mg/dL    Calcium 8.7 8.6 - 10.4 mg/dL    Sodium 138 135 - 144 mmol/L    Potassium 4.8 3.7 - 5.3 mmol/L    Chloride 105 98 - 107 mmol/L    CO2 20 20 - 31 mmol/L    Anion Gap 13 9 - 17 mmol/L    GFR Non-African American 51 (L) >60 mL/min    GFR African American >60 >60 mL/min    GFR Comment         Troponin    Collection Time: 08/22/22  8:40 PM   Result Value Ref Range    Troponin, High Sensitivity 13 0 - 22 ng/L   Brain Natriuretic Peptide    Collection Time: 08/22/22  8:40 PM   Result Value Ref Range    Pro-BNP 1,009 (H) <300 pg/mL   POC Glucose Fingerstick    Collection Time: 08/22/22  8:40 PM   Result Value Ref Range    POC Glucose 253 (H) 75 - 110 mg/dL   APTT    Collection Time: 08/22/22  8:40 PM   Result Value Ref Range    PTT 28.0 20.5 - 30.5 sec   Venous Blood Gas, POC    Collection Time: 08/22/22  8:55 PM   Result Value Ref Range    pH, Kamran 7.391 7.320 - 7.430    pCO2, Kamran 33.9 (L) 41.0 - 51.0 mm Hg    pO2, Kamran 45.3 30.0 - 50.0 mm Hg HCO3, Venous 20.6 (L) 22.0 - 29.0 mmol/L    Negative Base Excess, Kamran 4 (H) 0.0 - 2.0    O2 Sat, Kamran 81 60.0 - 85.0 %   ELECTROLYTES PLUS    Collection Time: 08/22/22  8:55 PM   Result Value Ref Range    POC Sodium 140 138 - 146 mmol/L    POC Potassium 4.6 (H) 3.5 - 4.5 mmol/L    POC Chloride 109 (H) 98 - 107 mmol/L    POC TCO2 21 (L) 22 - 30 mmol/L    Anion Gap 11 7 - 16 mmol/L   Hemoglobin and hematocrit, blood    Collection Time: 08/22/22  8:55 PM   Result Value Ref Range    POC Hemoglobin 10.3 (L) 13.5 - 17.5 g/dL    POC Hematocrit 30 (L) 41 - 53 %   Creatinine W/GFR Point of Care    Collection Time: 08/22/22  8:55 PM   Result Value Ref Range    POC Creatinine 1.36 (H) 0.51 - 1.19 mg/dL    GFR Comment >60 >60 mL/min    GFR Non- 50 (L) >60 mL/min    GFR Comment         CALCIUM, IONIC (POC)    Collection Time: 08/22/22  8:55 PM   Result Value Ref Range    POC Ionized Calcium 1.20 1. 15 - 1.33 mmol/L   POCT urea (BUN)    Collection Time: 08/22/22  8:55 PM   Result Value Ref Range    POC BUN 21 8 - 26 mg/dL   Lactic Acid, POC    Collection Time: 08/22/22  8:55 PM   Result Value Ref Range    POC Lactic Acid 1.58 (H) 0.56 - 1.39 mmol/L   POCT Glucose    Collection Time: 08/22/22  8:55 PM   Result Value Ref Range    POC Glucose 235 (H) 74 - 100 mg/dL   Troponin    Collection Time: 08/22/22 10:40 PM   Result Value Ref Range    Troponin, High Sensitivity 28 (H) 0 - 22 ng/L   POC Glucose Fingerstick    Collection Time: 08/23/22  2:29 AM   Result Value Ref Range    POC Glucose 234 (H) 75 - 110 mg/dL   POCT glucose    Collection Time: 08/23/22  2:36 AM   Result Value Ref Range    Glucose 234 mg/dL    QC OK?  yes    APTT    Collection Time: 08/23/22  3:34 AM   Result Value Ref Range    .1 (HH) 20.5 - 30.5 sec   Comprehensive Metabolic Panel w/ Reflex to MG    Collection Time: 08/23/22  5:47 AM   Result Value Ref Range    Glucose 171 (H) 70 - 99 mg/dL    BUN 21 8 - 23 mg/dL    Creatinine 1.37 (H) 0.70 - 1.20 mg/dL    Calcium 8.4 (L) 8.6 - 10.4 mg/dL    Sodium 138 135 - 144 mmol/L    Potassium 4.8 3.7 - 5.3 mmol/L    Chloride 106 98 - 107 mmol/L    CO2 22 20 - 31 mmol/L    Anion Gap 10 9 - 17 mmol/L    Alkaline Phosphatase 83 40 - 129 U/L    ALT 12 5 - 41 U/L    AST 22 <40 U/L    Total Bilirubin 0.26 (L) 0.3 - 1.2 mg/dL    Total Protein 6.2 (L) 6.4 - 8.3 g/dL    Albumin 3.7 3.5 - 5.2 g/dL    Albumin/Globulin Ratio 1.5 1.0 - 2.5    GFR Non- 49 (L) >60 mL/min    GFR African American 60 (L) >60 mL/min    GFR Comment         Magnesium    Collection Time: 08/23/22  5:47 AM   Result Value Ref Range    Magnesium 2.2 1.6 - 2.6 mg/dL   Protime-INR    Collection Time: 08/23/22  5:47 AM   Result Value Ref Range    Protime 11.4 9.1 - 12.3 sec    INR 1.1    Brain natriuretic peptide    Collection Time: 08/23/22  5:47 AM   Result Value Ref Range    Pro-BNP 1,706 (H) <300 pg/mL   CBC with Auto Differential    Collection Time: 08/23/22  5:47 AM   Result Value Ref Range    WBC 6.4 3.5 - 11.3 k/uL    RBC 3.23 (L) 4.21 - 5.77 m/uL    Hemoglobin 10.0 (L) 13.0 - 17.0 g/dL    Hematocrit 30.0 (L) 40.7 - 50.3 %    MCV 92.9 82.6 - 102.9 fL    MCH 31.0 25.2 - 33.5 pg    MCHC 33.3 28.4 - 34.8 g/dL    RDW 12.3 11.8 - 14.4 %    Platelets 474 051 - 500 k/uL    MPV 10.8 8.1 - 13.5 fL    NRBC Automated 0.0 0.0 per 100 WBC    Seg Neutrophils 56 36 - 65 %    Lymphocytes 31 24 - 43 %    Monocytes 11 3 - 12 %    Eosinophils % 1 1 - 4 %    Basophils 1 0 - 2 %    Immature Granulocytes 0 0 %    Segs Absolute 3.60 1.50 - 8.10 k/uL    Absolute Lymph # 1.95 1.10 - 3.70 k/uL    Absolute Mono # 0.69 0.10 - 1.20 k/uL    Absolute Eos # 0.08 0.00 - 0.44 k/uL    Basophils Absolute 0.03 0.00 - 0.20 k/uL    Absolute Immature Granulocyte <0.03 0.00 - 0.30 k/uL       Imaging/Diagnostics:  XR CHEST PORTABLE    Result Date: 8/22/2022  Clear lungs.        Assessment :      Hospital Problems             Last Modified POA    * (Principal) Atrial fibrillation with RVR ([address] 8/23/2022 Yes    Lactic acid acidosis 8/23/2022 Yes    CKD (chronic kidney disease) stage 3, GFR 30-59 ml/min ([facility] 8/23/2022 Yes    Controlled type 2 diabetes with neuropathy ([address] 8/23/2022 Yes    CAD (coronary artery disease) 8/23/2022 Yes    Demand ischemia ([address] 8/23/2022 Yes    Unstable angina ([address] 8/23/2022 Yes    Anemia 8/23/2022 Yes       Plan:     Patient status inpatient in the Progressive Unit/Step down    A. fib with RVR with chest pain syndrome/suspect demand ischemia with possible component of unstable angina/coronary disease status post MI in 2018. Recently evaluated at [facility] earlier this month with chest pain spell. Recommended outpatient stress test.  Now returns with recurrent chest pain with A. fib with RVR, elevated troponin. Pending cardiology input status post amio and heparin drip. Continue aggressive medical treatment with high intensity statin, aspirin. Check echo. Continue to trend troponins. Continue rate control status post amio per cardio. Type 2 diabetes with diabetic neuropathy. Her A1c 7.2. Continue blood sugar control  Acute lactic acidosis likely from acute illness  History of prior TIA/stroke. Hold Xarelto while on heparin drip. Likely transition back to Xarelto once stabilized      Anticipate likely discharge in 2 to 3 days. Questions and concerns and treatment plan discussed with patient. Dr. Steve Means with cardiology notified of consult. Consultations:   IP CONSULT TO HOSPITALIST  IP CONSULT TO CARDIOLOGY    Patient is admitted as inpatient status because of co-morbidities listed above, severity of signs and symptoms as outlined, requirement for current medical therapies and most importantly because of direct risk to patient if care not provided in a hospital setting. Expected length of stay > 48 hours.     Azeem Mcdaniel MD  8/23/2022  6:44 AM    Copy sent to Dr. Saulo Tucker MD

## 2022-08-23 NOTE — ED PROVIDER NOTES
[facility] ED  Emergency Department Encounter  Emergency Medicine Resident     Pt Name:Dawson Juarez  MRN: [id number]  Lacygfserenity 1935  Date of evaluation: 8/22/22  PCP:  Eran Matamoros MD      [address]       Chief Complaint   Patient presents with    Chest Pain       HISTORY OF PRESENT ILLNESS  (Location/Symptom, Timing/Onset, Context/Setting, Quality, Duration, Modifying Factors, Severity.)      Vikram Roth is a 80 y.o. male who presents with complaint of chest pain. Past medical history significant for MI, acute CVA, hyperlipidemia, hypertension. Patient developed pressure-like chest pain that radiated to left shoulder that occurred earlier today. Had associated shortness of breath, nausea as well as diaphoresis. Has stents placed on prior MI and states this feels similar. EMS responded and patient was in A. fib with RVR with episodes of V. tach. Patient was placed on amiodarone drip. Patient also given nitro and aspirin. Denies any one-sided weakness, slurred speech, difficulty swallowing, worsening lightheadedness, abdominal pain, diarrhea, vomiting, leg swelling or change in mental status. PAST MEDICAL / SURGICAL / SOCIAL / FAMILY HISTORY      has a past medical history of Acute myocardial ischemia ([facility] [address] Acute sinusitis, Cerebral artery occlusion with cerebral infarction [facility]), Chest pain, Diabetes mellitus ([address] H/O cataract, Hyperlipidemia, Hypertension, MI (myocardial infarction) ([address] Neck stiffness, Partial small bowel obstruction ([address] Peyronie's disease, Rotator cuff tendonitis, and TIA (transient ischemic attack). has a past surgical history that includes Cardiac surgery (01/2018); Cardiac catheterization; Cataract removal with implant (Left); and skin biopsy.       Social History     Socioeconomic History    Marital status:      Spouse name: Not on file    Number of children: Not on file    Years of education: Not on file Highest education level: Not on file   Occupational History    Not on file   Tobacco Use    Smoking status: Former     Packs/day: 0.50     Years: 8.00     Pack years: 4.00     Types: Cigarettes    Smokeless tobacco: Never   Substance and Sexual Activity    Alcohol use: No    Drug use: No    Sexual activity: Not on file   Other Topics Concern    Not on file   Social History Narrative    Not on file     Social Determinants of Health     Financial Resource Strain: Low Risk     Difficulty of Paying Living Expenses: Not hard at all   Food Insecurity: No Food Insecurity    Worried About Running Out of Food in the Last Year: Never true    Ran Out of Food in the Last Year: Never true   Transportation Needs: Not on file   Physical Activity: Not on file   Stress: Not on file   Social Connections: Not on file   Intimate Partner Violence: Not on file   Housing Stability: Not on file       Family History   Problem Relation Age of Onset    Cancer Mother         oral    Stroke Father     High Blood Pressure Brother     Heart Disease Brother     Cancer Maternal Grandmother     Diabetes type 2  Son     Heart Attack Son     Heart Disease Maternal Grandfather     Stroke Paternal Grandmother     Heart Disease Paternal Grandfather         ? Allergies:  Sulfa antibiotics    Home Medications:  Prior to Admission medications    Medication Sig Start Date End Date Taking? Authorizing Provider   magnesium oxide (MAG-OX) 400 MG tablet Take 1 tablet by mouth in the morning.  8/5/22   Derek Louise MD   [employer] SHORT PEN NEEDLES 31G X 8 MM MISC INJECT 1 NEEDLE INTO THE SKIN DAILY 7/20/22   Derek Louise MD   Accu-Chek FastClix Lancets MISC USE ONE LANCET TO TEST TWICE A DAY AND AS NEEDED 6/20/22   Mariluz Ang PA-C   blood glucose test strips (FREESTYLE TEST STRIPS) strip USE ONE STRIP TO TEST TWICE A DAY AND AS NEEDED 6/20/22   Mariluz Ang PA-C   atorvastatin (LIPITOR) 80 MG tablet TAKE 1 TABLET NIGHTLY 6/6/22   Mathew Gilliland MD   clopidogrel (PLAVIX) 75 MG tablet TAKE 1 TABLET DAILY 6/6/22   Mathew Gilliland MD   metoprolol succinate (TOPROL XL) 25 MG extended release tablet Take 0.5 tablets by mouth daily 5/17/22   MD MARCELLUS Lujan SOLOSTAR 100 UNIT/ML injection pen INJECT 15 UNITS INTO THE SKIN NIGHTLY 4/6/22   Mathew Gilliland MD   XARELTO 20 MG TABS tablet TAKE 1 TABLET DAILY 4/4/22   Mathew Gilliland MD   metFORMIN (GLUCOPHAGE-XR) 500 MG extended release tablet Take 3 tablets by mouth daily (with breakfast) 2/21/22   Mathew Gilliland MD   amLODIPine (NORVASC) 5 MG tablet TAKE 1 TABLET DAILY 12/3/21   Mathew Gilliland MD   lisinopril-hydroCHLOROthiazide (PRINZIDE;ZESTORETIC) 20-12.5 MG per tablet TAKE 1 TABLET DAILY 12/3/21   Mathew Gilliland MD   Blood Glucose Monitoring Suppl (FREESTYLE FREEDOM LITE) w/Device KIT 1 kit by Does not apply route daily as needed (check sugars bid and prn) 11/22/21   Mathew Gilliland MD   acetaminophen (TYLENOL) 500 MG tablet Take 500 mg by mouth every 6 hours as needed for Pain    Historical Provider, MD   Lancet Device MISC 1 Device by Does not apply route once for 1 dose 2/18/19 2/21/22  Mathew Gilliland MD   nitroGLYCERIN (NITROSTAT) 0.4 MG SL tablet Place 1 tablet under the tongue every 5 minutes as needed for Chest pain 1/28/18   Marvin Dubon MD   Coenzyme Q10 (CO Q-10) 30 MG CAPS Take 30 mg by mouth daily    Historical Provider, MD   Ascorbic Acid (VITAMIN C) 250 MG tablet Take 500 mg by mouth daily    Historical Provider, MD   ferrous sulfate 325 (65 FE) MG tablet Take 325 mg by mouth daily     Historical Provider, MD       REVIEW OF SYSTEMS    (2-9 systems for level 4, 10 or more for level 5)      Review of Systems   Constitutional:  Negative for chills and fever. HENT:  Negative for congestion and rhinorrhea. Eyes:  Negative for visual disturbance.    Respiratory:  Positive for shortness of breath. Cardiovascular:  Positive for chest pain. Negative for leg swelling. Gastrointestinal:  Positive for nausea. Negative for abdominal pain, diarrhea and vomiting. Musculoskeletal:  Negative for back pain. Skin:  Negative for rash and wound. Neurological:  Negative for weakness, numbness and headaches. PHYSICAL EXAM   (up to 7 for level 4, 8 or more for level 5)      INITIAL VITALS:   /75   Pulse 98   Temp 98.3 °F (36.8 °C) (Oral)   Resp 21   Wt 180 lb (81.6 kg)   SpO2 97%   BMI 25.83 kg/m²     Physical Exam  Constitutional:       General: He is in acute distress. Appearance: He is not ill-appearing, toxic-appearing or diaphoretic. HENT:      Head: Normocephalic and atraumatic. Eyes:      Extraocular Movements: Extraocular movements intact. Cardiovascular:      Rate and Rhythm: Tachycardia present. Rhythm irregular. Pulses: Normal pulses. Heart sounds: No murmur heard. No friction rub. No gallop. Pulmonary:      Effort: No respiratory distress. Breath sounds: No stridor. No wheezing, rhonchi or rales. Chest:      Chest wall: No tenderness. Abdominal:      General: There is no distension. Palpations: There is no mass. Tenderness: There is no abdominal tenderness. There is no guarding or rebound. Hernia: No hernia is present. Musculoskeletal:         General: No swelling, tenderness, deformity or signs of injury. Skin:     Capillary Refill: Capillary refill takes less than 2 seconds. Coloration: Skin is not jaundiced or pale. Findings: No bruising, erythema, lesion or rash. Neurological:      Mental Status: He is alert and oriented to person, place, and time.        DIFFERENTIAL  DIAGNOSIS     PLAN (LABS / IMAGING / EKG):  Orders Placed This Encounter   Procedures    XR CHEST PORTABLE    CBC with Auto Differential    Basic Metabolic Panel w/ Reflex to MG    Troponin    Brain Natriuretic Peptide    ELECTROLYTES PLUS Hemoglobin and hematocrit, blood    CALCIUM, IONIC (POC)    APTT    APTT    Comprehensive Metabolic Panel w/ Reflex to MG    Magnesium    Protime-INR    Brain natriuretic peptide    CBC with Auto Differential    APTT    ADULT DIET;  Regular    Vital signs per unit routine    Notify physician    Up with assistance    Daily weights    Intake and output    Neurovascular checks    HYPOGLYCEMIA TREATMENT: blood glucose less than 70 mg/dL and patient ALERT and TOLERATING PO    HYPOGLYCEMIA TREATMENT: blood glucose less than 70 mg/dL and patient NOT ALERT or NPO    Telemetry monitoring - continuous duration    Full Code    Inpatient consult to Hospitalist    Inpatient consult to Cardiology    POCT glucose    POC Glucose Fingerstick    Venous Blood Gas, POC    Creatinine W/GFR Point of Care    POCT urea (BUN)    Lactic Acid, POC    POCT Glucose    POCT Glucose    POCT Glucose    EKG 12 Lead    ADMIT TO INPATIENT       MEDICATIONS ORDERED:  Orders Placed This Encounter   Medications    DISCONTD: amiodarone (CORDARONE) 450 mg in dextrose 5 % 250 mL infusion    FOLLOWED BY Linked Order Group     amiodarone (CORDARONE) 150 mg in dextrose 5 % 100 mL bolus     amiodarone (CORDARONE) 450 mg in dextrose 5 % 250 mL infusion     amiodarone (CORDARONE) 450 mg in dextrose 5 % 250 mL infusion    ondansetron (ZOFRAN) injection 4 mg    magnesium sulfate 2 GM/50ML infusion     Vandana Mccloud: cabinet override    magnesium sulfate 2000 mg in 50 mL IVPB premix    heparin (porcine) injection 4,000 Units    heparin (porcine) injection 4,000 Units    heparin (porcine) injection 2,000 Units    heparin 25,000 units in dextrose 5 % 250 mL infusion (rate based)    amLODIPine (NORVASC) tablet 5 mg    atorvastatin (LIPITOR) tablet 80 mg    clopidogrel (PLAVIX) tablet 75 mg    insulin glargine (LANTUS) injection vial 15 Units    metoprolol succinate (TOPROL XL) extended release tablet 12.5 mg    sodium chloride flush 0.9 % injection 5-40 mL    sodium chloride flush 0.9 % injection 10 mL    0.9 % sodium chloride infusion    OR Linked Order Group     ondansetron (ZOFRAN-ODT) disintegrating tablet 4 mg     ondansetron (ZOFRAN) injection 4 mg    polyethylene glycol (GLYCOLAX) packet 17 g    OR Linked Order Group     acetaminophen (TYLENOL) tablet 650 mg     acetaminophen (TYLENOL) suppository 650 mg    glucose chewable tablet 16 g    OR Linked Order Group     dextrose bolus 10% 125 mL     dextrose bolus 10% 250 mL    glucagon (rDNA) injection 1 mg    dextrose 10 % infusion    DISCONTD: heparin (porcine) injection 4,900 Units    DISCONTD: heparin (porcine) injection 2,450 Units    DISCONTD: heparin 25,000 units in dextrose 5 % 250 mL infusion (rate based)    insulin lispro (HUMALOG) injection vial 0-8 Units    insulin lispro (HUMALOG) injection vial 0-4 Units       DDX: A. fib with RVR, electrolyte abnormality, ACS, pneumonia, PE, pneumothorax, esophageal perforation    DIAGNOSTIC RESULTS / EMERGENCY DEPARTMENT COURSE / MDM   LAB RESULTS:  Results for orders placed or performed during the hospital encounter of 08/22/22   CBC with Auto Differential   Result Value Ref Range    WBC 6.7 3.5 - 11.3 k/uL    RBC 3.51 (L) 4.21 - 5.77 m/uL    Hemoglobin 10.8 (L) 13.0 - 17.0 g/dL    Hematocrit 33.2 (L) 40.7 - 50.3 %    MCV 94.6 82.6 - 102.9 fL    MCH 30.8 25.2 - 33.5 pg    MCHC 32.5 28.4 - 34.8 g/dL    RDW 12.5 11.8 - 14.4 %    Platelets 843 995 - 961 k/uL    MPV 11.0 8.1 - 13.5 fL    NRBC Automated 0.0 0.0 per 100 WBC    Seg Neutrophils 45 36 - 65 %    Lymphocytes 41 24 - 43 %    Monocytes 12 3 - 12 %    Eosinophils % 2 1 - 4 %    Basophils 0 0 - 2 %    Immature Granulocytes 0 0 %    Segs Absolute 2.98 1.50 - 8.10 k/uL    Absolute Lymph # 2.77 1.10 - 3.70 k/uL    Absolute Mono # 0.79 0.10 - 1.20 k/uL    Absolute Eos # 0.15 0.00 - 0.44 k/uL    Basophils Absolute 0.03 0.00 - 0.20 k/uL    Absolute Immature Granulocyte <0.03 0.00 - 0.30 k/uL   Basic Metabolic Panel w/ Reflex to MG   Result Value Ref Range    Glucose 238 (H) 70 - 99 mg/dL    BUN 23 8 - 23 mg/dL    Creatinine 1.34 (H) 0.70 - 1.20 mg/dL    Calcium 8.7 8.6 - 10.4 mg/dL    Sodium 138 135 - 144 mmol/L    Potassium 4.8 3.7 - 5.3 mmol/L    Chloride 105 98 - 107 mmol/L    CO2 20 20 - 31 mmol/L    Anion Gap 13 9 - 17 mmol/L    GFR Non-African American 51 (L) >60 mL/min    GFR African American >60 >60 mL/min    GFR Comment         Troponin   Result Value Ref Range    Troponin, High Sensitivity 13 0 - 22 ng/L   Brain Natriuretic Peptide   Result Value Ref Range    Pro-BNP 1,009 (H) <300 pg/mL   ELECTROLYTES PLUS   Result Value Ref Range    POC Sodium 140 138 - 146 mmol/L    POC Potassium 4.6 (H) 3.5 - 4.5 mmol/L    POC Chloride 109 (H) 98 - 107 mmol/L    POC TCO2 21 (L) 22 - 30 mmol/L    Anion Gap 11 7 - 16 mmol/L   Hemoglobin and hematocrit, blood   Result Value Ref Range    POC Hemoglobin 10.3 (L) 13.5 - 17.5 g/dL    POC Hematocrit 30 (L) 41 - 53 %   CALCIUM, IONIC (POC)   Result Value Ref Range    POC Ionized Calcium 1.20 1. 15 - 1.33 mmol/L   APTT   Result Value Ref Range    PTT 28.0 20.5 - 30.5 sec   POC Glucose Fingerstick   Result Value Ref Range    POC Glucose 253 (H) 75 - 110 mg/dL   Venous Blood Gas, POC   Result Value Ref Range    pH, Kamran 7.391 7.320 - 7.430    pCO2, Kamran 33.9 (L) 41.0 - 51.0 mm Hg    pO2, Kamran 45.3 30.0 - 50.0 mm Hg    HCO3, Venous 20.6 (L) 22.0 - 29.0 mmol/L    Negative Base Excess, Kamran 4 (H) 0.0 - 2.0    O2 Sat, Kamran 81 60.0 - 85.0 %   Creatinine W/GFR Point of Care   Result Value Ref Range    POC Creatinine 1.36 (H) 0.51 - 1.19 mg/dL    GFR Comment >60 >60 mL/min    GFR Non- 50 (L) >60 mL/min    GFR Comment         POCT urea (BUN)   Result Value Ref Range    POC BUN 21 8 - 26 mg/dL   Lactic Acid, POC   Result Value Ref Range    POC Lactic Acid 1.58 (H) 0.56 - 1.39 mmol/L   POCT Glucose   Result Value Ref Range    POC Glucose 235 (H) 74 - 100 mg/dL       IMPRESSION: Labs mostly unremarkable for patient. RADIOLOGY:  XR CHEST PORTABLE   Final Result   Clear lungs. EKG  Atrial fibrillation with RVR with PVCs. Rate 134, normal axis, intervals showing widened QRS, no left bundle branch morphology noted, no acute ST elevations noted, intervals otherwise within normal limits, abnormal EKG    All EKG's are interpreted by the Emergency Department Physician who either signs or Co-signs this chart in the absence of a cardiologist.    EMERGENCY DEPARTMENT COURSE:  55-year-old male presenting with chief complaint of chest pain, lightheadedness as well as shortness of breath. On amiodarone drip started by EMS. Given nitro as well as aspirin by EMS. Patient in acute distress but nontoxic-appearing. Initial rates were in the 130s to 160s. Patient placed on [place] and had 2 IVs.  No indication for electrocardioversion at this time as patient did not have any worsening chest pain, altered mental status, leg swelling or change in blood pressure. Patient was slightly hypertensive side. Amnio bolus and drip initiated. Patient given IV Zofran as well. Patient on Xarelto as he has had A. fib in the past.  Has been consistent with his Xarelto low suspicion for acute pulmonary embolism at this time. No negation for further imaging. Chest x-ray within normal limits. Given no wide mediastinum as well as 2+ peripheral pulses low sufficient for acute dissection at this time. Patient given 2 g of magnesium as well with his amiodarone bolus. This improved his heart rate to the high 90s. Patient's chest pain improving with rate control. Cardiology consulted who recommend we start heparin at this time you know patient is on Xarelto. We will hold off on heparin bolus but heparin drip initiated. On reexamination patient's heart rate was in the 60s and patient was well-appearing in no acute distress. Patient admitted to [facility] for further management.     ED Course as of 08/22/22 2342   Mon Aug 22, 2022 2121 Discussed with cardiology will start on heparin drip. [facility]   2155 Patient admitted to [facility] [MS]      ED Course User Index  [MS] Inga Rao DO       No notes of [facility] Admission Criteria type on file. PROCEDURES:  N/a    CONSULTS:  IP CONSULT TO HOSPITALIST  IP CONSULT TO CARDIOLOGY    CRITICAL CARE:  N/a    ED Course as of 08/22/22 2342   Mon Aug 22, 2022   2121 Discussed with cardiology will start on heparin drip. Will admit to [facility] [MS]   2155 Patient admitted to [facility] [MS]      ED Course User Index  [MS] Inga Rao DO       FINAL IMPRESSION      1. Atrial fibrillation with RVR ([address]          DISPOSITION / PLAN     DISPOSITION Admitted 08/22/2022 10:02:27 PM      PATIENT REFERRED TO:  No follow-up provider specified.     DISCHARGE MEDICATIONS:  New Prescriptions    No medications on file       Heather Whatley DO  Emergency Medicine Resident    (Please note that portions of thisnote were completed with a voice recognition program.  Efforts were made to edit the dictations but occasionally words are mis-transcribed.)        Inga Rao DO  Resident  08/22/22 2342

## 2022-08-23 NOTE — ED NOTES
The following labs labeled with pt sticker and tubed to lab:     [] Blue     [] Lavender   [] on ice  [x] Green/yellow  [] Green/black [] on ice  [] Yellow  [] Red  [] Pink      [] COVID-19 swab    [] Rapid  [] PCR  [] Flu Swab  [] Strep Swab  [] Peds Viral Panel     [] Urine Sample  [] Pelvic Cultures  [] Blood Cultures   [] Wound Cultures          Doron Elkins RN  08/22/22 2949

## 2022-08-23 NOTE — ED PROVIDER NOTES
[facility]     Emergency Department     Faculty Note/ Attestation      Pt Name: Sherrie Garcia                                       MRN: [id number]  Lacygfserenity 1935  Date of evaluation: 8/22/2022    Patients PCP:    Lucas Pena MD      Attestation  I performed a history and physical examination of the patient and discussed management with the resident. I reviewed the residents note and agree with the documented findings and plan of care. Any areas of disagreement are noted on the chart. I was personally present for the key portions of any procedures. I have documented in the chart those procedures where I was not present during the key portions. I have reviewed the emergency nurses triage note. I agree with the chief complaint, past medical history, past surgical history, allergies, medications, social and family history as documented unless otherwise noted below. For Physician Assistant/ Nurse Practitioner cases/documentation I have personally evaluated this patient and have completed at least one if not all key elements of the E/M (history, physical exam, and MDM). Additional findings are as noted. Initial Screens:             Vitals: There were no vitals filed for this visit. CHIEF COMPLAINT     No chief complaint on file. DIAGNOSTIC RESULTS             RADIOLOGY:   No orders to display         LABS:  Labs Reviewed - No data to display      EMERGENCY DEPARTMENT COURSE:     -------------------------   ,  ,  ,        Comments    Prior episode of A-fib, on xarelto  Hx of MI, had CP similar to prior MI  Given NTG  Started on amio drip by EMS, had runs of V-tach  V-tach improved with amio, still tachy in A-fib    Will continue drip, serial trops, cardiac w/u, admit    10:35 PM EDT  Rate came down to 60, still in A. fib, bolus and drip of amiodarone seems to have corrected his symptoms. Chest pain has resolved.   Initial troponin was mildly elevated in the low teens, cardiology consulted and recommends heparin. He is on Xarelto but given his new symptoms heparin seems reasonable. Blood pressure stable, will admit to monitored unit    CRITICAL CARE: There was a high probability of clinically significant/life threatening deterioration in this patient's condition which required my urgent intervention. Total critical care time was 45 minutes. This excludes any time for separately reportable procedures.        (Please note that portions of this note were completed with a voice recognition program.  Efforts were made to edit the dictations but occasionally words are mis-transcribed.)      Cheryle Simon MD,, MD  Attending Emergency Physician          Cheryle Simon MD  08/22/22 0853

## 2022-08-24 ENCOUNTER — APPOINTMENT (OUTPATIENT)
Dept: GENERAL RADIOLOGY | Age: 87
DRG: 287 | End: 2022-08-24
Payer: MEDICARE

## 2022-08-24 ENCOUNTER — APPOINTMENT (OUTPATIENT)
Dept: NUCLEAR MEDICINE | Age: 87
DRG: 287 | End: 2022-08-24
Payer: MEDICARE

## 2022-08-24 LAB
EKG ATRIAL RATE: 66 BPM
EKG Q-T INTERVAL: 310 MS
EKG QRS DURATION: 98 MS
EKG QTC CALCULATION (BAZETT): 462 MS
EKG R AXIS: 31 DEGREES
EKG T AXIS: 144 DEGREES
EKG VENTRICULAR RATE: 134 BPM
GLUCOSE BLD-MCNC: 108 MG/DL (ref 75–110)
GLUCOSE BLD-MCNC: 154 MG/DL (ref 75–110)
GLUCOSE BLD-MCNC: 166 MG/DL (ref 75–110)
GLUCOSE BLD-MCNC: 236 MG/DL (ref 75–110)
LV EF: 51 %
LVEF MODALITY: NORMAL
PARTIAL THROMBOPLASTIN TIME: 28.6 SEC (ref 20.5–30.5)
PARTIAL THROMBOPLASTIN TIME: 35.7 SEC (ref 20.5–30.5)

## 2022-08-24 PROCEDURE — 2580000003 HC RX 258: Performed by: NURSE PRACTITIONER

## 2022-08-24 PROCEDURE — 99232 SBSQ HOSP IP/OBS MODERATE 35: CPT | Performed by: INTERNAL MEDICINE

## 2022-08-24 PROCEDURE — 6370000000 HC RX 637 (ALT 250 FOR IP): Performed by: INTERNAL MEDICINE

## 2022-08-24 PROCEDURE — 3430000000 HC RX DIAGNOSTIC RADIOPHARMACEUTICAL: Performed by: NURSE PRACTITIONER

## 2022-08-24 PROCEDURE — 78452 HT MUSCLE IMAGE SPECT MULT: CPT

## 2022-08-24 PROCEDURE — A9500 TC99M SESTAMIBI: HCPCS | Performed by: NURSE PRACTITIONER

## 2022-08-24 PROCEDURE — 6360000002 HC RX W HCPCS: Performed by: NURSE PRACTITIONER

## 2022-08-24 PROCEDURE — 85730 THROMBOPLASTIN TIME PARTIAL: CPT

## 2022-08-24 PROCEDURE — 82947 ASSAY GLUCOSE BLOOD QUANT: CPT

## 2022-08-24 PROCEDURE — 2060000000 HC ICU INTERMEDIATE R&B

## 2022-08-24 PROCEDURE — 36415 COLL VENOUS BLD VENIPUNCTURE: CPT

## 2022-08-24 PROCEDURE — 93017 CV STRESS TEST TRACING ONLY: CPT

## 2022-08-24 PROCEDURE — 71045 X-RAY EXAM CHEST 1 VIEW: CPT

## 2022-08-24 PROCEDURE — 6370000000 HC RX 637 (ALT 250 FOR IP): Performed by: NURSE PRACTITIONER

## 2022-08-24 RX ORDER — SODIUM CHLORIDE 9 MG/ML
500 INJECTION, SOLUTION INTRAVENOUS CONTINUOUS PRN
Status: DISCONTINUED | OUTPATIENT
Start: 2022-08-24 | End: 2022-08-24 | Stop reason: ALTCHOICE

## 2022-08-24 RX ORDER — NITROGLYCERIN 0.4 MG/1
0.4 TABLET SUBLINGUAL EVERY 5 MIN PRN
Status: DISCONTINUED | OUTPATIENT
Start: 2022-08-24 | End: 2022-08-24 | Stop reason: ALTCHOICE

## 2022-08-24 RX ORDER — AMINOPHYLLINE DIHYDRATE 25 MG/ML
50 INJECTION, SOLUTION INTRAVENOUS PRN
Status: DISCONTINUED | OUTPATIENT
Start: 2022-08-24 | End: 2022-08-24 | Stop reason: ALTCHOICE

## 2022-08-24 RX ORDER — SODIUM CHLORIDE 0.9 % (FLUSH) 0.9 %
10 SYRINGE (ML) INJECTION PRN
Status: DISCONTINUED | OUTPATIENT
Start: 2022-08-24 | End: 2022-08-29 | Stop reason: HOSPADM

## 2022-08-24 RX ORDER — METOPROLOL TARTRATE 5 MG/5ML
5 INJECTION INTRAVENOUS EVERY 5 MIN PRN
Status: DISCONTINUED | OUTPATIENT
Start: 2022-08-24 | End: 2022-08-24 | Stop reason: ALTCHOICE

## 2022-08-24 RX ORDER — ATROPINE SULFATE 0.1 MG/ML
0.5 INJECTION INTRAVENOUS EVERY 5 MIN PRN
Status: DISCONTINUED | OUTPATIENT
Start: 2022-08-24 | End: 2022-08-24 | Stop reason: ALTCHOICE

## 2022-08-24 RX ORDER — SODIUM CHLORIDE 0.9 % (FLUSH) 0.9 %
5-40 SYRINGE (ML) INJECTION PRN
Status: DISCONTINUED | OUTPATIENT
Start: 2022-08-24 | End: 2022-08-24 | Stop reason: ALTCHOICE

## 2022-08-24 RX ADMIN — REGADENOSON 0.4 MG: 0.08 INJECTION, SOLUTION INTRAVENOUS at 12:59

## 2022-08-24 RX ADMIN — TETRAKIS(2-METHOXYISOBUTYLISOCYANIDE)COPPER(I) TETRAFLUOROBORATE 50 MILLICURIE: 1 INJECTION, POWDER, LYOPHILIZED, FOR SOLUTION INTRAVENOUS at 14:15

## 2022-08-24 RX ADMIN — RIVAROXABAN 20 MG: 20 TABLET, FILM COATED ORAL at 17:59

## 2022-08-24 RX ADMIN — AMLODIPINE BESYLATE 5 MG: 5 TABLET ORAL at 08:14

## 2022-08-24 RX ADMIN — TETRAKIS(2-METHOXYISOBUTYLISOCYANIDE)COPPER(I) TETRAFLUOROBORATE 13.1 MILLICURIE: 1 INJECTION, POWDER, LYOPHILIZED, FOR SOLUTION INTRAVENOUS at 12:59

## 2022-08-24 RX ADMIN — CLOPIDOGREL 75 MG: 75 TABLET, FILM COATED ORAL at 08:14

## 2022-08-24 RX ADMIN — AMIODARONE HYDROCHLORIDE 200 MG: 200 TABLET ORAL at 08:14

## 2022-08-24 RX ADMIN — AMIODARONE HYDROCHLORIDE 200 MG: 200 TABLET ORAL at 19:36

## 2022-08-24 RX ADMIN — SODIUM CHLORIDE, PRESERVATIVE FREE 10 ML: 5 INJECTION INTRAVENOUS at 19:36

## 2022-08-24 RX ADMIN — SODIUM CHLORIDE, PRESERVATIVE FREE 10 ML: 5 INJECTION INTRAVENOUS at 08:14

## 2022-08-24 RX ADMIN — SODIUM CHLORIDE, PRESERVATIVE FREE 10 ML: 5 INJECTION INTRAVENOUS at 14:15

## 2022-08-24 RX ADMIN — SODIUM CHLORIDE, PRESERVATIVE FREE 10 ML: 5 INJECTION INTRAVENOUS at 12:59

## 2022-08-24 RX ADMIN — SODIUM CHLORIDE, PRESERVATIVE FREE 10 ML: 5 INJECTION INTRAVENOUS at 13:00

## 2022-08-24 RX ADMIN — INSULIN GLARGINE 15 UNITS: 100 INJECTION, SOLUTION SUBCUTANEOUS at 21:00

## 2022-08-24 RX ADMIN — ATORVASTATIN CALCIUM 80 MG: 80 TABLET, FILM COATED ORAL at 19:36

## 2022-08-24 ASSESSMENT — PAIN SCALES - GENERAL
PAINLEVEL_OUTOF10: 0

## 2022-08-24 NOTE — PROGRESS NOTES
Physician Progress Note      PATIENT:               Noelle Leavitt  CSN #:                  [id number]  :                       1935  ADMIT DATE:       2022 8:22 PM  [facility] DATE:  Sharon Sanon  PROVIDER #:        Melvin BOLIVAR          QUERY TEXT:    Patient admitted with A fib with RVR presented with chest pain . Noted   documentation of both suspect demand ischemia with possible component of   unstable angina with CAD. also noted NSTEMI with st depression and chest pain   d/t a fib with rvr and ckd. Plan is outpatient stress test. per lab troponin   13>28>97, EKG st depression . If possible, please clarify one of the   following: The medical record reflects the following:  Risk Factors: a fib, htn, hld hx of mi  Clinical Indicators: admitted with A fib with RVR presented with chest pain . Noted documentation of both suspect demand ischemia with possible component   of unstable angina with CAD. also noted NSTEMI with st depression and chest   pain d/t a fib with rvr and ckd. Plan is outpatient stress test. per lab   troponin 13>28>97, EKG st depression  Treatment: lab monitoring, cardiology consult, iv heparin, iv amiodarone    Thank Pema Ashton RN BSN  CCDS  Email [email or web address]  Cell [phone]  office hours M-F 6am to 2:30pm  Options provided:  -- chest pain d/t demand ischemia with unstable angina with cad confirmed  -- chest pain d/t A FIB with RVR confirmed  -- chest pain d/t NSTEMI confirmed  -- Other - I will add my own diagnosis  -- Disagree - Not applicable / Not valid  -- Disagree - Clinically unable to determine / Unknown  -- Refer to Clinical Documentation Reviewer    PROVIDER RESPONSE TEXT:    This patients chest pain is d/t A FIB with RVR which is confirmed    Query created by:  Gerardo Worrell on 2022 7:50 AM      Electronically signed by:  Melvin BOLIVAR 2022 10:01 AM

## 2022-08-24 NOTE — PROGRESS NOTES
[facility]   Progress Note                   Date:   8/24/2022  Patient name: Wilton Cotton  Date of admission:  8/22/2022  8:22 PM  MRN:   [id number]  YOB: 1935  PCP: Morteza Monterroso MD    Reason for Admission: Atrial fibrillation with RVR ([address] [I48.91]    Subjective:       Clinical Changes / Abnormalities: Pt seen and examined in the room. Pt has had 2 episodes of pain overnight  He is also complaining of dizziness and leg weakness. Medications:   Scheduled Meds:   amiodarone  200 mg Oral BID    Followed by    Floy Jaci ON 8/30/2022] amiodarone  200 mg Oral Daily    rivaroxaban  20 mg Oral Daily    clopidogrel  75 mg Oral Daily    amLODIPine  5 mg Oral Daily    atorvastatin  80 mg Oral Nightly    [Held by provider] clopidogrel  75 mg Oral Daily    insulin glargine  15 Units SubCUTAneous Nightly    metoprolol succinate  12.5 mg Oral Daily    sodium chloride flush  5-40 mL IntraVENous 2 times per day    insulin lispro  0-8 Units SubCUTAneous TID WC    insulin lispro  0-4 Units SubCUTAneous Nightly     Continuous Infusions:   sodium chloride      dextrose       CBC:   Recent Labs     08/22/22 2040 08/23/22  0547   WBC 6.7 6.4   HGB 10.8* 10.0*    167     BMP:    Recent Labs     08/22/22 2040 08/22/22 2055 08/23/22  0236 08/23/22  0547 08/23/22  0744     --   --  138  --    K 4.8  --   --  4.8  --      --   --  106  --    CO2 20  --   --  22  --    BUN 23  --   --  21  --    CREATININE 1.34* 1.36*  --  1.37*  --    GLUCOSE 238*  --  234 171* 130     Hepatic:   Recent Labs     08/23/22  0547   AST 22   ALT 12   BILITOT 0.26*   ALKPHOS 83     Troponin:   Recent Labs     08/22/22 2040 08/22/22 2240 08/23/22  0951   TROPHS 13 28* 97*     BNP: No results for input(s): BNP in the last 72 hours. Lipids: No results for input(s): CHOL, HDL in the last 72 hours.     Invalid input(s): LDLCALCU  INR:   Recent Labs     08/23/22  0547   INR 1.1

## 2022-08-24 NOTE — PROCEDURES
[address] [facility]                              CARDIAC STRESS TEST    PATIENT NAME: Daria Moulton                  :        1935  MED REC NO:   [id number]                             ROOM:         ACCOUNT NO:   [de-identified]                           ADMIT DATE: 2022  PROVIDER:     Shin Wallace    DATE OF STUDY:  2022    ORDERING PROVIDER: DAVID Smith    PRIMARY CARE PROVIDER: Dr. Silva Epp: Dr. Corrigan [place]:    Indication: Chest pain    Procedure explained and consent signed. Medications: Lexiscan, 0.4 mg  Resting Heart rate: 66 bpm  Resting blood pressure:  190/67 mm/Hg  Infusion heart rate:  118 bpm  Infusion blood pressure:  190/67 mm/Hg  Resting EKG: Normal Sinus rhythm, low voltage QRS, Non-specific ST-T  wave changes  Stress heart response: Normal Response  Stress BP response: Appropriate  Stress EKG(S): No changes seen  Chest discomfort: 1-2/10 chest pressure  Ischemic EKG changes: None    IMPRESSION:  Electrocardiographically Negative Lexiscan stress study. Radio-isotope  results to follow from the department of Nuclear Medicine.            [facility]    D: 2022 14:33:49       T: 2022 14:35:01     /[id number]  Job#: [id number]     Doc#: Unknown    CC:

## 2022-08-25 ENCOUNTER — APPOINTMENT (OUTPATIENT)
Dept: ULTRASOUND IMAGING | Age: 87
DRG: 287 | End: 2022-08-25
Payer: MEDICARE

## 2022-08-25 LAB
ALBUMIN SERPL-MCNC: 3.5 G/DL (ref 3.5–5.2)
ANION GAP SERPL CALCULATED.3IONS-SCNC: 10 MMOL/L (ref 9–17)
BUN BLDV-MCNC: 18 MG/DL (ref 8–23)
CALCIUM SERPL-MCNC: 8.5 MG/DL (ref 8.6–10.4)
CHLORIDE BLD-SCNC: 106 MMOL/L (ref 98–107)
CO2: 23 MMOL/L (ref 20–31)
COMPLEMENT C3: 122 MG/DL (ref 90–180)
COMPLEMENT C4: 29 MG/DL (ref 10–40)
CREAT SERPL-MCNC: 1.43 MG/DL (ref 0.7–1.2)
CREATININE URINE: 44.8 MG/DL (ref 39–259)
EOSINOPHIL,URINE: NORMAL
FREE KAPPA/LAMBDA RATIO: 2.14 (ref 0.26–1.65)
GFR AFRICAN AMERICAN: 57 ML/MIN
GFR NON-AFRICAN AMERICAN: 47 ML/MIN
GFR SERPL CREATININE-BSD FRML MDRD: ABNORMAL ML/MIN/{1.73_M2}
GLUCOSE BLD-MCNC: 106 MG/DL (ref 75–110)
GLUCOSE BLD-MCNC: 131 MG/DL (ref 75–110)
GLUCOSE BLD-MCNC: 160 MG/DL (ref 75–110)
GLUCOSE BLD-MCNC: 208 MG/DL (ref 75–110)
GLUCOSE BLD-MCNC: 91 MG/DL (ref 70–99)
HCT VFR BLD CALC: 31.6 % (ref 40.7–50.3)
HEMOGLOBIN: 10.3 G/DL (ref 13–17)
KAPPA FREE LIGHT CHAINS QNT: 4.16 MG/DL (ref 0.37–1.94)
LAMBDA FREE LIGHT CHAINS QNT: 1.94 MG/DL (ref 0.57–2.63)
MAGNESIUM: 1.9 MG/DL (ref 1.6–2.6)
MCH RBC QN AUTO: 30.9 PG (ref 25.2–33.5)
MCHC RBC AUTO-ENTMCNC: 32.6 G/DL (ref 28.4–34.8)
MCV RBC AUTO: 94.9 FL (ref 82.6–102.9)
NRBC AUTOMATED: 0 PER 100 WBC
PARTIAL THROMBOPLASTIN TIME: 27.5 SEC (ref 20.5–30.5)
PARTIAL THROMBOPLASTIN TIME: 29.1 SEC (ref 20.5–30.5)
PARTIAL THROMBOPLASTIN TIME: 31.9 SEC (ref 20.5–30.5)
PDW BLD-RTO: 12.4 % (ref 11.8–14.4)
PHOSPHORUS: 3.4 MG/DL (ref 2.5–4.5)
PLATELET # BLD: 194 K/UL (ref 138–453)
PMV BLD AUTO: 10.9 FL (ref 8.1–13.5)
POTASSIUM SERPL-SCNC: 4.2 MMOL/L (ref 3.7–5.3)
RBC # BLD: 3.33 M/UL (ref 4.21–5.77)
SODIUM BLD-SCNC: 139 MMOL/L (ref 135–144)
TOTAL PROTEIN, URINE: 6 MG/DL
WBC # BLD: 6.7 K/UL (ref 3.5–11.3)

## 2022-08-25 PROCEDURE — 6370000000 HC RX 637 (ALT 250 FOR IP): Performed by: INTERNAL MEDICINE

## 2022-08-25 PROCEDURE — 99223 1ST HOSP IP/OBS HIGH 75: CPT | Performed by: INTERNAL MEDICINE

## 2022-08-25 PROCEDURE — 85027 COMPLETE CBC AUTOMATED: CPT

## 2022-08-25 PROCEDURE — 84155 ASSAY OF PROTEIN SERUM: CPT

## 2022-08-25 PROCEDURE — 6360000002 HC RX W HCPCS: Performed by: INTERNAL MEDICINE

## 2022-08-25 PROCEDURE — 76770 US EXAM ABDO BACK WALL COMP: CPT

## 2022-08-25 PROCEDURE — 2060000000 HC ICU INTERMEDIATE R&B

## 2022-08-25 PROCEDURE — 82570 ASSAY OF URINE CREATININE: CPT

## 2022-08-25 PROCEDURE — 86160 COMPLEMENT ANTIGEN: CPT

## 2022-08-25 PROCEDURE — 83883 ASSAY NEPHELOMETRY NOT SPEC: CPT

## 2022-08-25 PROCEDURE — 86334 IMMUNOFIX E-PHORESIS SERUM: CPT

## 2022-08-25 PROCEDURE — 84165 PROTEIN E-PHORESIS SERUM: CPT

## 2022-08-25 PROCEDURE — 36415 COLL VENOUS BLD VENIPUNCTURE: CPT

## 2022-08-25 PROCEDURE — 87205 SMEAR GRAM STAIN: CPT

## 2022-08-25 PROCEDURE — 2580000003 HC RX 258: Performed by: NURSE PRACTITIONER

## 2022-08-25 PROCEDURE — 2580000003 HC RX 258: Performed by: SURGERY

## 2022-08-25 PROCEDURE — 85730 THROMBOPLASTIN TIME PARTIAL: CPT

## 2022-08-25 PROCEDURE — 6370000000 HC RX 637 (ALT 250 FOR IP): Performed by: NURSE PRACTITIONER

## 2022-08-25 PROCEDURE — 84156 ASSAY OF PROTEIN URINE: CPT

## 2022-08-25 PROCEDURE — 83735 ASSAY OF MAGNESIUM: CPT

## 2022-08-25 PROCEDURE — 80069 RENAL FUNCTION PANEL: CPT

## 2022-08-25 PROCEDURE — 99232 SBSQ HOSP IP/OBS MODERATE 35: CPT | Performed by: INTERNAL MEDICINE

## 2022-08-25 PROCEDURE — 82947 ASSAY GLUCOSE BLOOD QUANT: CPT

## 2022-08-25 RX ORDER — ACETYLCYSTEINE 200 MG/ML
600 SOLUTION ORAL; RESPIRATORY (INHALATION) 2 TIMES DAILY
Status: DISPENSED | OUTPATIENT
Start: 2022-08-25 | End: 2022-08-27

## 2022-08-25 RX ORDER — SODIUM CHLORIDE 9 MG/ML
INJECTION, SOLUTION INTRAVENOUS CONTINUOUS
Status: DISCONTINUED | OUTPATIENT
Start: 2022-08-25 | End: 2022-08-27

## 2022-08-25 RX ADMIN — SODIUM CHLORIDE: 9 INJECTION, SOLUTION INTRAVENOUS at 09:53

## 2022-08-25 RX ADMIN — INSULIN GLARGINE 15 UNITS: 100 INJECTION, SOLUTION SUBCUTANEOUS at 21:00

## 2022-08-25 RX ADMIN — SODIUM CHLORIDE, PRESERVATIVE FREE 10 ML: 5 INJECTION INTRAVENOUS at 09:45

## 2022-08-25 RX ADMIN — Medication 600 MG: at 20:02

## 2022-08-25 RX ADMIN — SODIUM CHLORIDE, PRESERVATIVE FREE 10 ML: 5 INJECTION INTRAVENOUS at 20:02

## 2022-08-25 RX ADMIN — METOPROLOL SUCCINATE 12.5 MG: 25 TABLET, FILM COATED, EXTENDED RELEASE ORAL at 09:44

## 2022-08-25 RX ADMIN — CLOPIDOGREL 75 MG: 75 TABLET, FILM COATED ORAL at 09:44

## 2022-08-25 RX ADMIN — AMLODIPINE BESYLATE 5 MG: 5 TABLET ORAL at 09:44

## 2022-08-25 RX ADMIN — AMIODARONE HYDROCHLORIDE 200 MG: 200 TABLET ORAL at 09:44

## 2022-08-25 RX ADMIN — AMIODARONE HYDROCHLORIDE 200 MG: 200 TABLET ORAL at 20:02

## 2022-08-25 RX ADMIN — ATORVASTATIN CALCIUM 80 MG: 80 TABLET, FILM COATED ORAL at 20:02

## 2022-08-25 NOTE — PROGRESS NOTES
[facility]   Progress Note                   Date:   8/25/2022  Patient name: Rabia Live  Date of admission:  8/22/2022  8:22 PM  MRN:   [id number]  YOB: 1935  PCP: Sauol Tucker MD    Reason for Admission: Atrial fibrillation with RVR ([facility] [address] [I48.91]    Subjective:       Clinical Changes / Abnormalities:   Patient seen and examined. Denies chest pain or shortness of breath. Had an episode with dizziness yesterday with increased HR per patient   Tele/vitals/labs reviewed . Sinus rhythm /dar on monitor. Discussed case with RN. Medications:   Scheduled Meds:   amiodarone  200 mg Oral BID    Followed by    Efren Tee ON 8/30/2022] amiodarone  200 mg Oral Daily    rivaroxaban  20 mg Oral Daily    clopidogrel  75 mg Oral Daily    amLODIPine  5 mg Oral Daily    atorvastatin  80 mg Oral Nightly    [Held by provider] clopidogrel  75 mg Oral Daily    insulin glargine  15 Units SubCUTAneous Nightly    metoprolol succinate  12.5 mg Oral Daily    sodium chloride flush  5-40 mL IntraVENous 2 times per day    insulin lispro  0-8 Units SubCUTAneous TID WC    insulin lispro  0-4 Units SubCUTAneous Nightly     Continuous Infusions:   sodium chloride      dextrose       CBC:   Recent Labs     08/22/22 2040 08/23/22  0547 08/25/22  0318   WBC 6.7 6.4 6.7   HGB 10.8* 10.0* 10.3*    167 194       BMP:    Recent Labs     08/22/22 2040 08/22/22 2055 08/23/22  0236 08/23/22  0547 08/23/22  0744 08/25/22  0318     --   --  138  --  139   K 4.8  --   --  4.8  --  4.2     --   --  106  --  106   CO2 20  --   --  22  --  23   BUN 23  --   --  21  --  18   CREATININE 1.34* 1.36*  --  1.37*  --  1.43*   GLUCOSE 238*  --    < > 171* 130 91    < > = values in this interval not displayed.        Hepatic:   Recent Labs     08/23/22  0547   AST 22   ALT 12   BILITOT 0.26*   ALKPHOS 83       Troponin:   Recent Labs     08/22/22 2040 08/22/22 2240 08/23/22  0951   TROPHS 13 28* 97*       BNP: No results for input(s): BNP in the last 72 hours. Lipids: No results for input(s): CHOL, HDL in the last 72 hours. Invalid input(s): LDLCALCU  INR:   Recent Labs     08/23/22  0547   INR 1.1         Objective:   Vitals: BP (!) 146/67   Pulse 50   Temp 98 °F (36.7 °C) (Oral)   Resp 18   Ht 5' 10\" (1.778 m)   Wt 180 lb (81.6 kg)   SpO2 93%   BMI 25.83 kg/m²   General appearance: alert and cooperative with exam  HEENT: Head: Normocephalic, no lesions, without obvious abnormality. Neck: no JVD, trachea midline, no adenopathy  Lungs: Clear to auscultation  Heart: Regular rate and rhythm, s1/s2 auscultated, no murmurs  Abdomen: soft, non-tender, bowel sounds active  Extremities: no edema  Neurologic: not done        Assessment / Acute Cardiac Problems:   NSTEMI, troponins are 13 and 28, St depression in V4-V6 and AVL.   A. 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)     Essential hypertension     CAD (coronary artery disease)     Bradycardia     Demand ischemia (HCC)     Unstable angina ([facility])     Anxiety     Chronic rhinitis     Diabetic retinopathy (HCC)     Dizziness     Hyperlipidemia     Insomnia     Lumbar spondylolysis     Stroke syndrome     AK (actinic keratosis)     Chronic sinusitis     Anemia     Arthritis     Paroxysmal atrial fibrillation ([facility])     S/P skin biopsy     Squamous cell carcinoma in situ     Atrial fibrillation with RVR ([facility])     Lactic acid acidosis     CKD (chronic kidney disease) stage 3, GFR 30-59 ml/min ([facility])      Plan of Treatment: Abnormal stress test - discussed with Dr. Vasquez Fernandez and patient - will plan  for cath after nephrology clearance as creatinine trending up today and start iv fluid - I have discussed risks (including but not limited to vascular injury, infection, hematoma, contrast induced kidney dysfunction, CVA and MI), benefits, alternatives in detail. All questions answered. Patient agrees to proceed. Afib. NSR currenlty. Continue BB and Xarelto. Dizziness. Check orthostatics.         Electronically signed by SANAM Plata NP on 8/25/2022 at [address] [facility]. [phone]

## 2022-08-25 NOTE — PROGRESS NOTES
Dr Gena Ibarra notified that patient had Jannetta Erps last night.   Procedure canceled and with hopes to be rescheduled for Friday

## 2022-08-25 NOTE — CARE COORDINATION
Met with patient to discuss transitional planning. He is returning home at discharge.  He denies needs

## 2022-08-25 NOTE — PROGRESS NOTES
Patient admitted, consent signed and questions answered. Patient ready for procedure. Call light to reach with side rails up 2 of 2. Bilateral groin areas clipped with writer and Sondra RN present. Family at bedside with patient. History and physical complete.

## 2022-08-25 NOTE — PROGRESS NOTES
SPIRITUAL CARE DEPARTMENT - [facility]  PROGRESS NOTE    Shift date: 08/25/22  Shift day: Thursday   Shift # 2    Room # 2001/2001-01   Name: Braxton Otto                Scientologist:    Place of Mosque:     Referral: Routine Visit    Admit Date & Time: 8/22/2022  8:22 PM    Assessment:  Braxton Otto is a 80 y.o. male       Intervention:  Writer introduced self and title as . Patient appeared to welcome  visit and engaged in conversation about his health and its impact. Writer offered space for patient  to express feelings, needs, and concerns and provided a ministry presence.  listened and validated patients feelings and concerns.  offered prayer for spiritual comfort and support which was accepted. Outcome:  Patient expressed gratitude for visit. Plan:  Chaplains will remain available to offer spiritual and emotional support as needed.       Electronically signed by Preet Castillo on 8/25/2022 at 6:10 PM.  [address] [facility]  [phone]

## 2022-08-25 NOTE — PROGRESS NOTES
[facility]  Office: [address], DO, Delta Chappell, DO, Myronallison Youssef, DO, Ilamax Porter, DO, Raine Culp MD, Jatin Cano MD, Mary Kate Cross MD, Nikki Burgos MD,  Elisabeth Osuna MD, Evaristo Ramirez MD, Frank Simmonds, DO, Holden Zarate MD,  Alethea Coyne MD, Dalila Allen MD, Dillon Villanueva DO, Justin Springer MD, Sena Guerrero MD, Kimmy Yadav MD, Cynthia Matamoros DO, Marciano Bernheim, MD, Lizette Tejada MD, Suzie Reyse, CNP,  Ciara De La Cruz, CNP, Janice Hines, CNP, Luis Alberto Mejias, CNP, Tre Torrez PA-C, Nicole Shepard, DNP, Woodward Brittle, CNP, Génesis Griggs, CNP, Neeraj Gomez, CNP, Mateus Wei, CNP, Kathrine Brand, CNP, Jorge A Lees, CNS, Jimmy Sanz, DNP, Marcel Horton, CNP, Carrie Ramirez, CNP, [facility], [address] [place] [address] 12    Progress Note    8/25/2022    11:27 AM    Name:   Nicole Mathew  MRN:     [id number]     [place]:      [de-identified]   Room:   2001/2001-01   Day:  3  Admit Date:  8/22/2022  8:22 PM    PCP:   Laura Stoll MD  Code Status:  Full Code    Subjective:     C/C:   Chief Complaint   Patient presents with    Chest Pain     Interval History Status: not changed. Patient seen and examined, no issues overnight. Patient had abnormal stress test yesterday and is planned for cardiac cath although patient now needs nephrology clearance. Patient otherwise asymptomatic    Brief History:     From H&P  Nicole Mathew is a 80 y.o.  male with history of coronary disease status post prior MI in 2018, prior stroke, type 2 diabetes, CKD 3 who presented with acute symptomatic tachycardia with chest pain with Chest Pain   and is admitted to the hospital for the management of Atrial fibrillation with RVR ([facility] [address] Patient with prior episode of chest pain 2 to 3 weeks ago status post evaluation at [facility].   Was seen in follow-up with [facility] earlier in August.  Recommended for stress test which was scheduled for September. Patient describes abrupt onset of chest pain earlier this evening after standing up in the kitchen after eating. Symptoms persisted for 2-1/2 hours constant. Currently resolved since arrival to ED status post heparin/amio drip. Pain was initially Sharp aching quality midsternum with radiation to the left shoulder. Denied any prior episode of chest pain shortness of breath when he was doing yard work earlier that morning. Was out in the yard for approximately 30 minutes this morning. Did denies any heavy lifting. With chest pain episode patient did become acutely diaphoretic with nausea with mild pleurisy. + Tachycardia.  + Lightheadedness but denies near syncope or collapse. Denies prior DVT PE or recent travel. Status post heparin/amio drip in ED. In ED patient found to have heart rates in the 130s to 150s with A. fib status post loading with amiodarone drip. Chest pain has resolved since controlling tachycardia. Denies any prior issues with tachycardia, palpitations. Denies fevers or chills or flulike symptoms. Denies positional changes. Review of Systems:     Constitutional:  negative for chills, fevers, sweats  Respiratory:  negative for cough, dyspnea on exertion, shortness of breath, wheezing  Cardiovascular:  negative for chest pain, chest pressure/discomfort, lower extremity edema, palpitations  Gastrointestinal:  negative for abdominal pain, constipation, diarrhea, nausea, vomiting  Neurological:  negative for dizziness, headache    Medications: Allergies:     Allergies   Allergen Reactions    Sulfa Antibiotics Anaphylaxis       Current Meds:   Scheduled Meds:    amiodarone  200 mg Oral BID    Followed by    Obey Baltazar ON 8/30/2022] amiodarone  200 mg Oral Daily    rivaroxaban  20 mg Oral Daily    clopidogrel  75 mg Oral Daily    amLODIPine  5 mg Oral Daily    atorvastatin  80 mg Oral Nightly    [Held by provider] clopidogrel  75 mg Oral Daily    insulin glargine  15 Units SubCUTAneous Nightly    metoprolol succinate  12.5 mg Oral Daily    sodium chloride flush  5-40 mL IntraVENous 2 times per day    insulin lispro  0-8 Units SubCUTAneous TID WC    insulin lispro  0-4 Units SubCUTAneous Nightly     Continuous Infusions:    sodium chloride 100 mL/hr at 22 3976    sodium chloride      dextrose       PRN Meds: sodium chloride flush, sodium chloride flush, sodium chloride flush, sodium chloride, ondansetron **OR** ondansetron, polyethylene glycol, acetaminophen **OR** acetaminophen, glucose, dextrose bolus **OR** dextrose bolus, glucagon (rDNA), dextrose    Data:     Past Medical History:   has a past medical history of Acute myocardial ischemia (HCC), Acute sinusitis, Cerebral artery occlusion with cerebral infarction [facility]), Chest pain, Diabetes mellitus ([facility] [address] H/O cataract, Hyperlipidemia, Hypertension, MI (myocardial infarction) ([facility] [address] Neck stiffness, Partial small bowel obstruction ([facility] [address] Peyronie's disease, Rotator cuff tendonitis, and TIA (transient ischemic attack). Social History:   reports that he has quit smoking. He has a 4.00 pack-year smoking history. He has never used smokeless tobacco. He reports that he does not drink alcohol and does not use drugs. Family History:   Family History   Problem Relation Age of Onset    Cancer Mother         oral    Stroke Father     High Blood Pressure Brother     Heart Disease Brother     Cancer Maternal Grandmother     Diabetes type 2  Son     Heart Attack Son     Heart Disease Maternal Grandfather     Stroke Paternal Grandmother     Heart Disease Paternal Grandfather         ?        Vitals:  BP (!) 140/77   Pulse 87   Temp 98.4 °F (36.9 °C) (Oral)   Resp 18   Ht 5' 10\" (1.778 m)   Wt 180 lb (81.6 kg)   SpO2 98%   BMI 25.83 kg/m²   Temp (24hrs), Av.1 °F (36.7 °C), Min:97.9 °F (36.6 °C), Max:98.4 °F (36.9 °C)    Recent Labs 08/24/22  1705 08/24/22 2029 08/25/22  0623 08/25/22  1051   POCGLU 166* 236* 106 131*         I/O (24Hr): Intake/Output Summary (Last 24 hours) at 8/25/2022 1127  Last data filed at 8/25/2022 0920  Gross per 24 hour   Intake --   Output 1250 ml   Net -1250 ml         Labs:  Hematology:  Recent Labs     08/22/22 2040 08/23/22  0547 08/25/22  0318   WBC 6.7 6.4 6.7   RBC 3.51* 3.23* 3.33*   HGB 10.8* 10.0* 10.3*   HCT 33.2* 30.0* 31.6*   MCV 94.6 92.9 94.9   MCH 30.8 31.0 30.9   MCHC 32.5 33.3 32.6   RDW 12.5 12.3 12.4    167 194   MPV 11.0 10.8 10.9   INR  --  1.1  --        Chemistry:  Recent Labs     08/22/22 2040 08/22/22 2055 08/22/22 2240 08/23/22  0236 08/23/22  0547 08/23/22  0744 08/23/22  0951 08/25/22  0318     --   --   --  138  --   --  139   K 4.8  --   --   --  4.8  --   --  4.2     --   --   --  106  --   --  106   CO2 20  --   --   --  22  --   --  23   GLUCOSE 238*  --   --    < > 171* 130  --  91   BUN 23  --   --   --  21  --   --  18   CREATININE 1.34* 1.36*  --   --  1.37*  --   --  1.43*   MG  --   --   --   --  2.2  --   --  1.9   ANIONGAP 13  --   --   --  10  --   --  10   LABGLOM 51* 50*  --   --  49*  --   --  47*   GFRAA >60  --   --   --  60*  --   --  57*   CALCIUM 8.7  --   --   --  8.4*  --   --  8.5*   PHOS  --   --   --   --   --   --   --  3.4   PROBNP 1,009*  --   --   --  1,706*  --   --   --    TROPHS 13  --  28*  --   --   --  97*  --     < > = values in this interval not displayed.        Recent Labs     08/23/22  0547 08/23/22  0744 08/24/22  0730 08/24/22  1120 08/24/22  1705 08/24/22 2029 08/25/22  0318 08/25/22  0623 08/25/22  1051   PROT 6.2*  --   --   --   --   --   --   --   --    LABALBU 3.7  --   --   --   --   --  3.5  --   --    AST 22  --   --   --   --   --   --   --   --    ALT 12  --   --   --   --   --   --   --   --    ALKPHOS 83  --   --   --   --   --   --   --   --    BILITOT 0.26*  --   --   --   --   --   --   --   --    POCGLU --    < > 108 154* 166* 236*  --  106 131*    < > = values in this interval not displayed. ABG:No results found for: POCPH, PHART, PH, POCPCO2, ZIU7FMH, PCO2, POCPO2, PO2ART, PO2, POCHCO3, UMQ1VYY, HCO3, NBEA, PBEA, BEART, BE, THGBART, THB, LEJ7GXB, NONJ2YGZ, O5XVNXPJ, O2SAT, FIO2  No results found for: SPECIAL  No results found for: CULTURE    Radiology:  XR CHEST PORTABLE    Result Date: 8/22/2022  Clear lungs. Physical Examination:        General appearance:  alert, cooperative and no distress  Mental Status:  oriented to person, place and time and normal affect  Lungs:  clear to auscultation bilaterally, normal effort  Heart: Irregularly irregular rate rhythm no murmurs rubs gallops  Abdomen:  soft, nontender, nondistended, normal bowel sounds, no masses, hepatomegaly, splenomegaly  Extremities:  no edema, redness, tenderness in the calves  Skin:  no gross lesions, rashes, induration    Assessment:        Hospital Problems             Last Modified POA    * (Principal) Atrial fibrillation with RVR ([facility]) 8/23/2022 Yes    Lactic acid acidosis 8/23/2022 Yes    CKD (chronic kidney disease) stage 3, GFR 30-59 ml/min ([facility]) 8/23/2022 Yes    Controlled type 2 diabetes with neuropathy ([address] 8/23/2022 Yes    CAD (coronary artery disease) 8/23/2022 Yes    Demand ischemia ([address] 8/23/2022 Yes    Unstable angina ([address] 8/23/2022 Yes    Anemia 8/23/2022 Yes     Plan:        Atrial fibrillation with RVR-patient was on amiodarone and Toprol-XL, bradycardic this morning and after shower became very dizzy. Blood pressure in the 150s and otherwise no obvious etiology for dizziness besides bradycardia. Discussed with cardiology, they will come to reevaluate. Apparently sent for stress testing later this morning, awaiting results. CAD  Type 2 diabetes  Plan:  Stress test abnormal, cath when cleared by nephrology   Continue IVF.        Kailey Montalvo DO  8/25/2022  11:27 AM

## 2022-08-25 NOTE — CONSULTS
Renal Consult Note    Patient :  Tristan Dominguez; 80 y.o. MRN# [id number]  Location:  2001/2001-01  Attending:  Carlos Hubbard DO  Admit Date:  8/22/2022   Hospital Day: 3    Reason for Consult:     Asked by Dr Carlos Hubbard DO to see for RENE/Elevated Creatinine. History Obtained From:     patient    History of Present Illness:     Tristan Dominguez; 80 y.o. male with past medical history as mentioned below. Known history of type 2 diabetes diagnosed more than 20 years ago with history of retinopathy requiring laser photocoagulation on the left side about 10 years ago. Known history of hypertension coronary artery disease history of stent placement in 2018, preserved ejection fraction. Also has known history of chronic kidney disease stage III baseline creatinine 1.2-1.3 does not follow-up with nephrology. Hemoglobin A1c's have been in the 7-7.5 range. No recent urinalysis or urine protein creatinine ratio available. Patient presented to the hospital with complains of chest pain which started about 2 to 2-1/2 hours prior to admission. Pain is described as retrosternal dull aching without any radiation. Was associated with dizziness and diaphoresis. Pain happened at rest.  Prior to that he was in the yard for about 30 minutes that morning and did not have any symptoms. On presentation to the ER his symptoms are persisting. Troponin peaked at about 97. Stress test was done which showed reversible ischemia in the lateral and inferior walls. He had a similar episode a few weeks ago which required hospitalization to [facility].  He was seen by cardiology and discharged home as symptoms had subsided and troponins were normal. He was advised outpatient stress test which was scheduled in September this year. Based on the above findings cardiology plans cardiac catheterization today. Nephrology has been consulted for clearance prior to cardiac catheterization.   Labs today showed a creatinine of 1.4 potassium 4.2 bicarb 23 magnesium 1.9 calcium 8.5 phosphorus 3.4, creatinine presentation was 1.3 and creatinine in March of this year was 1.2. Patient admits to polyuria since admission to the hospital.  He did have orthostatics on admission with a drop in 30 points on standing. He denies any history of dysuria hematuria hesitancy urgency frequency. No nausea vomiting or diarrhea. No fever or chills. No cough, hemoptysis. No skin rashes or photosensitivity. Previous echocardiograms in 2018 have shown an ejection fraction of 55%. Home medicines were reviewed, patient has been on lisinopril also on metformin  No history of recent contrast exposure, No h/o prolonged NSAIDs use in the past, No h/o nephrolithiasis, No recent skin rashes or arthralgias, No hematuria or pyuria noticed in the recent past. Doesn't report any reduction in the urine output recently. Non report of any obstructive urinary symptoms (urgency, frequency, weak stream, straining while urination). No h/o recurrent UTIs in the past.    Past History/Allergies? Social History:     Past Medical History:   Diagnosis Date    Acute myocardial ischemia ([facility] 12/11/2015    Acute sinusitis 12/11/2015    Cerebral artery occlusion with cerebral infarction [facility])     Chest pain 10/5/2015    Diabetes mellitus ([address]     H/O cataract     Hyperlipidemia     Hypertension     MI (myocardial infarction) ([facility] [address] 1985    Neck stiffness 12/11/2015    Partial small bowel obstruction ([facility] [address] 11/7/2019    Peyronie's disease     Rotator cuff tendonitis 12/11/2015    TIA (transient ischemic attack) 1998       Allergies   Allergen Reactions    Sulfa Antibiotics Anaphylaxis       Social History     Socioeconomic History    Marital status:      Spouse name: Not on file    Number of children: Not on file    Years of education: Not on file    Highest education level: Not on file   Occupational History    Not on file   Tobacco Use    Smoking status: Former     Packs/day: 0.50     Years: 8.00     Pack years: 4.00     Types: Cigarettes    Smokeless tobacco: Never   Substance and Sexual Activity    Alcohol use: No    Drug use: No    Sexual activity: Not on file   Other Topics Concern    Not on file   Social History Narrative    Not on file     Social Determinants of Health     Financial Resource Strain: Low Risk     Difficulty of Paying Living Expenses: Not hard at all   Food Insecurity: No Food Insecurity    Worried About Running Out of Food in the Last Year: Never true    Ran Out of Food in the Last Year: Never true   Transportation Needs: Not on file   Physical Activity: Not on file   Stress: Not on file   Social Connections: Not on file   Intimate Partner Violence: Not on file   Housing Stability: Not on file       Family History:        Family History   Problem Relation Age of Onset    Cancer Mother         oral    Stroke Father     High Blood Pressure Brother     Heart Disease Brother     Cancer Maternal Grandmother     Diabetes type 2  Son     Heart Attack Son     Heart Disease Maternal Grandfather     Stroke Paternal Grandmother     Heart Disease Paternal Grandfather         ? Outpatient Medications:     Medications Prior to Admission: magnesium oxide (MAG-OX) 400 MG tablet, Take 1 tablet by mouth in the morning.   ULTICARE SHORT PEN NEEDLES 31G X 8 MM MISC, INJECT 1 NEEDLE INTO THE SKIN DAILY  Accu-Chek FastClix Lancets MISC, USE ONE LANCET TO TEST TWICE A DAY AND AS NEEDED  blood glucose test strips (FREESTYLE TEST STRIPS) strip, USE ONE STRIP TO TEST TWICE A DAY AND AS NEEDED  atorvastatin (LIPITOR) 80 MG tablet, TAKE 1 TABLET NIGHTLY  clopidogrel (PLAVIX) 75 MG tablet, TAKE 1 TABLET DAILY  metoprolol succinate (TOPROL XL) 25 MG extended release tablet, Take 0.5 tablets by mouth daily  LANTUS SOLOSTAR 100 UNIT/ML injection pen, INJECT 15 UNITS INTO THE SKIN NIGHTLY  XARELTO 20 MG TABS tablet, TAKE 1 TABLET DAILY  metFORMIN (GLUCOPHAGE-XR) 500 MG extended release tablet, Take 3 tablets by mouth daily (with breakfast)  amLODIPine (NORVASC) 5 MG tablet, TAKE 1 TABLET DAILY  lisinopril-hydroCHLOROthiazide (PRINZIDE;ZESTORETIC) 20-12.5 MG per tablet, TAKE 1 TABLET DAILY  Blood Glucose Monitoring Suppl (FREESTYLE FREEDOM LITE) w/Device KIT, 1 kit by Does not apply route daily as needed (check sugars bid and prn)  acetaminophen (TYLENOL) 500 MG tablet, Take 500 mg by mouth every 6 hours as needed for Pain  Lancet Device MISC, 1 Device by Does not apply route once for 1 dose  nitroGLYCERIN (NITROSTAT) 0.4 MG SL tablet, Place 1 tablet under the tongue every 5 minutes as needed for Chest pain  Coenzyme Q10 (CO Q-10) 30 MG CAPS, Take 30 mg by mouth daily  Ascorbic Acid (VITAMIN C) 250 MG tablet, Take 500 mg by mouth daily  ferrous sulfate 325 (65 FE) MG tablet, Take 325 mg by mouth daily     Current Medications:     Scheduled Meds:    acetylcysteine  600 mg Oral BID    amiodarone  200 mg Oral BID    Followed by    Navdeep Rivera ON 8/30/2022] amiodarone  200 mg Oral Daily    rivaroxaban  20 mg Oral Daily    clopidogrel  75 mg Oral Daily    amLODIPine  5 mg Oral Daily    atorvastatin  80 mg Oral Nightly    [Held by provider] clopidogrel  75 mg Oral Daily    insulin glargine  15 Units SubCUTAneous Nightly    metoprolol succinate  12.5 mg Oral Daily    sodium chloride flush  5-40 mL IntraVENous 2 times per day    insulin lispro  0-8 Units SubCUTAneous TID WC    insulin lispro  0-4 Units SubCUTAneous Nightly     Continuous Infusions:    sodium chloride 100 mL/hr at 08/25/22 0953    sodium chloride      dextrose       PRN Meds:  sodium chloride flush, sodium chloride flush, sodium chloride flush, sodium chloride, ondansetron **OR** ondansetron, polyethylene glycol, acetaminophen **OR** acetaminophen, glucose, dextrose bolus **OR** dextrose bolus, glucagon (rDNA), dextrose    Review of Systems:     Constitutional: No fever, no chills, no lethargy, no weakness.   HEENT:  No headache, otalgia, itchy eyes, nasal discharge or sore throat. Cardiac:  See HPI   chest:  No cough, phlegm or wheezing. Abdomen:  No abdominal pain, nausea or vomiting. Neuro:  No focal weakness, abnormal movements orseizure like activity. Skin:   No rashes, no itching. :   No hematuria, no pyuria, no dysuria, no flank pain. Extremities:  No swelling or joint pains. ROS was otherwise negative except as mentioned in the [address] Input/Output:       I/O last 3 completed shifts:  In: -   Out: 1250 [Urine:1250]  Patient Vitals for the past 96 hrs (Last 3 readings):   Weight   22 1831 180 lb (81.6 kg)   22 180 lb (81.6 kg)      Vital Signs:   Temperature:  Temp: 98.4 °F (36.9 °C)  TMax:   Temp (24hrs), Av.1 °F (36.7 °C), Min:97.9 °F (36.6 °C), Max:98.4 °F (36.9 °C)    Respirations:  Resp: 18  Pulse:   Heart Rate: 87  BP:    BP: (!) 140/77  BP Range: Systolic (11SNC), TQH:369 , Min:116 , AIO:454       Diastolic (43RVE), OPZ:15, Min:59, Max:77      Physical Examination:     General:  AAO x 3, speaking in full sentences, no accessory muscle use. HEENT: Atraumatic, normocephalic, no throat congestion, moist mucosa. Eyes:   Pupils equal, round and reactive to light, EOMI. Neck:   No JVD, no thyromegaly, no lymphadenopathy. Chest:  Bilateral vesicular breath sounds, no rales or wheezes. Cardiac:  S1 S2 RR, no murmurs, gallops or rubs, JVP not raised. Abdomen: Soft, non-tender, no masses or organomegaly, BS audible. :   No suprapubic or flank tenderness. Neuro:  AAO x 3, No FND. SKIN:  No rashes, good skin turgor. Extremities:  No edema, palpable peripheral pulses, no calf tenderness.     Labs:       Recent Labs     22  2040 22  0547 22  0318   WBC 6.7 6.4 6.7   RBC 3.51* 3.23* 3.33*   HGB 10.8* 10.0* 10.3*   HCT 33.2* 30.0* 31.6*   MCV 94.6 92.9 94.9   MCH 30.8 31.0 30.9   MCHC 32.5 33.3 32.6   RDW 12.5 12.3 12.4    167 194   MPV 11.0 10.8 10.9      BMP:   Recent Labs     22 08/22/22 2055 08/23/22 0236 08/23/22  0547 08/23/22  0744 08/25/22  0318     --   --  138  --  139   K 4.8  --   --  4.8  --  4.2     --   --  106  --  106   CO2 20  --   --  22  --  23   BUN 23  --   --  21  --  18   CREATININE 1.34* 1.36*  --  1.37*  --  1.43*   GLUCOSE 238*  --    < > 171* 130 91   CALCIUM 8.7  --   --  8.4*  --  8.5*    < > = values in this interval not displayed.       Phosphorus:     Recent Labs     08/25/22  0318   PHOS 3.4     Magnesium:    Recent Labs     08/23/22  0547 08/25/22 0318   MG 2.2 1.9     Albumin:    Recent Labs     08/23/22 0547 08/25/22 0318   LABALBU 3.7 3.5     BNP:    No results found for: BNP  SIA:    No results found for: SIA  SPEP:  Lab Results   Component Value Date/Time    PROT 6.2 08/23/2022 05:47 AM     UPEP:   No results found for: LABPE  C3:   No results found for: C3  C4:   No results found for: C4  MPO ANCA:   No results found for: MPO  PR3 ANCA:   No results found for: PR3  Anti-GBM:   No results found for: GBMABIGG  Hep BsAg:       No results found for: HEPBSAG  Hep C AB:        No results found for: HEPCAB    Urinalysis/Chemistries:      Lab Results   Component Value Date/Time    NITRU NEGATIVE 10/04/2015 07:55 PM    COLORU YELLOW 10/04/2015 07:55 PM    PHUR 5.0 10/04/2015 07:55 PM    WBCUA None 10/04/2015 07:55 PM    RBCUA None 10/04/2015 07:55 PM    MUCUS 1+ 10/04/2015 07:55 PM    TRICHOMONAS NOT REPORTED 10/04/2015 07:55 PM    YEAST NOT REPORTED 10/04/2015 07:55 PM    BACTERIA NOT REPORTED 10/04/2015 07:55 PM    SPECGRAV 1.019 10/04/2015 07:55 PM    LEUKOCYTESUR NEGATIVE 10/04/2015 07:55 PM    UROBILINOGEN Normal 10/04/2015 07:55 PM    BILIRUBINUR NEGATIVE 10/04/2015 07:55 PM    GLUCOSEU TRACE 10/04/2015 07:55 PM    KETUA NEGATIVE 10/04/2015 07:55 PM    AMORPHOUS 1+ 10/04/2015 07:55 PM     Urine Sodium:   No results found for: TOMMY  Urine Potassium:  No results found for: KUR  Urine Chloride:  No results found for: CLUR  Urine Osmolarity: No not hesitate to contact us for any further questions/concerns. We will continue to follow along with you.

## 2022-08-25 NOTE — DISCHARGE INSTRUCTIONS
Followup with cardiology and nephrology as instructed  See PCP 1 week   Continue to wear compression stocking and stay hydrated  Please have the spot on back evaluated by dermatology   If further palpitations come to ER    Take florinef which will hopefully help with orthostatic hypotension, tilt table was positive for this. Avoid rapid changes in position. Avoid dehydration. Speak with cardiology or primary care physician regarding this if symptoms do not go away.  Continue to use compression stockings

## 2022-08-26 ENCOUNTER — APPOINTMENT (OUTPATIENT)
Dept: CARDIAC CATH/INVASIVE PROCEDURES | Age: 87
DRG: 287 | End: 2022-08-26
Payer: MEDICARE

## 2022-08-26 LAB
ALBUMIN (CALCULATED): 3.6 G/DL (ref 3.2–5.2)
ALBUMIN PERCENT: 59 % (ref 45–65)
ALPHA 1 PERCENT: 4 % (ref 3–6)
ALPHA 2 PERCENT: 13 % (ref 6–13)
ALPHA-1-GLOBULIN: 0.2 G/DL (ref 0.1–0.4)
ALPHA-2-GLOBULIN: 0.8 G/DL (ref 0.5–0.9)
ANION GAP SERPL CALCULATED.3IONS-SCNC: 10 MMOL/L (ref 9–17)
ANION GAP SERPL CALCULATED.3IONS-SCNC: 8 MMOL/L (ref 9–17)
BETA GLOBULIN: 0.7 G/DL (ref 0.5–1.1)
BETA PERCENT: 11 % (ref 11–19)
BUN BLDV-MCNC: 18 MG/DL (ref 8–23)
BUN BLDV-MCNC: 18 MG/DL (ref 8–23)
CALCIUM SERPL-MCNC: 8.4 MG/DL (ref 8.6–10.4)
CALCIUM SERPL-MCNC: 8.7 MG/DL (ref 8.6–10.4)
CHLORIDE BLD-SCNC: 105 MMOL/L (ref 98–107)
CHLORIDE BLD-SCNC: 106 MMOL/L (ref 98–107)
CO2: 22 MMOL/L (ref 20–31)
CO2: 23 MMOL/L (ref 20–31)
CREAT SERPL-MCNC: 1.29 MG/DL (ref 0.7–1.2)
CREAT SERPL-MCNC: 1.54 MG/DL (ref 0.7–1.2)
GAMMA GLOBULIN %: 15 % (ref 9–20)
GAMMA GLOBULIN: 0.9 G/DL (ref 0.5–1.5)
GFR AFRICAN AMERICAN: 52 ML/MIN
GFR AFRICAN AMERICAN: >60 ML/MIN
GFR NON-AFRICAN AMERICAN: 43 ML/MIN
GFR NON-AFRICAN AMERICAN: 53 ML/MIN
GFR SERPL CREATININE-BSD FRML MDRD: ABNORMAL ML/MIN/{1.73_M2}
GFR SERPL CREATININE-BSD FRML MDRD: ABNORMAL ML/MIN/{1.73_M2}
GLUCOSE BLD-MCNC: 173 MG/DL (ref 70–99)
GLUCOSE BLD-MCNC: 191 MG/DL (ref 75–110)
GLUCOSE BLD-MCNC: 252 MG/DL (ref 75–110)
GLUCOSE BLD-MCNC: 77 MG/DL (ref 70–99)
GLUCOSE BLD-MCNC: 84 MG/DL (ref 75–110)
GLUCOSE BLD-MCNC: 94 MG/DL (ref 75–110)
HCT VFR BLD CALC: 33.5 % (ref 40.7–50.3)
HEMOGLOBIN: 10.8 G/DL (ref 13–17)
MAGNESIUM: 1.8 MG/DL (ref 1.6–2.6)
MCH RBC QN AUTO: 30.3 PG (ref 25.2–33.5)
MCHC RBC AUTO-ENTMCNC: 32.2 G/DL (ref 28.4–34.8)
MCV RBC AUTO: 94.1 FL (ref 82.6–102.9)
NRBC AUTOMATED: 0 PER 100 WBC
PATHOLOGIST: ABNORMAL
PATHOLOGIST: NORMAL
PDW BLD-RTO: 12.3 % (ref 11.8–14.4)
PLATELET # BLD: 185 K/UL (ref 138–453)
PMV BLD AUTO: 10.6 FL (ref 8.1–13.5)
POTASSIUM SERPL-SCNC: 3.8 MMOL/L (ref 3.7–5.3)
POTASSIUM SERPL-SCNC: 4.4 MMOL/L (ref 3.7–5.3)
PROTEIN ELECTROPHORESIS, SERUM: ABNORMAL
RBC # BLD: 3.56 M/UL (ref 4.21–5.77)
SERUM IFX INTERP: NORMAL
SODIUM BLD-SCNC: 136 MMOL/L (ref 135–144)
SODIUM BLD-SCNC: 138 MMOL/L (ref 135–144)
TOTAL PROT. SUM,%: 102 % (ref 98–102)
TOTAL PROT. SUM: 6.2 G/DL (ref 6.3–8.2)
TOTAL PROTEIN: 6.2 G/DL (ref 6.4–8.3)
WBC # BLD: 7.6 K/UL (ref 3.5–11.3)

## 2022-08-26 PROCEDURE — 6370000000 HC RX 637 (ALT 250 FOR IP): Performed by: STUDENT IN AN ORGANIZED HEALTH CARE EDUCATION/TRAINING PROGRAM

## 2022-08-26 PROCEDURE — 6360000002 HC RX W HCPCS: Performed by: STUDENT IN AN ORGANIZED HEALTH CARE EDUCATION/TRAINING PROGRAM

## 2022-08-26 PROCEDURE — 6370000000 HC RX 637 (ALT 250 FOR IP): Performed by: INTERNAL MEDICINE

## 2022-08-26 PROCEDURE — 80048 BASIC METABOLIC PNL TOTAL CA: CPT

## 2022-08-26 PROCEDURE — 36415 COLL VENOUS BLD VENIPUNCTURE: CPT

## 2022-08-26 PROCEDURE — 6360000004 HC RX CONTRAST MEDICATION

## 2022-08-26 PROCEDURE — 99232 SBSQ HOSP IP/OBS MODERATE 35: CPT | Performed by: INTERNAL MEDICINE

## 2022-08-26 PROCEDURE — 82947 ASSAY GLUCOSE BLOOD QUANT: CPT

## 2022-08-26 PROCEDURE — 83735 ASSAY OF MAGNESIUM: CPT

## 2022-08-26 PROCEDURE — 93458 L HRT ARTERY/VENTRICLE ANGIO: CPT

## 2022-08-26 PROCEDURE — B2111ZZ FLUOROSCOPY OF MULTIPLE CORONARY ARTERIES USING LOW OSMOLAR CONTRAST: ICD-10-PCS | Performed by: INTERNAL MEDICINE

## 2022-08-26 PROCEDURE — 2060000000 HC ICU INTERMEDIATE R&B

## 2022-08-26 PROCEDURE — 2500000003 HC RX 250 WO HCPCS

## 2022-08-26 PROCEDURE — 6360000002 HC RX W HCPCS

## 2022-08-26 PROCEDURE — 4A023N7 MEASUREMENT OF CARDIAC SAMPLING AND PRESSURE, LEFT HEART, PERCUTANEOUS APPROACH: ICD-10-PCS | Performed by: INTERNAL MEDICINE

## 2022-08-26 PROCEDURE — 2709999900 HC NON-CHARGEABLE SUPPLY

## 2022-08-26 PROCEDURE — C1894 INTRO/SHEATH, NON-LASER: HCPCS

## 2022-08-26 PROCEDURE — 6360000002 HC RX W HCPCS: Performed by: INTERNAL MEDICINE

## 2022-08-26 PROCEDURE — 85027 COMPLETE CBC AUTOMATED: CPT

## 2022-08-26 PROCEDURE — 94761 N-INVAS EAR/PLS OXIMETRY MLT: CPT

## 2022-08-26 RX ORDER — SODIUM CHLORIDE 0.9 % (FLUSH) 0.9 %
5-40 SYRINGE (ML) INJECTION EVERY 12 HOURS SCHEDULED
Status: DISCONTINUED | OUTPATIENT
Start: 2022-08-26 | End: 2022-08-29 | Stop reason: HOSPADM

## 2022-08-26 RX ORDER — ACETAMINOPHEN 325 MG/1
650 TABLET ORAL EVERY 4 HOURS PRN
Status: DISCONTINUED | OUTPATIENT
Start: 2022-08-26 | End: 2022-08-29 | Stop reason: HOSPADM

## 2022-08-26 RX ORDER — RANOLAZINE 500 MG/1
500 TABLET, EXTENDED RELEASE ORAL 2 TIMES DAILY
Qty: 60 TABLET | Refills: 3 | Status: SHIPPED | OUTPATIENT
Start: 2022-08-26 | End: 2022-08-28 | Stop reason: HOSPADM

## 2022-08-26 RX ORDER — RANOLAZINE 500 MG/1
500 TABLET, EXTENDED RELEASE ORAL 2 TIMES DAILY
Status: DISCONTINUED | OUTPATIENT
Start: 2022-08-26 | End: 2022-08-29 | Stop reason: HOSPADM

## 2022-08-26 RX ORDER — SODIUM CHLORIDE 9 MG/ML
INJECTION, SOLUTION INTRAVENOUS PRN
Status: DISCONTINUED | OUTPATIENT
Start: 2022-08-26 | End: 2022-08-29 | Stop reason: HOSPADM

## 2022-08-26 RX ORDER — AMIODARONE HYDROCHLORIDE 200 MG/1
TABLET ORAL
Qty: 44 TABLET | Refills: 0 | Status: SHIPPED | OUTPATIENT
Start: 2022-08-26 | End: 2022-08-28 | Stop reason: HOSPADM

## 2022-08-26 RX ORDER — SODIUM CHLORIDE 0.9 % (FLUSH) 0.9 %
5-40 SYRINGE (ML) INJECTION PRN
Status: DISCONTINUED | OUTPATIENT
Start: 2022-08-26 | End: 2022-08-29 | Stop reason: HOSPADM

## 2022-08-26 RX ADMIN — Medication 600 MG: at 07:55

## 2022-08-26 RX ADMIN — Medication 600 MG: at 21:51

## 2022-08-26 RX ADMIN — METOPROLOL SUCCINATE 12.5 MG: 25 TABLET, FILM COATED, EXTENDED RELEASE ORAL at 12:51

## 2022-08-26 RX ADMIN — INSULIN GLARGINE 15 UNITS: 100 INJECTION, SOLUTION SUBCUTANEOUS at 21:00

## 2022-08-26 RX ADMIN — RANOLAZINE 500 MG: 500 TABLET, FILM COATED, EXTENDED RELEASE ORAL at 13:43

## 2022-08-26 RX ADMIN — AMIODARONE HYDROCHLORIDE 200 MG: 200 TABLET ORAL at 20:51

## 2022-08-26 RX ADMIN — AMIODARONE HYDROCHLORIDE 200 MG: 200 TABLET ORAL at 12:51

## 2022-08-26 RX ADMIN — AMLODIPINE BESYLATE 5 MG: 5 TABLET ORAL at 12:51

## 2022-08-26 RX ADMIN — RIVAROXABAN 20 MG: 20 TABLET, FILM COATED ORAL at 19:13

## 2022-08-26 RX ADMIN — ONDANSETRON 4 MG: 2 INJECTION INTRAMUSCULAR; INTRAVENOUS at 17:08

## 2022-08-26 RX ADMIN — ATORVASTATIN CALCIUM 80 MG: 80 TABLET, FILM COATED ORAL at 20:51

## 2022-08-26 ASSESSMENT — PAIN SCALES - GENERAL
PAINLEVEL_OUTOF10: 0
PAINLEVEL_OUTOF10: 0

## 2022-08-26 NOTE — CARE COORDINATION
Met with patient to discuss transitional planning. He is returning home independently.  He denies needs and has transportation    Discharge [facility] Case Management Department  Written by: Sofi Spears RN    Patient Name: Lashay Riggs  Attending Provider: Hien Quijano DO  Admit Date: 2022  8:22 PM  MRN: [id number]  Account: [de-identified]                     : 1935  Discharge Date: 2022      Disposition: home    Sofi Spears RN

## 2022-08-26 NOTE — OP NOTE
[facility]  Cardiac catheterization note        Date:   8/26/2022  Patient name: Tristan Dominguez  Date of admission:  8/22/2022  8:22 PM  MRN:   [id number]  YOB: 1935    Operators:  Izzy Stewart MD                    Pre Procedure Conscious Sedation Data:    ASA Class:    [] I [x] II [] III [] IV    Mallampati Class:  [] I [x] II [] III [] IV      Indications:    - Atrial fibrillation       - Elevated troponin       - Prior PCI with stent placement     Procedure:   Left heart catheterization   Selective left and right coronary angiography   LVEDP      Access:  [] Femoral  [x] Radial  artery       [x] Right  [] Left    Procedure: After informed consent was obtained with explanation of the risks and benefits, patient was brought to the cath lab. The access area was prepped and draped in sterile fashion. 1% lidocaine was used for local block. The artery was cannulated with 6  Fr sheath with brisk arterial blood return. The side port was frequently flushed and aspirated with normal saline. Cardiac Arteries and Lesion Findings       LMCA: Mild irregularities 10-20%. LAD: Mid diffuse 50% stenosis same as on last angiogram.   D1 is intermediate with mid diffuse 60% stenosis   D2 is small and patent     LCx: Mild irregularities 20-30%. OM1 is small and patent   OM2 is small with ostial 100% occlusion and left to left collaterals     RCA: Proximal 100% chronic in-stent restenosis with good left to right   collaterals      Coronary Tree        Dominance: Right     Procedure Summary        1. Chronic total occlusion of RCA with left to right Collaterals    2. Moderate Nonobstructive disease in LAD/D1 same as on last angiogram    3. Normal LVEDP. LV gram not done due to renal insufficiency        Recommendations        Routine Post Diagnostic Cath orders. Optimize medical therapy for CAD    Resume anticoagulation    Risk factor modification.      Estimated Blood Loss: [x] Minimal 10 cc   [] Minimal < 25 cc      [] Moderate 25-50 cc         [] >50 cc      Electronically signed by Yoanna Landry MD on 8/26/2022 at 10:17 AM  Cardiovascular fellow  [id number] Joy Hatch MD, Susana Saini

## 2022-08-26 NOTE — PROGRESS NOTES
[facility]  Office: [address], DO, Nery Kirk, DO, Odell Guerin, DO, Bill Smita Porter, DO, Laila Montgomery MD, Austin Velarde MD, Ramana Sanders MD, Rohith Weiss MD,  Ale Wade MD, Elaine Rodriguez MD, Neha Rubin, DO, Avelino Campuzano MD,  Guillermo Davidson MD, Tarun Diana MD, Chaya Farris DO, Sarah Mansfield MD, Alma Delia Rowley MD, Brenda Brandon MD, Florence Burris DO, Marge Lizama MD, Kiara Brewer MD, Franca Rothman, CNP,  Sandrita Anand, CNP, David Crouch, CNP, Reid Hartley, CNP, Sofiya Wei PAElvaC, Alma Cartagena, DNP, Andrea Gil, CNP, Ian Baron, CNP, Lynnette Sun, CNP, Sary Dangelo, CNP, Rafi Albarran, CNP, Jose Nurse, CNS, Annie Garcias, [facility], Adela Diaz, CNP, Areli Gandara, CNP, Bipin Hubbard, [address] [place] [address]    Progress Note    8/26/2022    10:43 AM    Name:   Vikram Roth  MRN:     [id number]     [place]:      [de-identified]   Room:   2001/2001-01  IP Day:  4  Admit Date:  8/22/2022  8:22 PM    PCP:   Eran Matamoros MD  Code Status:  Full Code    Subjective:     C/C:   Chief Complaint   Patient presents with    Chest Pain     Interval History Status: not changed. Patient seen and examined, underwent heart cath today with no new stents placed. No chest pain afterwards, discussed after bandage removal patient can ambulate, if no further issues with dizziness can possibly discharge this afternoon. Brief History:     From H&P  Vikram Roth is a 80 y.o.  male with history of coronary disease status post prior MI in 2018, prior stroke, type 2 diabetes, CKD 3 who presented with acute symptomatic tachycardia with chest pain with Chest Pain   and is admitted to the hospital for the management of Atrial fibrillation with RVR ([facility] [address] Patient with prior episode of chest pain 2 to 3 weeks ago status post evaluation at [facility].   Was seen in follow-up with [facility] cardiology earlier in August.  Recommended for stress test which was scheduled for September. Patient describes abrupt onset of chest pain earlier this evening after standing up in the kitchen after eating. Symptoms persisted for 2-1/2 hours constant. Currently resolved since arrival to ED status post heparin/amio drip. Pain was initially Sharp aching quality midsternum with radiation to the left shoulder. Denied any prior episode of chest pain shortness of breath when he was doing yard work earlier that morning. Was out in the yard for approximately 30 minutes this morning. Did denies any heavy lifting. With chest pain episode patient did become acutely diaphoretic with nausea with mild pleurisy. + Tachycardia.  + Lightheadedness but denies near syncope or collapse. Denies prior DVT PE or recent travel. Status post heparin/amio drip in ED. In ED patient found to have heart rates in the 130s to 150s with A. fib status post loading with amiodarone drip. Chest pain has resolved since controlling tachycardia. Denies any prior issues with tachycardia, palpitations. Denies fevers or chills or flulike symptoms. Denies positional changes. Review of Systems:     Constitutional:  negative for chills, fevers, sweats  Respiratory:  negative for cough, dyspnea on exertion, shortness of breath, wheezing  Cardiovascular:  negative for chest pain, chest pressure/discomfort, lower extremity edema, palpitations  Gastrointestinal:  negative for abdominal pain, constipation, diarrhea, nausea, vomiting  Neurological:  negative for dizziness, headache    Medications: Allergies:     Allergies   Allergen Reactions    Sulfa Antibiotics Anaphylaxis       Current Meds:   Scheduled Meds:    sodium chloride flush  5-40 mL IntraVENous 2 times per day    ranolazine  500 mg Oral BID    acetylcysteine  600 mg Oral BID    amiodarone  200 mg Oral BID    Followed by    Floy Jaci ON 8/30/2022] amiodarone  200 mg Oral Daily    rivaroxaban  20 mg Oral Daily    clopidogrel  75 mg Oral Daily    amLODIPine  5 mg Oral Daily    atorvastatin  80 mg Oral Nightly    insulin glargine  15 Units SubCUTAneous Nightly    metoprolol succinate  12.5 mg Oral Daily    sodium chloride flush  5-40 mL IntraVENous 2 times per day    insulin lispro  0-8 Units SubCUTAneous TID WC    insulin lispro  0-4 Units SubCUTAneous Nightly     Continuous Infusions:    sodium chloride      sodium chloride 100 mL/hr at 08/26/22 5724    sodium chloride      dextrose       PRN Meds: sodium chloride flush, sodium chloride, acetaminophen, sodium chloride flush, sodium chloride flush, sodium chloride flush, sodium chloride, ondansetron **OR** ondansetron, polyethylene glycol, acetaminophen **OR** acetaminophen, glucose, dextrose bolus **OR** dextrose bolus, glucagon (rDNA), dextrose    Data:     Past Medical History:   has a past medical history of Acute myocardial ischemia ([facility] [address] Acute sinusitis, Cerebral artery occlusion with cerebral infarction [facility]), Chest pain, Diabetes mellitus ([facility] [address] H/O cataract, Hyperlipidemia, Hypertension, MI (myocardial infarction) ([facility] [address] Neck stiffness, [facility] Peyronie's disease, Rotator cuff tendonitis, and TIA (transient ischemic attack). Social History:   reports that he has quit smoking. He has a 4.00 pack-year smoking history. He has never used smokeless tobacco. He reports that he does not drink alcohol and does not use drugs. Family History:   Family History   Problem Relation Age of Onset    Cancer Mother         oral    Stroke Father     High Blood Pressure Brother     Heart Disease Brother     Cancer Maternal Grandmother     Diabetes type 2  Son     Heart Attack Son     Heart Disease Maternal Grandfather     Stroke Paternal Grandmother     Heart Disease Paternal Grandfather         ?        Vitals:  /63   Pulse 57   Temp 97.7 °F (36.5 °C) (Oral)   Resp 16   Ht 5' 10\" (1.778 m)   Wt 180 lb (81.6 kg)   SpO2 99%   BMI 25.83 kg/m²   Temp (24hrs), Av.4 °F (36.9 °C), Min:97.7 °F (36.5 °C), Max:99.1 °F (37.3 °C)    Recent Labs     22  10522  0634   POCGLU 131* 160* 208* 84         I/O (24Hr): Intake/Output Summary (Last 24 hours) at 2022 1043  Last data filed at 2022 0802  Gross per 24 hour   Intake --   Output 1500 ml   Net -1500 ml         Labs:  Hematology:  Recent Labs     22  0710   WBC 6.7 7.6   RBC 3.33* 3.56*   HGB 10.3* 10.8*   HCT 31.6* 33.5*   MCV 94.9 94.1   MCH 30.9 30.3   MCHC 32.6 32.2   RDW 12.4 12.3    185   MPV 10.9 10.6       Chemistry:  Recent Labs     22  0710    138   K 4.2 3.8    105   CO2 23 23   GLUCOSE 91 77   BUN 18 18   CREATININE 1.43* 1.54*   MG 1.9 1.8   ANIONGAP 10 10   LABGLOM 47* 43*   GFRAA 57* 52*   CALCIUM 8.5* 8.7   PHOS 3.4  --        Recent Labs     2223 22  10522  1648 22  0634   PROT  --   --   --   --  6.2*  --   --   --    LABALBU  --  3.5  --   --   --   --   --   --    POCGLU 236*  --  106 131*  --  160* 208* 84       ABG:No results found for: POCPH, PHART, PH, POCPCO2, LSJ9WNF, PCO2, POCPO2, PO2ART, PO2, POCHCO3, JHS9PAU, HCO3, NBEA, PBEA, BEART, BE, THGBART, THB, DGU9CJU, YUCQ8AMY, L4CDTYQZ, O2SAT, FIO2  No results found for: SPECIAL  No results found for: CULTURE    Radiology:  XR CHEST PORTABLE    Result Date: 2022  Clear lungs.        Physical Examination:        General appearance:  alert, cooperative and no distress  Mental Status:  oriented to person, place and time and normal affect  Lungs:  clear to auscultation bilaterally, normal effort  Heart: Irregularly irregular rate rhythm no murmurs rubs gallops  Abdomen:  soft, nontender, nondistended, normal bowel sounds, no masses, hepatomegaly, splenomegaly  Extremities:  no edema, redness, tenderness in the calves  Skin:  no gross lesions, rashes, induration    Assessment:        Hospital Problems             Last Modified POA    * (Principal) Atrial fibrillation with RVR ([facility]) 8/23/2022 Yes    Lactic acid acidosis 8/23/2022 Yes    CKD (chronic kidney disease) stage 3, GFR 30-59 ml/min ([facility]) 8/23/2022 Yes    Controlled type 2 diabetes with neuropathy ([address] 8/23/2022 Yes    CAD (coronary artery disease) 8/23/2022 Yes    Demand ischemia ([address] 8/23/2022 Yes    Unstable angina ([address] 8/23/2022 Yes    Anemia 8/23/2022 Yes     Plan:        Atrial fibrillation with RVR-patient was on amiodarone and Toprol-XL, bradycardic this morning and after shower became very dizzy. Blood pressure in the 150s and otherwise no obvious etiology for dizziness besides bradycardia. Discussed with cardiology, they will come to reevaluate. Apparently sent for stress testing later this morning, awaiting results.   CAD  Type 2 diabetes  Plan:  No intervention on cardiac catheterization, restart anticoagulation   Possible discharge this afternoon if able to ambulate without any issues      Stephanie Quintana DO  8/26/2022  10:43 AM

## 2022-08-26 NOTE — PROGRESS NOTES
Renal Progress Note    Patient :  Nicole Mathew; 80 y.o. MRN# [id number]  Location:  2001/2001-01  Attending:  Frank Simmonds, DO  Admit Date:  8/22/2022   Hospital Day: 4      Subjective:     Cardiac catheterization done today, found to have 100% proximal RCA occlusion with chronic in-stent stenosis with left-to-right collaterals. Mild diffuse disease in the LAD and 20 to 30% lesion in the left circumflex. OM 2 had ostial occlusion with good collaterals. No intervention done. Patient is making good urine. Creatinine was 1.5 this morning. Work-up so far shows right kidney 10 left 9.1 cm without any echogenicity. UPC less than 0.1. Serological work-up negative so far although immunofixation pending kappa lambda free light chain ratio slightly high at 2. Patient denies any shortness of breath, chest pain, PND, orthopnea. No cough phlegm or hemoptysis. No fever or chills. No more chest pains. Labs 8/26/2022: Sodium 138 potassium 3.8 creatinine 1.5 calcium 8.7 bicarb 23    Outpatient Medications:     Medications Prior to Admission: magnesium oxide (MAG-OX) 400 MG tablet, Take 1 tablet by mouth in the morning.   ULTICARE SHORT PEN NEEDLES 31G X 8 MM MISC, INJECT 1 NEEDLE INTO THE SKIN DAILY  Accu-Chek FastClix Lancets MISC, USE ONE LANCET TO TEST TWICE A DAY AND AS NEEDED  blood glucose test strips (FREESTYLE TEST STRIPS) strip, USE ONE STRIP TO TEST TWICE A DAY AND AS NEEDED  atorvastatin (LIPITOR) 80 MG tablet, TAKE 1 TABLET NIGHTLY  clopidogrel (PLAVIX) 75 MG tablet, TAKE 1 TABLET DAILY  metoprolol succinate (TOPROL XL) 25 MG extended release tablet, Take 0.5 tablets by mouth daily  LANTUS SOLOSTAR 100 UNIT/ML injection pen, INJECT 15 UNITS INTO THE SKIN NIGHTLY  XARELTO 20 MG TABS tablet, TAKE 1 TABLET DAILY  metFORMIN (GLUCOPHAGE-XR) 500 MG extended release tablet, Take 3 tablets by mouth daily (with breakfast)  amLODIPine (NORVASC) 5 MG tablet, TAKE 1 TABLET DAILY  lisinopril-hydroCHLOROthiazide (PRINZIDE;ZESTORETIC) 20-12.5 MG per tablet, TAKE 1 TABLET DAILY  Blood Glucose Monitoring Suppl (FREESTYLE FREEDOM LITE) w/Device KIT, 1 kit by Does not apply route daily as needed (check sugars bid and prn)  acetaminophen (TYLENOL) 500 MG tablet, Take 500 mg by mouth every 6 hours as needed for Pain  Lancet Device MISC, 1 Device by Does not apply route once for 1 dose  nitroGLYCERIN (NITROSTAT) 0.4 MG SL tablet, Place 1 tablet under the tongue every 5 minutes as needed for Chest pain  Coenzyme Q10 (CO Q-10) 30 MG CAPS, Take 30 mg by mouth daily  Ascorbic Acid (VITAMIN C) 250 MG tablet, Take 500 mg by mouth daily  ferrous sulfate 325 (65 FE) MG tablet, Take 325 mg by mouth daily     Current Medications:     Scheduled Meds:    sodium chloride flush  5-40 mL IntraVENous 2 times per day    ranolazine  500 mg Oral BID    acetylcysteine  600 mg Oral BID    amiodarone  200 mg Oral BID    Followed by    Radha Allred ON 8/30/2022] amiodarone  200 mg Oral Daily    rivaroxaban  20 mg Oral Daily    clopidogrel  75 mg Oral Daily    amLODIPine  5 mg Oral Daily    atorvastatin  80 mg Oral Nightly    insulin glargine  15 Units SubCUTAneous Nightly    metoprolol succinate  12.5 mg Oral Daily    sodium chloride flush  5-40 mL IntraVENous 2 times per day    insulin lispro  0-8 Units SubCUTAneous TID WC    insulin lispro  0-4 Units SubCUTAneous Nightly     Continuous Infusions:    sodium chloride      sodium chloride 100 mL/hr at 08/26/22 0655    sodium chloride      dextrose       PRN Meds:  sodium chloride flush, sodium chloride, acetaminophen, sodium chloride flush, sodium chloride flush, sodium chloride flush, sodium chloride, ondansetron **OR** ondansetron, polyethylene glycol, acetaminophen **OR** acetaminophen, glucose, dextrose bolus **OR** dextrose bolus, glucagon (rDNA), dextrose    Input/Output:       I/O last 3 completed shifts:  In: -   Out: 2550 [Urine:2550].     Patient Vitals for the past 96 hrs (Last 3 readings):   Weight 22 1831 180 lb (81.6 kg)   22 204 180 lb (81.6 kg)       Vital Signs:   Temperature:  Temp: 97.7 °F (36.5 °C)  TMax:   Temp (24hrs), Av.3 °F (36.8 °C), Min:97.7 °F (36.5 °C), Max:99.1 °F (37.3 °C)    Respirations:  Resp: 14  Pulse:   Heart Rate: 58  BP:    BP: (!) 149/63  BP Range: Systolic (06CPM), IPC:715 , Min:111 , GVZ:800       Diastolic (69LBY), UCY:01, Min:54, Max:69      Physical Examination:     General:  AAO x 3, speaking in full sentences, no accessory muscle use. HEENT: Atraumatic, normocephalic, no throat congestion, moist mucosa. Eyes:   Pupils equal, round and reactive to light, EOMI. Neck:   No JVD, no thyromegaly, no lymphadenopathy. Chest:  Bilateral vesicular breath sounds, no rales or wheezes. Cardiac:  S1 S2 RR, no murmurs, gallops or rubs, JVP not raised. Abdomen: Soft, non-tender, no masses or organomegaly, BS audible. :   No suprapubic or flank tenderness. Neuro:  AAO x 3, No FND. SKIN:  No rashes, good skin turgor. Extremities:  No edema, palpable peripheral pulses, no calf tenderness.     Labs:       Recent Labs     228 22  0710   WBC 6.7 7.6   RBC 3.33* 3.56*   HGB 10.3* 10.8*   HCT 31.6* 33.5*   MCV 94.9 94.1   MCH 30.9 30.3   MCHC 32.6 32.2   RDW 12.4 12.3    185   MPV 10.9 10.6      BMP:   Recent Labs     22  0318 22  0710    138   K 4.2 3.8    105   CO2 23 23   BUN 18 18   CREATININE 1.43* 1.54*   GLUCOSE 91 77   CALCIUM 8.5* 8.7      Phosphorus:     Recent Labs     22   PHOS 3.4     Magnesium:    Recent Labs     228 22  0710   MG 1.9 1.8     Albumin:    Recent Labs     22  0318   LABALBU 3.5     BNP:    No results found for: BNP  SIA:    No results found for: SIA  SPEP:  Lab Results   Component Value Date/Time    PROT 6.2 2022 04:48 PM    ALBCAL PENDING 2022 04:48 PM    ALBPCT PENDING 2022 04:48 PM    LABALPH PENDING 2022 04:48 PM    LABALPH PENDING 08/25/2022 04:48 PM    A1PCT PENDING 08/25/2022 04:48 PM    A2PCT PENDING 08/25/2022 04:48 PM    LABBETA PENDING 08/25/2022 04:48 PM    BETAPCT PENDING 08/25/2022 04:48 PM    GAMGLOB PENDING 08/25/2022 04:48 PM    GGPCT PENDING 08/25/2022 04:48 PM    PATH PENDING 08/25/2022 04:48 PM     UPEP:   No results found for: LABPE  C3:     Lab Results   Component Value Date/Time    C3 122 08/25/2022 04:48 PM     C4:     Lab Results   Component Value Date/Time    C4 29 08/25/2022 04:48 PM     MPO ANCA:   No results found for: MPO  PR3 ANCA:   No results found for: PR3  Anti-GBM:   No results found for: GBMABIGG  Hep BsAg:       No results found for: HEPBSAG  Hep C AB:        No results found for: HEPCAB    Urinalysis/Chemistries:      Lab Results   Component Value Date/Time    NITRU NEGATIVE 10/04/2015 07:55 PM    COLORU YELLOW 10/04/2015 07:55 PM    PHUR 5.0 10/04/2015 07:55 PM    WBCUA None 10/04/2015 07:55 PM    RBCUA None 10/04/2015 07:55 PM    MUCUS 1+ 10/04/2015 07:55 PM    TRICHOMONAS NOT REPORTED 10/04/2015 07:55 PM    YEAST NOT REPORTED 10/04/2015 07:55 PM    BACTERIA NOT REPORTED 10/04/2015 07:55 PM    SPECGRAV 1.019 10/04/2015 07:55 PM    LEUKOCYTESUR NEGATIVE 10/04/2015 07:55 PM    UROBILINOGEN Normal 10/04/2015 07:55 PM    BILIRUBINUR NEGATIVE 10/04/2015 07:55 PM    GLUCOSEU TRACE 10/04/2015 07:55 PM    KETUA NEGATIVE 10/04/2015 07:55 PM    AMORPHOUS 1+ 10/04/2015 07:55 PM     Urine Sodium:   No results found for: TOMMY  Urine Potassium:  No results found for: KUR  Urine Chloride:  No results found for: CLUR  Urine Osmolarity: No results found for: OSMOU  Urine Protein:   No components found for: TOTALPROTEIN, URINE   Urine Creatinine:     Lab Results   Component Value Date/Time    LABCREA 44.8 08/25/2022 01:32 PM     Urine Eosinophils:  No components found for: UEOS    Radiology:     CXR:     Assessment:     1.   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 Acute non-ST relation MI presenting as chest pain with positive stress test, cardiac catheterization done no intervention needed significant collaterals noted  3. 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 Essential hypertension  5. Paroxysmal atrial fibrillation on anticoagulation    Plan:   1. Continue IV fluids for 6 hours postcardiac catheterization normal saline at 100 mL an hour  2. Repeat BMP at about 3:00 and if renal function continues to be stable patient can be discharged home today  3. BMP to be repeated outpatient weekly x4  4. Outpatient follow-up with nephrology in 2 to 3 weeks  5. Keep off metformin and lisinopril on discharge which can be started when he is seen in the office as outpatient. 6.  We will follow with you    Nutrition   Please ensure that patient is on a renal diet/TF. Avoid nephrotoxic drugs/contrast exposure. We will continue to follow along with you.

## 2022-08-26 NOTE — FLOWSHEET NOTE
Compression hose applied, IVF at 125 and nausea resolved after Zofran, PT< OT and  plan to hold ranexa ordered

## 2022-08-26 NOTE — DISCHARGE INSTR - COC
Continuity of Care Form    Patient Name: Shade Shelton   :  1935  MRN:  [id number]    Admit date:  2022  Discharge date:  ***    Code Status Order: Full Code   Advance Directives:     Admitting Physician:  No admitting provider for patient encounter.   PCP: Parvin Cespedes MD    Discharging Nurse: [facility] Unit/Room#:   Discharging Unit Phone Number: ***    Emergency Contact:   Extended Emergency Contact Information  Primary Emergency Contact: Tessa Childers  Address: [address] [place] [address]  Home Phone: [phone]  Relation: Spouse  Secondary Emergency Contact: Winnie Sumner  [address] [facility]  Mobile Phone: [phone]  Relation: Child    Past Surgical History:  Past Surgical History:   Procedure Laterality Date    CARDIAC CATHETERIZATION      mulitple caths- 5 stents total    CARDIAC SURGERY  2018    3 stents    CATARACT REMOVAL WITH IMPLANT Left     SKIN BIOPSY      forehead       Immunization History:   Immunization History   Administered Date(s) Administered     Influenza, FLUZONE (age 72 y+), High Dose 2011, 2012, 2013, 2014, 2015    COVID-19, PFIZER GRAY top, DO NOT Dilute, (age 15 y+), IM, 30 mcg/0.3 mL 2022    COVID-19, PFIZER PURPLE top, DILUTE for use, (age 15 y+), 30mcg/0.3mL 2021, 2021, 2021    Influenza Virus Vaccine 10/06/2009, 2010, 2013, 2014, 2015, 11/10/2019    Influenza, FLUAD, (age 72 y+), Adjuvanted 10/23/2020, 10/18/2021    Influenza, FLUARIX, FLULAVAL, (age 10 mo+) AND AFLURIA, FLUZONE (age 1 y+), PF 11/10/2019    Influenza, High Dose (Fluzone 65 yrs and older) 2016, 10/09/2017, 2018    Pneumococcal Conjugate 13-valent (Xooicdw44) 2016    Pneumococcal Polysaccharide (Igiexfced72) 2006, 10/06/2015       Active Problems:  Patient Active Problem List   Diagnosis Code    Controlled type 2 diabetes with neuropathy ([facility]) E11.40    Essential hypertension I10    CAD (coronary artery disease) I25.10    Bradycardia R00.1    Demand ischemia ([facility]) I24.8    Unstable angina ([facility]) I20.0    Anxiety F41.9    Chronic rhinitis J31.0    Diabetic retinopathy ([facility]) E11.319    Dizziness R42    Hyperlipidemia E78.5    Insomnia G47.00    Lumbar spondylolysis M43.06    Stroke syndrome FMU1823    AK (actinic keratosis) L57.0    Chronic sinusitis J32.9    Anemia D64.9    Arthritis M19.90    Paroxysmal atrial fibrillation ([facility]) I48.0    S/P skin biopsy Z98.890    Squamous cell carcinoma in situ D09.9    Atrial fibrillation with RVR ([facility]) I48.91    Lactic acid acidosis E87.2    CKD (chronic kidney disease) stage 3, GFR 30-59 ml/min ([facility]) N18.30       Isolation/Infection:   Isolation            No Isolation          Patient Infection Status       None to display            Nurse Assessment:  Last Vital Signs: BP (!) 113/50   Pulse 52   Temp 97.7 °F (36.5 °C) (Oral)   Resp 15   Ht 5' 10\" (1.778 m)   Wt 180 lb (81.6 kg)   SpO2 96%   BMI 25.83 kg/m²     Last documented pain score (0-10 scale): Pain Level: 0  Last Weight:   Wt Readings from Last 1 Encounters:   08/23/22 180 lb (81.6 kg)     Mental Status:  {IP PT MENTAL STATUS:20030}    IV Access:  { RENÉ IV ACCESS:467560022}    Nursing Mobility/ADLs:  Walking   {P DME YWDR:786333763}  Transfer  {P DME RASQ:797608035}  Bathing  {P DME ENEH:948006712}  Dressing  {CHP DME UAHX:129765782}  Toileting  {P DME HKYY:490581464}  Feeding  {P DME XLTY:301216845}  Med Admin  {P DME OTWA:574394556}  Med Delivery   { RENÉ MED Delivery:612764473}    Wound Care Documentation and Therapy:  Puncture 01/26/18 Groin Right (Active)   Number of days: 5077        Elimination:  Continence:    Bowel: {YES / ED:57975}  Bladder: {YES / FB:78846}  Urinary Catheter: {Urinary Catheter:703238535}   Colostomy/Ileostomy/Ileal Conduit: {YES / PO:19661}       Date of Last BM: ***    Intake/Output Summary (Last 24 hours) at 2022 1512  Last data filed at 2022 1300  Gross per 24 hour   Intake 600 ml   Output 1800 ml   Net -1200 ml     I/O last 3 completed shifts:  In: -   Out: 2550 [Urine:2550]    Safety Concerns:     [address] Safety Concerns:857468249}    Impairments/Disabilities:      [address] Impairments/Disabilities:474983966}    Nutrition Therapy:  Current Nutrition Therapy:   [address] Diet List:662570323}    Routes of Feeding: {CHP DME Other Feedings:949317435}  Liquids: {Slp liquid thickness:03560}  Daily Fluid Restriction: {CHP DME Yes amt example:438552219}  Last Modified Barium Swallow with Video (Video Swallowing Test): {Done Not Done GVFI:560621901}    Treatments at the Time of Hospital Discharge:   Respiratory Treatments: ***  Oxygen Therapy:  {Therapy; copd oxygen:42357}  Ventilator:    { CC Vent FMQZ:311437716}    Rehab Therapies: {THERAPEUTIC INTERVENTION:5474063415}  Weight Bearing Status/Restrictions: [address]  Weight Bearin}  Other Medical Equipment (for information only, NOT a DME order):  {EQUIPMENT:022159477}  Other Treatments: ***    Patient's personal belongings (please select all that are sent with patient):  {[facility] DME Belongings:274999715}    RN SIGNATURE:  {Esignature:630730813}    CASE MANAGEMENT/SOCIAL WORK SECTION    Inpatient Status Date: ***    Readmission Risk Assessment Score:  Readmission Risk              Risk of Unplanned Readmission:  19           Discharging to Facility/ Agency   Name:   Address:  Phone:  Fax:    Dialysis Facility (if applicable)   Name:  Address:  Dialysis Schedule:  Phone:  Fax:    / signature: {Esignature:881493856}    PHYSICIAN SECTION    Prognosis: Good    Condition at Discharge: Stable    Rehab Potential (if transferring to Rehab): Good    Recommended Labs or Other Treatments After Discharge: see pcp, cardiology, nephrology outpatient.      Physician Certification: I certify the above information and transfer of Nadir Keller  is necessary for the continuing treatment of the diagnosis listed and that he requires [address] for greater 30 days.      Update Admission H&P: No change in H&P    PHYSICIAN SIGNATURE:  Electronically signed by Myranda Hinojosa DO on 8/29/2022 at 10:51 AM

## 2022-08-26 NOTE — FLOWSHEET NOTE
Pt ambulated to the luna, became dizzy, assisted to stop walking more than 3 times, assisted back to chair,  Cardiology and dr Reynolds Persons notified

## 2022-08-27 PROBLEM — I21.4 NSTEMI (NON-ST ELEVATED MYOCARDIAL INFARCTION) (HCC): Status: ACTIVE | Noted: 2018-01-26

## 2022-08-27 PROBLEM — N17.9 AKI (ACUTE KIDNEY INJURY) (HCC): Status: ACTIVE | Noted: 2022-08-27

## 2022-08-27 PROBLEM — I95.1 ORTHOSTATIC HYPOTENSION: Status: ACTIVE | Noted: 2022-08-27

## 2022-08-27 LAB
ALBUMIN SERPL-MCNC: 3.4 G/DL (ref 3.5–5.2)
ANION GAP SERPL CALCULATED.3IONS-SCNC: 10 MMOL/L (ref 9–17)
BUN BLDV-MCNC: 19 MG/DL (ref 8–23)
CALCIUM SERPL-MCNC: 8 MG/DL (ref 8.6–10.4)
CHLORIDE BLD-SCNC: 108 MMOL/L (ref 98–107)
CO2: 22 MMOL/L (ref 20–31)
CREAT SERPL-MCNC: 1.5 MG/DL (ref 0.7–1.2)
GFR AFRICAN AMERICAN: 54 ML/MIN
GFR NON-AFRICAN AMERICAN: 44 ML/MIN
GFR SERPL CREATININE-BSD FRML MDRD: ABNORMAL ML/MIN/{1.73_M2}
GLUCOSE BLD-MCNC: 157 MG/DL (ref 75–110)
GLUCOSE BLD-MCNC: 176 MG/DL (ref 75–110)
GLUCOSE BLD-MCNC: 193 MG/DL (ref 75–110)
GLUCOSE BLD-MCNC: 72 MG/DL (ref 70–99)
GLUCOSE BLD-MCNC: 73 MG/DL (ref 75–110)
HCT VFR BLD CALC: 31.1 % (ref 40.7–50.3)
HEMOGLOBIN: 10.7 G/DL (ref 13–17)
MAGNESIUM: 1.8 MG/DL (ref 1.6–2.6)
MCH RBC QN AUTO: 31.8 PG (ref 25.2–33.5)
MCHC RBC AUTO-ENTMCNC: 34.4 G/DL (ref 28.4–34.8)
MCV RBC AUTO: 92.3 FL (ref 82.6–102.9)
NRBC AUTOMATED: 0 PER 100 WBC
PDW BLD-RTO: 12.1 % (ref 11.8–14.4)
PHOSPHORUS: 3.6 MG/DL (ref 2.5–4.5)
PLATELET # BLD: 199 K/UL (ref 138–453)
PMV BLD AUTO: 10.4 FL (ref 8.1–13.5)
POTASSIUM SERPL-SCNC: 4 MMOL/L (ref 3.7–5.3)
RBC # BLD: 3.37 M/UL (ref 4.21–5.77)
SODIUM BLD-SCNC: 140 MMOL/L (ref 135–144)
WBC # BLD: 9.1 K/UL (ref 3.5–11.3)

## 2022-08-27 PROCEDURE — 2060000000 HC ICU INTERMEDIATE R&B

## 2022-08-27 PROCEDURE — 2580000003 HC RX 258: Performed by: STUDENT IN AN ORGANIZED HEALTH CARE EDUCATION/TRAINING PROGRAM

## 2022-08-27 PROCEDURE — 99232 SBSQ HOSP IP/OBS MODERATE 35: CPT | Performed by: INTERNAL MEDICINE

## 2022-08-27 PROCEDURE — 6370000000 HC RX 637 (ALT 250 FOR IP): Performed by: INTERNAL MEDICINE

## 2022-08-27 PROCEDURE — 93005 ELECTROCARDIOGRAM TRACING: CPT | Performed by: INTERNAL MEDICINE

## 2022-08-27 PROCEDURE — 6370000000 HC RX 637 (ALT 250 FOR IP): Performed by: STUDENT IN AN ORGANIZED HEALTH CARE EDUCATION/TRAINING PROGRAM

## 2022-08-27 PROCEDURE — 82947 ASSAY GLUCOSE BLOOD QUANT: CPT

## 2022-08-27 PROCEDURE — 36415 COLL VENOUS BLD VENIPUNCTURE: CPT

## 2022-08-27 PROCEDURE — 80069 RENAL FUNCTION PANEL: CPT

## 2022-08-27 PROCEDURE — 85027 COMPLETE CBC AUTOMATED: CPT

## 2022-08-27 PROCEDURE — 6360000002 HC RX W HCPCS: Performed by: STUDENT IN AN ORGANIZED HEALTH CARE EDUCATION/TRAINING PROGRAM

## 2022-08-27 PROCEDURE — 83735 ASSAY OF MAGNESIUM: CPT

## 2022-08-27 RX ORDER — PANTOPRAZOLE SODIUM 40 MG/1
40 TABLET, DELAYED RELEASE ORAL
Status: DISCONTINUED | OUTPATIENT
Start: 2022-08-27 | End: 2022-08-29 | Stop reason: HOSPADM

## 2022-08-27 RX ORDER — PANTOPRAZOLE SODIUM 40 MG/1
40 TABLET, DELAYED RELEASE ORAL
Status: DISCONTINUED | OUTPATIENT
Start: 2022-08-27 | End: 2022-08-27

## 2022-08-27 RX ORDER — MAGNESIUM HYDROXIDE/ALUMINUM HYDROXICE/SIMETHICONE 120; 1200; 1200 MG/30ML; MG/30ML; MG/30ML
30 SUSPENSION ORAL EVERY 6 HOURS PRN
Status: DISCONTINUED | OUTPATIENT
Start: 2022-08-27 | End: 2022-08-29 | Stop reason: HOSPADM

## 2022-08-27 RX ORDER — AMIODARONE HYDROCHLORIDE 200 MG/1
200 TABLET ORAL DAILY
Status: DISCONTINUED | OUTPATIENT
Start: 2022-08-27 | End: 2022-08-29 | Stop reason: HOSPADM

## 2022-08-27 RX ADMIN — INSULIN GLARGINE 15 UNITS: 100 INJECTION, SOLUTION SUBCUTANEOUS at 21:00

## 2022-08-27 RX ADMIN — ALUMINUM HYDROXIDE, MAGNESIUM HYDROXIDE, AND SIMETHICONE 30 ML: 200; 200; 20 SUSPENSION ORAL at 07:39

## 2022-08-27 RX ADMIN — POLYETHYLENE GLYCOL 3350 17 G: 17 POWDER, FOR SOLUTION ORAL at 20:35

## 2022-08-27 RX ADMIN — PANTOPRAZOLE SODIUM 40 MG: 40 TABLET, DELAYED RELEASE ORAL at 09:38

## 2022-08-27 RX ADMIN — RIVAROXABAN 20 MG: 20 TABLET, FILM COATED ORAL at 17:07

## 2022-08-27 RX ADMIN — SODIUM CHLORIDE, PRESERVATIVE FREE 10 ML: 5 INJECTION INTRAVENOUS at 20:35

## 2022-08-27 RX ADMIN — ALUMINUM HYDROXIDE, MAGNESIUM HYDROXIDE, AND SIMETHICONE 30 ML: 200; 200; 20 SUSPENSION ORAL at 19:02

## 2022-08-27 RX ADMIN — AMIODARONE HYDROCHLORIDE 200 MG: 200 TABLET ORAL at 09:38

## 2022-08-27 RX ADMIN — AMLODIPINE BESYLATE 5 MG: 5 TABLET ORAL at 09:38

## 2022-08-27 RX ADMIN — SODIUM CHLORIDE: 9 INJECTION, SOLUTION INTRAVENOUS at 00:42

## 2022-08-27 RX ADMIN — PANTOPRAZOLE SODIUM 40 MG: 40 TABLET, DELAYED RELEASE ORAL at 17:54

## 2022-08-27 RX ADMIN — ONDANSETRON 4 MG: 2 INJECTION INTRAMUSCULAR; INTRAVENOUS at 07:34

## 2022-08-27 RX ADMIN — CLOPIDOGREL 75 MG: 75 TABLET, FILM COATED ORAL at 09:38

## 2022-08-27 RX ADMIN — ATORVASTATIN CALCIUM 80 MG: 80 TABLET, FILM COATED ORAL at 20:35

## 2022-08-27 ASSESSMENT — PAIN SCALES - GENERAL
PAINLEVEL_OUTOF10: 4
PAINLEVEL_OUTOF10: 0
PAINLEVEL_OUTOF10: 0

## 2022-08-27 NOTE — PROGRESS NOTES
[facility]  Office: [address], DO, Bernard Marie, DO, Ambreen Argueta, DO, Fabiana Amber Blood, DO, Maggy Hernandez MD, Etienne Gibson MD, Erika Duffy MD, Claudean Millard, MD,  Anitha Sanford MD, Brenda Cerda MD, Shanta Montoya, DO, Travis Shell MD,  Sherrill Posada MD, Ginger Soliz MD, Verla Kawasaki, DO, Jose Sauer MD, Buffy Cervantes MD, Jory Whittaker MD, Carlos Stallworth, DO, Janak Green MD, Garcia Fountain MD, Donna Escobar, CNP,  Trini Jhaveri, CNP, Mahin Cameron CNP, Raine Wyatt, CNP, Laurie Willis PA-C, James Payan, DNP, Jemima Orona, CNP, Surendra Huff, CNP, Caitlin Urias, CNP, Tim Saint, CNP, Phillip Muse, CNP, Tony Can, CNS, Jossue Cheema, [facility], Tere Birmingham, CNP, Lincoln Dias, [facility], Lul[place] Lemons, [facility]    Progress Note    8/27/2022    3:13 PM    Name:   Travis Erickson  MRN:     [id number]     [place]:      [de-identified]   Room:   2001/2001-01   Day:  5  Admit Date:  8/22/2022  8:22 PM    PCP:   Conrado Benavides MD  Code Status:  Full Code    Subjective:     C/C:   Chief Complaint   Patient presents with    Chest Pain     Interval History Status: not changed. Patient seen and examined, had an episode this morning of chest pain with bilateral hand numbness. Resolved on its own. Patient continues to have some dizziness although orthostatics this morning have improved. Patient remains bradycardic    Brief History:     From H&P  Travis Erickson is a 80 y.o.  male with history of coronary disease status post prior MI in 2018, prior stroke, type 2 diabetes, CKD 3 who presented with acute symptomatic tachycardia with chest pain with Chest Pain   and is admitted to the hospital for the management of Atrial fibrillation with RVR ([facility] [address] Patient with prior episode of chest pain 2 to 3 weeks ago status post evaluation at [facility].   Was seen in follow-up with [address] [place] [address] cardiology earlier in August.  Recommended for stress test which was scheduled for September. Patient describes abrupt onset of chest pain earlier this evening after standing up in the kitchen after eating. Symptoms persisted for 2-1/2 hours constant. Currently resolved since arrival to ED status post heparin/amio drip. Pain was initially Sharp aching quality midsternum with radiation to the left shoulder. Denied any prior episode of chest pain shortness of breath when he was doing yard work earlier that morning. Was out in the yard for approximately 30 minutes this morning. Did denies any heavy lifting. With chest pain episode patient did become acutely diaphoretic with nausea with mild pleurisy. + Tachycardia.  + Lightheadedness but denies near syncope or collapse. Denies prior DVT PE or recent travel. Status post heparin/amio drip in ED. In ED patient found to have heart rates in the 130s to 150s with A. fib status post loading with amiodarone drip. Chest pain has resolved since controlling tachycardia. Denies any prior issues with tachycardia, palpitations. Denies fevers or chills or flulike symptoms. Denies positional changes. Review of Systems:     Constitutional:  negative for chills, fevers, sweats  Respiratory:  negative for cough, dyspnea on exertion, shortness of breath, wheezing  Cardiovascular:  negative for chest pain, chest pressure/discomfort, lower extremity edema, palpitations  Gastrointestinal:  negative for abdominal pain, constipation, diarrhea, nausea, vomiting  Neurological:  negative for dizziness, headache    Medications: Allergies:     Allergies   Allergen Reactions    Sulfa Antibiotics Anaphylaxis       Current Meds:   Scheduled Meds:    pantoprazole  40 mg Oral QAM AC    sodium chloride flush  5-40 mL IntraVENous 2 times per day    [Held by provider] ranolazine  500 mg Oral BID    amiodarone  200 mg Oral BID    Followed by [START ON 8/30/2022] amiodarone  200 mg Oral Daily    rivaroxaban  20 mg Oral Daily    clopidogrel  75 mg Oral Daily    amLODIPine  5 mg Oral Daily    atorvastatin  80 mg Oral Nightly    insulin glargine  15 Units SubCUTAneous Nightly    [Held by provider] metoprolol succinate  12.5 mg Oral Daily    sodium chloride flush  5-40 mL IntraVENous 2 times per day    insulin lispro  0-8 Units SubCUTAneous TID WC    insulin lispro  0-4 Units SubCUTAneous Nightly     Continuous Infusions:    sodium chloride      sodium chloride      dextrose       PRN Meds: aluminum & magnesium hydroxide-simethicone, sodium chloride flush, sodium chloride, acetaminophen, sodium chloride flush, sodium chloride flush, sodium chloride flush, sodium chloride, ondansetron **OR** ondansetron, polyethylene glycol, acetaminophen **OR** acetaminophen, glucose, dextrose bolus **OR** dextrose bolus, glucagon (rDNA), dextrose    Data:     Past Medical History:   has a past medical history of Acute myocardial ischemia (HCC), Acute sinusitis, Cerebral artery occlusion with cerebral infarction [facility]), Chest pain, Diabetes mellitus ([facility] [address] H/O cataract, Hyperlipidemia, Hypertension, MI (myocardial infarction) ([facility] [address] Neck stiffness, Partial small bowel obstruction ([facility] [address] Peyronie's disease, Rotator cuff tendonitis, and TIA (transient ischemic attack). Social History:   reports that he has quit smoking. He has a 4.00 pack-year smoking history. He has never used smokeless tobacco. He reports that he does not drink alcohol and does not use drugs. Family History:   Family History   Problem Relation Age of Onset    Cancer Mother         oral    Stroke Father     High Blood Pressure Brother     Heart Disease Brother     Cancer Maternal Grandmother     Diabetes type 2  Son     Heart Attack Son     Heart Disease Maternal Grandfather     Stroke Paternal Grandmother     Heart Disease Paternal Grandfather         ?        Vitals:  /67   Pulse 56   Temp 98.4 °F (36.9 °C) (Oral)   Resp 18   Ht 5' 10\" (1.778 m)   Wt 175 lb 11.3 oz (79.7 kg)   SpO2 97%   BMI 25.21 kg/m²   Temp (24hrs), Av.9 °F (36.6 °C), Min:97 °F (36.1 °C), Max:98.5 °F (36.9 °C)    Recent Labs     22  1547 22  2050 22  0732 22  1124   POCGLU 191* 252* 73* 193*         I/O (24Hr): Intake/Output Summary (Last 24 hours) at 2022 1513  Last data filed at 2022 1135  Gross per 24 hour   Intake 1310 ml   Output 1300 ml   Net 10 ml         Labs:  Hematology:  Recent Labs     22  0710 22  0711   WBC 6.7 7.6 9.1   RBC 3.33* 3.56* 3.37*   HGB 10.3* 10.8* 10.7*   HCT 31.6* 33.5* 31.1*   MCV 94.9 94.1 92.3   MCH 30.9 30.3 31.8   MCHC 32.6 32.2 34.4   RDW 12.4 12.3 12.1    185 199   MPV 10.9 10.6 10.4       Chemistry:  Recent Labs     22  0710 22  1443 22  0711    138 136 140   K 4.2 3.8 4.4 4.0    105 106 108*   CO2 23 23 22 22   GLUCOSE 91 77 173* 72   BUN 18 18 18 19   CREATININE 1.43* 1.54* 1.29* 1.50*   MG 1.9 1.8  --  1.8   ANIONGAP 10 10 8* 10   LABGLOM 47* 43* 53* 44*   GFRAA 57* 52* >60 54*   CALCIUM 8.5* 8.7 8.4* 8.0*   PHOS 3.4  --   --  3.6       Recent Labs     22  0623 22  1648 22  1732 22  0634 22  1146 22  1547 22  2050 22  0711 22  0732 22  1124   PROT  --   --  6.2*  --   --   --   --   --   --   --   --    LABALBU 3.5  --   --   --   --   --   --   --  3.4*  --   --    POCGLU  --    < >  --    < > 84 94 191* 252*  --  73* 193*    < > = values in this interval not displayed. ABG:No results found for: POCPH, PHART, PH, POCPCO2, TGS4OLN, PCO2, POCPO2, PO2ART, PO2, POCHCO3, NYK8CQF, HCO3, NBEA, PBEA, BEART, BE, THGBART, THB, DYC4YTZ, FGVB0DZC, N1YTQCHA, O2SAT, FIO2  No results found for: SPECIAL  No results found for: CULTURE    Radiology:  XR CHEST PORTABLE    Result Date: 2022  Clear lungs.

## 2022-08-27 NOTE — PROGRESS NOTES
[facility]   Progress Note                   Date:   8/27/2022  Patient name: Rabia Live  Date of admission:  8/22/2022  8:22 PM  MRN:   [id number]  YOB: 1935  PCP: Saulo Tucker MD    Reason for Admission: Atrial fibrillation with RVR ([facility] [address] [I48.91]    Subjective:       Clinical Changes / Abnormalities:   Patient seen and examined. Denies chest pain or shortness of breath. dizziness improved , complained of burning sensation that started in his upper abdomen and radiated to his mid chest area earlier when he woke up but feels better  Tele/vitals/labs reviewed . Sinus dar on monitor . Am toprol dose was held by RN this am    Medications:   Scheduled Meds:   pantoprazole  40 mg Oral QAM AC    sodium chloride flush  5-40 mL IntraVENous 2 times per day    [Held by provider] ranolazine  500 mg Oral BID    amiodarone  200 mg Oral BID    Followed by    Efren Tee ON 8/30/2022] amiodarone  200 mg Oral Daily    rivaroxaban  20 mg Oral Daily    clopidogrel  75 mg Oral Daily    amLODIPine  5 mg Oral Daily    atorvastatin  80 mg Oral Nightly    insulin glargine  15 Units SubCUTAneous Nightly    [Held by provider] metoprolol succinate  12.5 mg Oral Daily    sodium chloride flush  5-40 mL IntraVENous 2 times per day    insulin lispro  0-8 Units SubCUTAneous TID WC    insulin lispro  0-4 Units SubCUTAneous Nightly     Continuous Infusions:   sodium chloride      sodium chloride      dextrose       CBC:   Recent Labs     08/25/22  0318 08/26/22  0710 08/27/22  0711   WBC 6.7 7.6 9.1   HGB 10.3* 10.8* 10.7*    185 199       BMP:    Recent Labs     08/26/22  0710 08/26/22  1443 08/27/22  0711    136 140   K 3.8 4.4 4.0    106 108*   CO2 23 22 22   BUN 18 18 19   CREATININE 1.54* 1.29* 1.50*   GLUCOSE 77 173* 72       Hepatic:   No results for input(s): AST, ALT, ALB, BILITOT, ALKPHOS in the last 72 hours.     Troponin:   No results for input(s): TROPHS in the last 72 hours.    BNP: No results for input(s): BNP in the last 72 hours. Lipids: No results for input(s): CHOL, HDL in the last 72 hours. Invalid input(s): LDLCALCU  INR:   No results for input(s): INR in the last 72 hours. EKG:    Date: 08/23/22  Reading: No acute ischemia  NSR with T wave [id number] in lateral leads     LAST ECHO:  Date: 01/26/2018  Findings Summary:  Left ventricle is normal in size. Global left ventricular systolic function  is low normal. Estimated ejection fraction is 50 % . Normal right ventricular size and function. No significant valvular regurgitation or stenosis seen. LAST Stress Test:   Date of last ST:  Major Findings:     LAST Cardiac Angiography:.  Date:  Findings:  Cardiac Arteries and Lesion Findings     LMCA: 20-30% distal lesion     LAD: 40% mid lesion     LCx: Mild irregularities 10-20%. 70% proximal OM 1 lesion and 99% OM 2  lesion, both <2.0 caliber vessels     RCA: 95% proximal lesion, patent mid stent and patent ostial PDA stent, 70%  PDA lesion and 99% RPL lesion <2.0 mm vessel       Lesion on Prox RCA: Proximal subsection. 95% stenosis 60 mm length reduced    to 0%. Pre procedure MARK III flow was noted. Post Procedure MARK III    flow was present. Good runoff was present. The lesion was diagnosed as    High Risk (C). Devices used       - Videobot Prowater Flex 180 cm. Number of passes: 1.       - Trek Balloon 2.5mm x 12mm. 1 inflation(s) to a max pressure of: 16    antonia. - 6 fr GuideLiner. Number of passes: 1.       - Xience Alpine 3.0 x 38 RAZA. 1 inflation(s) to a max pressure of: 16    antonia. - Xience Alpine 3.0 x 18 RAZA. 1 inflation(s) to a max pressure of: 18    antonia. - NC Quantum [place] Balloon 3.5x15. 1 inflation(s) to a max pressure of:    14 antonia. - NC Quantum [place] Balloon 3.5x15. 1 inflation(s) to a max pressure of:    18 antonia. - NC Quantum [place] Balloon 3.5x15. 1 inflation(s) to a max pressure of:    18 antonia.        - NC Quantum [place] Balloon 3.5x15. 1 inflation(s) to a max pressure of:    18 antonia. Lesion on R PDA: Mid subsection. 70% stenosis 10 mm length reduced to 0%. Pre procedure MARK III flow was noted. Post Procedure MARK III flow was    present. Good runoff was present. The lesion was diagnosed as Low Risk    (A). Devices used       - Xience Alpine 2.5 x 12 RAZA. 1 inflation(s) to a max pressure of: 14    antonia. Coronary Tree      Dominance: Right     LV Analysis  LV function assessed as:Normal.  Ejection Fraction  +----------------------------------------+---------------------------------+  ! Method                                  ! EF%                              ! +----------------------------------------+---------------------------------+  ! Estimated                               ! 50                               !  +----------------------------------------+---------------------------------+         Cardiac Arteries and Lesion Findings       LMCA: Mild irregularities 10-20%. LAD: Mid diffuse 50% stenosis same as on last angiogram.   D1 is intermediate with mid diffuse 60% stenosis   D2 is small and patent     LCx: Mild irregularities 20-30%. OM1 is small and patent   OM2 is small with ostial 100% occlusion and left to left collaterals     RCA: Proximal 100% chronic in-stent restenosis with good left to right   collaterals      Coronary Tree        Dominance: Right      Procedure Summary        1. Chronic total occlusion of RCA with left to right Collaterals    2. Moderate Nonobstructive disease in LAD/D1 same as on last angiogram    3. Normal LVEDP. LV gram not done due to renal insufficiency        Recommendations        Routine Post Diagnostic Cath orders. Optimize medical therapy for CAD    Resume anticoagulation    Risk factor modification.       Estimated Blood Loss:            [x] Minimal 10 cc   [] Minimal < 25 cc      [] Moderate 25-50 cc         [] >50 cc        Electronically signed by Clarisa Elizondo MD on 8/26/2022 at 10:17 AM  Cardiovascular fellow  [address] [facility] meet Lee Barreto MD, [address], [place]                   Objective:   Vitals: /67   Pulse 56   Temp 98.4 °F (36.9 °C) (Oral)   Resp 18   Ht 5' 10\" (1.778 m)   Wt 175 lb 11.3 oz (79.7 kg)   SpO2 97%   BMI 25.21 kg/m²   General appearance: alert and cooperative with exam  HEENT: Head: Normocephalic, no lesions, without obvious abnormality. Neck: no JVD, trachea midline, no adenopathy  Lungs: Clear to auscultation  Heart: Regular rate and rhythm, s1/s2 auscultated, no murmurs  Abdomen: soft, non-tender, bowel sounds active  Extremities: no edema  Neurologic: not done        Assessment / Acute Cardiac Problems:   NSTEMI, troponins are 13 and 28, St depression in V4-V6 and AVL. A. 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)     Essential hypertension     CAD (coronary artery disease)     Bradycardia     Demand ischemia (HCC)     Unstable angina (HCC)     Anxiety     Chronic rhinitis     Diabetic retinopathy (HCC)     Dizziness     Hyperlipidemia     Insomnia     Lumbar spondylolysis     Stroke syndrome     AK (actinic keratosis)     Chronic sinusitis     Anemia     Arthritis     Paroxysmal atrial fibrillation (HCC)     S/P skin biopsy     Squamous cell carcinoma in situ     Atrial fibrillation with RVR (HCC)     Lactic acid acidosis     CKD (chronic kidney disease) stage 3, GFR 30-59 ml/min ([facility])      Plan of Treatment:       Status post cardiac catheterization with no intervention as above  Afib. Currently sinus dar -   Will decrease amio dose to once a day , continue  BB with parameter and Xarelto. Dizziness.   Discussed case with EP Dr. Kiley Chen who recommended Tilt table on Monday  Episode of chest /abdomen pain- could be indigestion - recommend PPI   Keep K>4, mg>2          Electronically signed by SANAM Turner NP on 8/27/2022 at 3:16 PM  [address]  [phone]

## 2022-08-27 NOTE — PROGRESS NOTES
Renal Progress Note      Subjective:     Patient was seen and examined. Patient had cardiac catheterization done 8/26/22, found to have 100% proximal RCA occlusion with chronic in-stent stenosis with left-to-right collaterals. Mild diffuse disease in the LAD and 20 to 30% lesion in the left circumflex. OM 2 had ostial occlusion with good collaterals. No intervention done. Patient is making good urine. Creatinine was 1.5 this morning. Work-up so far shows right kidney 10 left 9.1 cm without any echogenicity. UPC less than 0.1. Serological work-up negative so far although immunofixation pending kappa lambda free light chain ratio slightly high at 2. Patient denies any shortness of breath, chest pain, PND, orthopnea. No cough phlegm or hemoptysis. No fever or chills. No more chest pains. 8/27/22 shows stable Cr at baseline. K 4.0 Na 140, Mg 1.8  He was planning to go home this am, but had another episode of retrosternal aching pain that occurred right when he work up at 0700, feels good now, tolerating diet etc, and primary monitoring. Outpatient Medications:     Medications Prior to Admission: magnesium oxide (MAG-OX) 400 MG tablet, Take 1 tablet by mouth in the morning.   ULTICARE SHORT PEN NEEDLES 31G X 8 MM MISC, INJECT 1 NEEDLE INTO THE SKIN DAILY  Accu-Chek FastClix Lancets MISC, USE ONE LANCET TO TEST TWICE A DAY AND AS NEEDED  blood glucose test strips (FREESTYLE TEST STRIPS) strip, USE ONE STRIP TO TEST TWICE A DAY AND AS NEEDED  atorvastatin (LIPITOR) 80 MG tablet, TAKE 1 TABLET NIGHTLY  clopidogrel (PLAVIX) 75 MG tablet, TAKE 1 TABLET DAILY  metoprolol succinate (TOPROL XL) 25 MG extended release tablet, Take 0.5 tablets by mouth daily  LANTUS SOLOSTAR 100 UNIT/ML injection pen, INJECT 15 UNITS INTO THE SKIN NIGHTLY  XARELTO 20 MG TABS tablet, TAKE 1 TABLET DAILY  [DISCONTINUED] metFORMIN (GLUCOPHAGE-XR) 500 MG extended release tablet, Take 3 tablets by mouth daily (with breakfast)  amLODIPine (NORVASC) 5 MG tablet, TAKE 1 TABLET DAILY  [DISCONTINUED] lisinopril-hydroCHLOROthiazide (PRINZIDE;ZESTORETIC) 20-12.5 MG per tablet, TAKE 1 TABLET DAILY  Blood Glucose Monitoring Suppl (FREESTYLE FREEDOM LITE) w/Device KIT, 1 kit by Does not apply route daily as needed (check sugars bid and prn)  acetaminophen (TYLENOL) 500 MG tablet, Take 500 mg by mouth every 6 hours as needed for Pain  Lancet Device MISC, 1 Device by Does not apply route once for 1 dose  nitroGLYCERIN (NITROSTAT) 0.4 MG SL tablet, Place 1 tablet under the tongue every 5 minutes as needed for Chest pain  Coenzyme Q10 (CO Q-10) 30 MG CAPS, Take 30 mg by mouth daily  Ascorbic Acid (VITAMIN C) 250 MG tablet, Take 500 mg by mouth daily  ferrous sulfate 325 (65 FE) MG tablet, Take 325 mg by mouth daily     Current Medications:     Scheduled Meds:    pantoprazole  40 mg Oral QAM AC    sodium chloride flush  5-40 mL IntraVENous 2 times per day    [Held by provider] ranolazine  500 mg Oral BID    amiodarone  200 mg Oral BID    Followed by    Obey Baltazar ON 8/30/2022] amiodarone  200 mg Oral Daily    rivaroxaban  20 mg Oral Daily    clopidogrel  75 mg Oral Daily    amLODIPine  5 mg Oral Daily    atorvastatin  80 mg Oral Nightly    insulin glargine  15 Units SubCUTAneous Nightly    [Held by provider] metoprolol succinate  12.5 mg Oral Daily    sodium chloride flush  5-40 mL IntraVENous 2 times per day    insulin lispro  0-8 Units SubCUTAneous TID     insulin lispro  0-4 Units SubCUTAneous Nightly     Continuous Infusions:    sodium chloride      sodium chloride 125 mL/hr at 08/27/22 0058    sodium chloride      dextrose       PRN Meds:  aluminum & magnesium hydroxide-simethicone, sodium chloride flush, sodium chloride, acetaminophen, sodium chloride flush, sodium chloride flush, sodium chloride flush, sodium chloride, ondansetron **OR** ondansetron, polyethylene glycol, acetaminophen **OR** acetaminophen, glucose, dextrose bolus **OR** dextrose bolus, glucagon (rDNA), dextrose    Input/Output:       I/O last 3 completed shifts: In: 960 [P.O.:960]  Out: 1800 [Urine:1800]. Patient Vitals for the past 96 hrs (Last 3 readings):   Weight   22 0700 175 lb 11.3 oz (79.7 kg)   22 1831 180 lb (81.6 kg)       Vital Signs:   Temperature:  Temp: 97 °F (36.1 °C)  TMax:   Temp (24hrs), Av.7 °F (36.5 °C), Min:97 °F (36.1 °C), Max:98.5 °F (36.9 °C)    Respirations:  Resp: 16  Pulse:   Heart Rate: 51  BP:    BP: (!) 143/58  BP Range: Systolic (86URU), NSS:701 , Min:82 , UPR:730       Diastolic (63LJJ), SON:50, Min:50, Max:69      Physical Examination:     General:  AAO x 3, speaking in full sentences, no accessory muscle use. HEENT: Atraumatic, normocephalic, no throat congestion, moist mucosa. Eyes:   Pupils equal, round and reactive to light, EOMI. Neck:   No JVD, no thyromegaly, no lymphadenopathy. Chest:              Bilateral vesicular breath sounds, no rales or wheezes. Cardiac:  S1 S2 RR, no murmurs, gallops or rubs, JVP not raised. Abdomen: Soft, non-tender, no masses or organomegaly, BS audible. :   No suprapubic or flank tenderness. Neuro:             AAO x 3, No FND. SKIN:  No rashes, good skin turgor. Extremities:  No edema.   Labs:       Recent Labs     22  0318 22  0710 22  0711   WBC 6.7 7.6 9.1   RBC 3.33* 3.56* 3.37*   HGB 10.3* 10.8* 10.7*   HCT 31.6* 33.5* 31.1*   MCV 94.9 94.1 92.3   MCH 30.9 30.3 31.8   MCHC 32.6 32.2 34.4   RDW 12.4 12.3 12.1    185 199   MPV 10.9 10.6 10.4      BMP:   Recent Labs     22  0710 22  1443 22  0711    136 140   K 3.8 4.4 4.0    106 108*   CO2  22   BUN 18 18 19   CREATININE 1.54* 1.29* 1.50*   GLUCOSE 77 173* 72   CALCIUM 8.7 8.4* 8.0*      Phosphorus:     Recent Labs     22  0318 22  0711   PHOS 3.4 3.6     Magnesium:    Recent Labs     22  0318 22  0710 22  0711   MG 1.9 1.8 1.8 Albumin:    Recent Labs     08/25/22  0318 08/27/22  0711   LABALBU 3.5 3.4*     SPEP:  Lab Results   Component Value Date/Time    PROT 6.2 08/25/2022 04:48 PM    ALBCAL 3.6 08/25/2022 04:48 PM    ALBPCT 59 08/25/2022 04:48 PM    LABALPH 0.2 08/25/2022 04:48 PM    LABALPH 0.8 08/25/2022 04:48 PM    A1PCT 4 08/25/2022 04:48 PM    A2PCT 13 08/25/2022 04:48 PM    LABBETA 0.7 08/25/2022 04:48 PM    BETAPCT 11 08/25/2022 04:48 PM    GAMGLOB 0.9 08/25/2022 04:48 PM    GGPCT 15 08/25/2022 04:48 PM    PATH ELECTRONICALLY SIGNED. Kyle Engle M.D. 08/25/2022 04:48 PM    PATH ELECTRONICALLY SIGNED. Kyle Engle M.D. 08/25/2022 04:48 PM     C3:     Lab Results   Component Value Date/Time    C3 122 08/25/2022 04:48 PM     C4:     Lab Results   Component Value Date/Time    C4 29 08/25/2022 04:48 PM     Urinalysis/Chemistries:      Lab Results   Component Value Date/Time    NITRU NEGATIVE 10/04/2015 07:55 PM    COLORU YELLOW 10/04/2015 07:55 PM    PHUR 5.0 10/04/2015 07:55 PM    WBCUA None 10/04/2015 07:55 PM    RBCUA None 10/04/2015 07:55 PM    MUCUS 1+ 10/04/2015 07:55 PM    TRICHOMONAS NOT REPORTED 10/04/2015 07:55 PM    YEAST NOT REPORTED 10/04/2015 07:55 PM    BACTERIA NOT REPORTED 10/04/2015 07:55 PM    SPECGRAV 1.019 10/04/2015 07:55 PM    LEUKOCYTESUR NEGATIVE 10/04/2015 07:55 PM    UROBILINOGEN Normal 10/04/2015 07:55 PM    [address] NEGATIVE 10/04/2015 07:55 PM    GLUCOSEU TRACE 10/04/2015 07:55 PM    KETUA NEGATIVE 10/04/2015 07:55 PM    AMORPHOUS 1+ 10/04/2015 07:55 PM      Urine Creatinine:     Lab Results   Component Value Date/Time    LABCREA 44.8 08/25/2022 01:32 PM     Assessment:     1. 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    2.  Acute non-ST relation MI presenting as chest pain with positive stress test, cardiac catheterization done no

## 2022-08-28 LAB
ALBUMIN SERPL-MCNC: 3 G/DL (ref 3.5–5.2)
ANION GAP SERPL CALCULATED.3IONS-SCNC: 9 MMOL/L (ref 9–17)
BUN BLDV-MCNC: 19 MG/DL (ref 8–23)
CALCIUM SERPL-MCNC: 8.2 MG/DL (ref 8.6–10.4)
CHLORIDE BLD-SCNC: 109 MMOL/L (ref 98–107)
CO2: 21 MMOL/L (ref 20–31)
CREAT SERPL-MCNC: 1.46 MG/DL (ref 0.7–1.2)
GFR AFRICAN AMERICAN: 55 ML/MIN
GFR NON-AFRICAN AMERICAN: 46 ML/MIN
GFR SERPL CREATININE-BSD FRML MDRD: ABNORMAL ML/MIN/{1.73_M2}
GLUCOSE BLD-MCNC: 147 MG/DL (ref 75–110)
GLUCOSE BLD-MCNC: 158 MG/DL (ref 75–110)
GLUCOSE BLD-MCNC: 175 MG/DL (ref 75–110)
GLUCOSE BLD-MCNC: 227 MG/DL (ref 75–110)
GLUCOSE BLD-MCNC: 67 MG/DL (ref 70–99)
GLUCOSE BLD-MCNC: 68 MG/DL (ref 75–110)
GLUCOSE BLD-MCNC: 88 MG/DL (ref 75–110)
HCT VFR BLD CALC: 28.5 % (ref 40.7–50.3)
HEMOGLOBIN: 10.1 G/DL (ref 13–17)
MAGNESIUM: 1.9 MG/DL (ref 1.6–2.6)
MCH RBC QN AUTO: 32.6 PG (ref 25.2–33.5)
MCHC RBC AUTO-ENTMCNC: 35.4 G/DL (ref 28.4–34.8)
MCV RBC AUTO: 91.9 FL (ref 82.6–102.9)
NRBC AUTOMATED: 0 PER 100 WBC
PDW BLD-RTO: 12.4 % (ref 11.8–14.4)
PHOSPHORUS: 3.5 MG/DL (ref 2.5–4.5)
PLATELET # BLD: 224 K/UL (ref 138–453)
PMV BLD AUTO: 11.5 FL (ref 8.1–13.5)
POTASSIUM SERPL-SCNC: 4.2 MMOL/L (ref 3.7–5.3)
RBC # BLD: 3.1 M/UL (ref 4.21–5.77)
SODIUM BLD-SCNC: 139 MMOL/L (ref 135–144)
WBC # BLD: 6.5 K/UL (ref 3.5–11.3)

## 2022-08-28 PROCEDURE — 80069 RENAL FUNCTION PANEL: CPT

## 2022-08-28 PROCEDURE — 6370000000 HC RX 637 (ALT 250 FOR IP): Performed by: STUDENT IN AN ORGANIZED HEALTH CARE EDUCATION/TRAINING PROGRAM

## 2022-08-28 PROCEDURE — 97166 OT EVAL MOD COMPLEX 45 MIN: CPT

## 2022-08-28 PROCEDURE — 99232 SBSQ HOSP IP/OBS MODERATE 35: CPT | Performed by: INTERNAL MEDICINE

## 2022-08-28 PROCEDURE — 85027 COMPLETE CBC AUTOMATED: CPT

## 2022-08-28 PROCEDURE — 6370000000 HC RX 637 (ALT 250 FOR IP): Performed by: INTERNAL MEDICINE

## 2022-08-28 PROCEDURE — 6370000000 HC RX 637 (ALT 250 FOR IP): Performed by: SURGERY

## 2022-08-28 PROCEDURE — 2060000000 HC ICU INTERMEDIATE R&B

## 2022-08-28 PROCEDURE — 97116 GAIT TRAINING THERAPY: CPT

## 2022-08-28 PROCEDURE — 2580000003 HC RX 258: Performed by: STUDENT IN AN ORGANIZED HEALTH CARE EDUCATION/TRAINING PROGRAM

## 2022-08-28 PROCEDURE — 83735 ASSAY OF MAGNESIUM: CPT

## 2022-08-28 PROCEDURE — 97161 PT EVAL LOW COMPLEX 20 MIN: CPT

## 2022-08-28 PROCEDURE — 97535 SELF CARE MNGMENT TRAINING: CPT

## 2022-08-28 PROCEDURE — 36415 COLL VENOUS BLD VENIPUNCTURE: CPT

## 2022-08-28 RX ORDER — PANTOPRAZOLE SODIUM 40 MG/1
40 TABLET, DELAYED RELEASE ORAL
Qty: 30 TABLET | Refills: 3 | Status: SHIPPED | OUTPATIENT
Start: 2022-08-28 | End: 2022-09-01 | Stop reason: SDUPTHER

## 2022-08-28 RX ORDER — AMIODARONE HYDROCHLORIDE 200 MG/1
200 TABLET ORAL DAILY
Qty: 30 TABLET | Refills: 0 | Status: SHIPPED | OUTPATIENT
Start: 2022-08-29 | End: 2022-09-09 | Stop reason: SDUPTHER

## 2022-08-28 RX ADMIN — SODIUM CHLORIDE, PRESERVATIVE FREE 10 ML: 5 INJECTION INTRAVENOUS at 20:50

## 2022-08-28 RX ADMIN — SODIUM CHLORIDE, PRESERVATIVE FREE 10 ML: 5 INJECTION INTRAVENOUS at 07:50

## 2022-08-28 RX ADMIN — INSULIN GLARGINE 15 UNITS: 100 INJECTION, SOLUTION SUBCUTANEOUS at 20:34

## 2022-08-28 RX ADMIN — CLOPIDOGREL 75 MG: 75 TABLET, FILM COATED ORAL at 07:50

## 2022-08-28 RX ADMIN — PANTOPRAZOLE SODIUM 40 MG: 40 TABLET, DELAYED RELEASE ORAL at 14:59

## 2022-08-28 RX ADMIN — ATORVASTATIN CALCIUM 80 MG: 80 TABLET, FILM COATED ORAL at 20:44

## 2022-08-28 RX ADMIN — RIVAROXABAN 20 MG: 20 TABLET, FILM COATED ORAL at 17:39

## 2022-08-28 RX ADMIN — SODIUM CHLORIDE, PRESERVATIVE FREE 10 ML: 5 INJECTION INTRAVENOUS at 07:51

## 2022-08-28 RX ADMIN — ALUMINUM HYDROXIDE, MAGNESIUM HYDROXIDE, AND SIMETHICONE 30 ML: 200; 200; 20 SUSPENSION ORAL at 07:18

## 2022-08-28 RX ADMIN — PANTOPRAZOLE SODIUM 40 MG: 40 TABLET, DELAYED RELEASE ORAL at 06:23

## 2022-08-28 RX ADMIN — AMLODIPINE BESYLATE 5 MG: 5 TABLET ORAL at 07:50

## 2022-08-28 RX ADMIN — AMIODARONE HYDROCHLORIDE 200 MG: 200 TABLET ORAL at 07:50

## 2022-08-28 RX ADMIN — Medication 16 G: at 07:19

## 2022-08-28 RX ADMIN — POLYETHYLENE GLYCOL 3350 17 G: 17 POWDER, FOR SOLUTION ORAL at 14:59

## 2022-08-28 ASSESSMENT — PAIN DESCRIPTION - ORIENTATION: ORIENTATION: UPPER

## 2022-08-28 ASSESSMENT — PAIN DESCRIPTION - LOCATION: LOCATION: CHEST

## 2022-08-28 ASSESSMENT — PAIN SCALES - GENERAL
PAINLEVEL_OUTOF10: 0
PAINLEVEL_OUTOF10: 3

## 2022-08-28 ASSESSMENT — PAIN DESCRIPTION - DESCRIPTORS: DESCRIPTORS: BURNING

## 2022-08-28 NOTE — PROGRESS NOTES
Occupational Therapy  Facility/Department: [facility] CAR 2- STEPDOWN  Occupational Therapy Initial Assessment    Name: Gael Montenegro  : 1935  MRN: [id number]  Date of Service: 2022    Discharge Recommendations:  Patient would benefit from continued therapy after discharge  OT Equipment Recommendations  Equipment Needed: No       Patient Diagnosis(es): The primary encounter diagnosis was Atrial fibrillation with RVR ([facility] [address] A diagnosis of Stage 3b chronic kidney disease ([facility] [address] was also pertinent to this visit. Past Medical History:  has a past medical history of Acute myocardial ischemia ([facility] [address] Acute sinusitis, Cerebral artery occlusion with cerebral infarction [facility]), Chest pain, Diabetes mellitus ([facility] [address] H/O cataract, Hyperlipidemia, Hypertension, MI (myocardial infarction) ([facility] [address] Neck stiffness, Partial small bowel obstruction ([facility] [address] Peyronie's disease, Rotator cuff tendonitis, and TIA (transient ischemic attack). Past Surgical History:  has a past surgical history that includes Cardiac surgery (2018); Cardiac catheterization; Cataract removal with implant (Left); and skin biopsy. Assessment   Performance deficits / Impairments: Decreased functional mobility ; Decreased ADL status; Decreased endurance;Decreased balance;Decreased high-level IADLs  Prognosis: Good  Decision Making: Medium Complexity  REQUIRES OT FOLLOW-UP: Yes  Activity Tolerance  Activity Tolerance: Patient Tolerated treatment well        Plan   Plan  Times per Week: 1-3x     Restrictions  Restrictions/Precautions  Restrictions/Precautions: Fall Risk, General Precautions  Required Braces or Orthoses?: No  Position Activity Restriction  Other position/activity restrictions: up w/A    Subjective   General  Patient assessed for rehabilitation services?: Yes  Family / Caregiver Present: No  Diagnosis: A-fib w/ RVR, chest pain  Subjective  Subjective: Pt reported 1/10 pain in chest d/t heart burn at start of session Social/Functional History  Social/Functional History  Lives With: Spouse  Type of Home: House  Home Layout: One level  Home Access: Stairs to enter without rails  Entrance Stairs - Number of Steps: 2  Entrance Stairs - Rails: None  Bathroom Shower/Tub: Walk-in shower  Bathroom Toilet: Handicap height  Bathroom Equipment:  (reports none)  Home Equipment:  (no DME)  ADL Assistance: Independent  Homemaking Assistance: Independent  Homemaking Responsibilities:  (wife performs majority of cooking, pt performs cleaning, vacuuming.)  Ambulation Assistance: Independent  Transfer Assistance: Independent  Active : Yes  Mode of Transportation: Car  Occupation: Retired  Type of Occupation:   Leisure & Hobbies: yardwork, mowing  Additional Comments: Pt reports that his wife is retired and able to assist as needed. Objective   SpO2: 96 %  O2 Device: None (Room air)             Safety Devices  Type of Devices: Call light within reach;Gait belt; Heels elevated for pressure relief;Patient at risk for falls; Left in chair;Nurse notified  Restraints  Restraints Initially in Place: No  Bed Mobility Training  Bed Mobility Training: Yes  Supine to Sit: Supervision  Scooting: Supervision  Balance  Sitting: Intact (Pt sat unsupported EOB at SUP ~8 mins.)  Standing: Intact (Pt stood without use of RW at SBA. Pt requiring no rest breaks.)  Transfer Training  Transfer Training: Yes  Sit to Stand: Stand-by assistance  Stand to Sit: Stand-by assistance  Gait  Overall Level of Assistance: Stand-by assistance (Pt demo'd func mob around room/unit at SBA without use of LRD. Pt required no rest breaks.)     AROM: Within functional limits (B hands, elbows ,and shoulders AROM WFL. )  PROM: Within functional limits  Strength: Within functional limits (B hands 5/5, B elbows 5/5, B shoulders 5/5.)  Coordination: Within functional limits  Tone: Normal  Sensation: Intact  ADL  Feeding: Independent;Setup; Increased time to complete  Grooming: Supervision;Setup; Increased time to complete  UE Bathing: Supervision;Setup; Increased time to complete  LE Bathing: Supervision;Setup; Increased time to complete  UE Dressing: Supervision;Setup; Increased time to complete  LE Dressing: Supervision;Setup; Increased time to complete  Toileting: Stand by assistance  Additional Comments: Pt began session supine in bed. Pt sat unsupported EOB at SUP, demo'd doffing/donning R sock using figure-4 method. Pt demo'd donning gown on backside at SUP. Pt demo'd func mob around room/unit without use of LRD at SBA. Pt stood sinkside, demo'd grooming task of face washing using BUEs to wash face at SBA. Pt stood sinkside, demo'd oral hygiene task using BUEs to open/close toothpaste and mouthwash at SBA. Pt ended session seated in recliner. Vision  Vision: Impaired  Vision Exceptions: Wears glasses for distance  Hearing  Hearing: Exceptions to [facility]  Hearing Exceptions: Hard of hearing/hearing concerns (R ear worse)      Cognition  Overall Cognitive Status: WFL  Orientation  Overall Orientation Status: Within Functional Limits                  Education Given To: Patient  Education Provided: Role of Therapy;Plan of Care;ADL Adaptive Strategies;Transfer Training; Fall Prevention Strategies  Education Provided Comments: pt educated on tripping hazards  Education Method: Verbal  Barriers to Learning: None  Education Outcome: Verbalized understanding  LUE AROM (degrees)  LUE AROM : WFL (L elbow and shoulder AROM WFL)  Left Hand AROM (degrees)  Left Hand AROM: WFL  RUE AROM (degrees)  RUE AROM : WFL (R elbow and shoulder AROM WFL)  Right Hand AROM (degrees)  Right Hand AROM: [facility]        AM-PAC Score        AM-PAC Inpatient Daily Activity Raw Score: 19 (08/28/22 1031)  AM-PAC Inpatient ADL T-Scale Score : 40.22 (08/28/22 1031)  ADL Inpatient CMS 0-100% Score: 42.8 (08/28/22 1031)  ADL Inpatient CMS G-Code Modifier : CK (08/28/22 1031)       Goals  Short Term Goals  Time Frame for Short term goals: Pt will by discharge  Short Term Goal 1: demo UB/LB bathing/dressing independently. Short Term Goal 2: demo bending/reaching at waist while standing during ADL tasks at SUP, without use of LRD. Short Term Goal 3: demo good safety awarenss during func mob with ADL tasks independently, without use of LRD.        Therapy Time   Individual Concurrent Group Co-treatment   Time In 0831         Time Out 0637         Minutes 24         Timed Code Treatment Minutes: 10 Minutes co-jayesh w/ PT       Leticia Bravo S/OT

## 2022-08-28 NOTE — PROGRESS NOTES
[facility]   Progress Note                   Date:   8/28/2022  Patient name: Lashay Riggs  Date of admission:  8/22/2022  8:22 PM  MRN:   [id number]  YOB: 1935  PCP: Mathew Gilliland MD    Reason for Admission: Atrial fibrillation with RVR ([address] [I48.91]    Subjective:       Clinical Changes / Abnormalities:   Patient seen and examined. Denies chest pain or shortness of breath. Up to the bathroom complaining of dizziness again  Tele/vitals/labs reviewed . Medications:   Scheduled Meds:   amiodarone  200 mg Oral Daily    pantoprazole  40 mg Oral BID AC    sodium chloride flush  5-40 mL IntraVENous 2 times per day    [Held by provider] ranolazine  500 mg Oral BID    rivaroxaban  20 mg Oral Daily    clopidogrel  75 mg Oral Daily    amLODIPine  5 mg Oral Daily    atorvastatin  80 mg Oral Nightly    insulin glargine  15 Units SubCUTAneous Nightly    metoprolol succinate  12.5 mg Oral Daily    sodium chloride flush  5-40 mL IntraVENous 2 times per day    insulin lispro  0-8 Units SubCUTAneous TID WC    insulin lispro  0-4 Units SubCUTAneous Nightly     Continuous Infusions:   sodium chloride      sodium chloride      dextrose       CBC:   Recent Labs     08/26/22  0710 08/27/22  0711 08/28/22  0551   WBC 7.6 9.1 6.5   HGB 10.8* 10.7* 10.1*    199 224       BMP:    Recent Labs     08/26/22  1443 08/27/22  0711 08/28/22  0551    140 139   K 4.4 4.0 4.2    108* 109*   CO2 22 22 21   BUN 18 19 19   CREATININE 1.29* 1.50* 1.46*   GLUCOSE 173* 72 67*       Hepatic:   No results for input(s): AST, ALT, ALB, BILITOT, ALKPHOS in the last 72 hours. Troponin:   No results for input(s): TROPHS in the last 72 hours. BNP: No results for input(s): BNP in the last 72 hours. Lipids: No results for input(s): CHOL, HDL in the last 72 hours. Invalid input(s): LDLCALCU  INR:   No results for input(s): INR in the last 72 hours.   EKG:    Date: 08/23/22  Reading: No acute ischemia  NSR with T wave [id number] in lateral leads     LAST ECHO:  Date: 01/26/2018  Findings Summary:  Left ventricle is normal in size. Global left ventricular systolic function  is low normal. Estimated ejection fraction is 50 % . Normal right ventricular size and function. No significant valvular regurgitation or stenosis seen. LAST Stress Test:   Date of last ST:  Major Findings:     LAST Cardiac Angiography:.  Date:  Findings:  Cardiac Arteries and Lesion Findings     LMCA: 20-30% distal lesion     LAD: 40% mid lesion     LCx: Mild irregularities 10-20%. 70% proximal OM 1 lesion and 99% OM 2  lesion, both <2.0 caliber vessels     RCA: 95% proximal lesion, patent mid stent and patent ostial PDA stent, 70%  PDA lesion and 99% RPL lesion <2.0 mm vessel       Lesion on Prox RCA: Proximal subsection. 95% stenosis 60 mm length reduced    to 0%. Pre procedure MARK III flow was noted. Post Procedure MARK III    flow was present. Good runoff was present. The lesion was diagnosed as    High Risk (C). Devices used       - OneRoof Flex 180 cm. Number of passes: 1.       - Trek Balloon 2.5mm x 12mm. 1 inflation(s) to a max pressure of: 16    antonia. - 6 fr GuideLiner. Number of passes: 1.       - Xience Alpine 3.0 x 38 RAZA. 1 inflation(s) to a max pressure of: 16    antonia. - Xience Alpine 3.0 x 18 RAZA. 1 inflation(s) to a max pressure of: 18    antonia. - NC Quantum Sabine Balloon 3.5x15. 1 inflation(s) to a max pressure of:    14 antonia. - NC Quantum Sabine Balloon 3.5x15. 1 inflation(s) to a max pressure of:    18 antonia. - NC Quantum Sabine Balloon 3.5x15. 1 inflation(s) to a max pressure of:    18 antonia. - NC Quantum Sabine Balloon 3.5x15. 1 inflation(s) to a max pressure of:    18 antonia. Lesion on R PDA: Mid subsection. 70% stenosis 10 mm length reduced to 0%. Pre procedure MAKR III flow was noted. Post Procedure MARK III flow was    present. Good runoff was present.  The lesion was diagnosed as Low Risk    (A). Devices used       - Xience Alpine 2.5 x 12 RAZA. 1 inflation(s) to a max pressure of: 14    antonia. Coronary Tree      Dominance: Right     LV Analysis  LV function assessed as:Normal.  Ejection Fraction  +----------------------------------------+---------------------------------+  ! Method                                  ! EF%                              ! +----------------------------------------+---------------------------------+  ! Estimated                               ! 50                               !  +----------------------------------------+---------------------------------+         Cardiac Arteries and Lesion Findings       LMCA: Mild irregularities 10-20%. LAD: Mid diffuse 50% stenosis same as on last angiogram.   D1 is intermediate with mid diffuse 60% stenosis   D2 is small and patent     LCx: Mild irregularities 20-30%. OM1 is small and patent   OM2 is small with ostial 100% occlusion and left to left collaterals     RCA: Proximal 100% chronic in-stent restenosis with good left to right   collaterals      Coronary Tree        Dominance: Right      Procedure Summary        1. Chronic total occlusion of RCA with left to right Collaterals    2. Moderate Nonobstructive disease in LAD/D1 same as on last angiogram    3. Normal LVEDP. LV gram not done due to renal insufficiency        Recommendations        Routine Post Diagnostic Cath orders. Optimize medical therapy for CAD    Resume anticoagulation    Risk factor modification.       Estimated Blood Loss:            [x] Minimal 10 cc   [] Minimal < 25 cc      [] Moderate 25-50 cc         [] >50 cc        Electronically signed by Faina Cuevas MD on 8/26/2022 at 10:17 AM  Cardiovascular fellow  [facility]     Denger ziggy Reddy MD, [address], [place]                   Objective:   Vitals: BP (!) 127/58   Pulse 54   Temp 98.4 °F (36.9 °C) (Oral)   Resp 18   Ht 5' 10\" (1.778 m)   Wt 178 lb 9.2 oz (81 kg)   SpO2 98%   BMI 25.62 kg/m²   General appearance: alert and cooperative with exam  HEENT: Head: Normocephalic, no lesions, without obvious abnormality. Neck: no JVD, trachea midline, no adenopathy  Lungs: Clear to auscultation  Heart: Regular rate and rhythm, s1/s2 auscultated, no murmurs  Abdomen: soft, non-tender, bowel sounds active  Extremities: no edema  Neurologic: not done        Assessment / Acute Cardiac Problems:   NSTEMI, troponins are 13 and 28, St depression in V4-V6 and AVL. A. 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)     Essential hypertension     CAD (coronary artery disease)     Bradycardia     Demand ischemia (HCC)     Unstable angina (HCC)     Anxiety     Chronic rhinitis     Diabetic retinopathy (HCC)     Dizziness     Hyperlipidemia     Insomnia     Lumbar spondylolysis     Stroke syndrome     AK (actinic keratosis)     Chronic sinusitis     Anemia     Arthritis     Paroxysmal atrial fibrillation (HCC)     S/P skin biopsy     Squamous cell carcinoma in situ     Atrial fibrillation with RVR (HCC)     Lactic acid acidosis     CKD (chronic kidney disease) stage 3, GFR 30-59 ml/min ([facility])      Plan of Treatment:       Status post cardiac catheterization with no intervention as above  Afib. Currently sinus dar -    continue amio and  BB with parameter and Xarelto. Dizziness.   Discussed case with EP Dr. Liv Greer who recommended Tilt table on Monday,  compression sock on  Keep K>4, mg>2          Electronically signed by SANAM Kapadia - CONTRERAS on 8/28/2022 at [facility].  [phone]

## 2022-08-28 NOTE — PROGRESS NOTES
Physical Therapy  Facility/Department: [facility] CAR 2- STEPDOWN  Physical Therapy Initial Assessment    Name: Zane Castro  : 1935  MRN: [id number]  Date of Service: 2022  Chief Complaint   Patient presents with    Chest Pain      Discharge Recommendations:  Patient would benefit from continued therapy after discharge   PT Equipment Recommendations  Equipment Needed: No      Patient Diagnosis(es): The primary encounter diagnosis was Atrial fibrillation with RVR ([facility] [address] A diagnosis of Stage 3b chronic kidney disease ([facility] [address] was also pertinent to this visit. Past Medical History:  has a past medical history of Acute myocardial ischemia ([facility] [address] Acute sinusitis, Cerebral artery occlusion with cerebral infarction [facility]), Chest pain, Diabetes mellitus ([facility] [address] H/O cataract, Hyperlipidemia, Hypertension, MI (myocardial infarction) ([facility] [address] Neck stiffness, Partial small bowel obstruction ([facility] [address] Peyronie's disease, Rotator cuff tendonitis, and TIA (transient ischemic attack). Past Surgical History:  has a past surgical history that includes Cardiac surgery (2018); Cardiac catheterization; Cataract removal with implant (Left); and skin biopsy. Assessment   Body Structures, Functions, Activity Limitations Requiring Skilled Therapeutic Intervention: Decreased functional mobility ; Decreased strength;Decreased endurance;Decreased balance  Assessment: Pt performed well during therapy this date. Pt was able to ambulate 300 feet with no device and SBA. Pt most limited this date secondary to slightly impaired endurance, slightly impaired standing balance. Pt would benefit from additional therapy upon discharge. Pt would benefit from assistance with mobility upon discharge.   Therapy Prognosis: Good  Decision Making: Low Complexity  Requires PT Follow-Up: Yes  Activity Tolerance  Activity Tolerance: Patient tolerated treatment well     Plan   Plan  Plan: 3-5 times per week  Current Treatment Recommendations: Strengthening, Balance training, Functional mobility training, Transfer training, Gait training, Stair training, Endurance training, Equipment evaluation, education, & procurement, Therapeutic activities, Home exercise program, Safety education & training, Patient/Caregiver education & training  Safety Devices  Type of Devices: Call light within reach, Gait belt, Left in chair, Nurse notified  Restraints  Restraints Initially in Place: No     Restrictions  Restrictions/Precautions  Restrictions/Precautions: Fall Risk, General Precautions  Required Braces or Orthoses?: No  Position Activity Restriction  Other position/activity restrictions: up w/A     Subjective   General  Patient assessed for rehabilitation services?: Yes  Response To Previous Treatment: Not applicable  Family / Caregiver Present: No  Follows Commands: Within Functional Limits  Subjective  Subjective: Pt supine in bed and agreeable to therapy, RN agreeable to therapy. Pt pleasant and cooperative throughout today's session. Social/Functional History  Social/Functional History  Lives With: Spouse  Type of Home: House  Home Layout: One level  Home Access: Stairs to enter without rails  Entrance Stairs - Number of Steps: 2  Entrance Stairs - Rails: None  Bathroom Shower/Tub: Walk-in shower  Bathroom Toilet: Handicap height  Bathroom Equipment:  (reports none)  Home Equipment:  (no DME)  ADL Assistance: Independent  Homemaking Assistance: Independent  Homemaking Responsibilities:  (wife performs majority of cooking, pt performs cleaning, vacuuming.)  Ambulation Assistance: Independent  Transfer Assistance: Independent  Active : Yes  Mode of Transportation: Car  Occupation: Retired  Type of Occupation:   Leisure & Hobbies: yardwork, mowing  Additional Comments: Pt reports that his wife is retired and able to assist as needed.   Vision/Hearing  Vision  Vision: Impaired  Vision Exceptions: Wears glasses for distance  Hearing  Hearing: Exceptions to [facility]  Hearing Exceptions: Hard of hearing/hearing concerns (R ear worse)    Cognition   Cognition  Overall Cognitive Status: WFL     Objective           Gross Assessment  Sensation: Impaired (pt denies any numbness/tingling)     AROM RLE (degrees)  RLE AROM: WFL  AROM LLE (degrees)  LLE AROM : WFL  AROM RUE (degrees)  RUE AROM : WFL  AROM LUE (degrees)  LUE AROM : WFL  Strength RLE  Strength RLE: WFL  Comment: Grossly 4+/5  Strength LLE  Strength LLE: WFL  Comment: Grossly 4+/5  Strength RUE  Comment: Co-eval with OT, see OT note for UE detail. Strength LUE  Comment: Co-eval with OT, see OT note for UE detail. Bed Mobility Training  Bed Mobility Training: Yes  Supine to Sit: Supervision  Scooting: Supervision  Balance  Sitting: Intact (Pt sat unsupported EOB at SUP ~8 mins.)  Standing: Intact (Pt stood without use of RW at A. Pt requiring no rest breaks.)  Transfer Training  Transfer Training: Yes  Sit to Stand: Stand-by assistance  Stand to Sit: Stand-by assistance  Gait  Overall Level of Assistance: Stand-by assistance (Pt demo'd func mob around room/unit at A without use of LRD. Pt required no rest breaks.)  Bed mobility  Supine to Sit: Supervision  Sit to Supine:  (pt retired up to a chair at the conclusion of today's session.)  Scooting: Stand by assistance  Bed Mobility Comments: HOB elevated ~30 degrees  Transfers  Sit to Stand: Stand by assistance  Stand to sit: Stand by assistance  Ambulation  Surface: level tile  Device: No Device  Assistance: Stand by assistance  Quality of Gait: fair stability, slightly increased trunk sway, no LOB. Gait Deviations: Slow Mervat  Distance: 300 feet  More Ambulation?: No  Stairs/Curb  Stairs?: No     Balance  Posture: Fair  Sitting - Static: Good  Sitting - Dynamic: Good  Standing - Static: Good;-  Standing - Dynamic: Fair;+  Comments: standing balance assessed while using no device. AM-PAC Score  AM-PAC Inpatient Mobility Raw Score : 22 (08/28/22 1401)  AM-PAC Inpatient T-Scale Score : 53.28 (08/28/22 1401)  Mobility Inpatient CMS 0-100% Score: 20.91 (08/28/22 1401)  Mobility Inpatient CMS G-Code Modifier : CJ (08/28/22 1401)            Goals  Short Term Goals  Time Frame for Short term goals: 14 visits  Short term goal 1: Pt will ambulate 300 feet with no device independently  Short term goal 2: Pt will demonstrate good- standing balance to decrease fall risk. Short term goal 3: Pt will perform sit<>stand transfer independently. Short term goal 4: Pt will negotiate 2 stairs with no handrails and SBA to allow the pt to enter prior living arrangements. Additional Goals?: No       Education  Patient Education  Education Given To: Patient  Education Provided: Role of Therapy;Transfer Training;Plan of Care; Fall Prevention Strategies  Education Method: Verbal  Barriers to Learning: None  Education Outcome: Verbalized understanding      Therapy Time   Individual Concurrent Group Co-treatment   Time In 0831         Time Out 0856         Minutes 25         Timed Code Treatment Minutes: 8 Minutes       Edrick Bence, PT

## 2022-08-28 NOTE — PROGRESS NOTES
[facility]  Office: [address], DO, Nery Kirk, DO, Odell Guerin, DO, Bill Smita Porter, DO, aLila Montgomery MD, Austin Velarde MD, Ramana Sanders MD, Rohith Weiss MD,  Ale Wade MD, Elaine Rodriguez MD, Neha Rubin DO, Avelino Campuzano MD,  Guillermo Davidson MD, Tarun Diana MD, Chaya Farris DO, Sarah Mansfield MD, Alma Delia Rowley MD, Brenda Brandon MD, Florence Burris DO, Marge Lizama MD, Kiara Brewer MD, Franca Rothman, CNP,  Sandrita Anand, CNP, David Crouch, CNP, Reid Hartley, CNP, Sofiya Wei PAElvaC, Alma Cartagena, DNP, Andrea Gil, CNP, Ian Baron, CNP, Lynnette Sun, CNP, Sary Dangelo, CNP, Rafi Albarran, CNP, Jose Nurse, CNS, Annie Garcias, [facility], Adela Diaz, CNP, Areli Gandara, CNP, Bipin Miles, CNP           [address]    Progress Note    8/28/2022    1:13 PM    Name:   Vikram Roth  MRN:     [id number]     [place]:      [de-identified]   Room:   2001/2001-01  IP Day:  6  Admit Date:  8/22/2022  8:22 PM    PCP:   Eran Matamoros MD  Code Status:  Full Code    Subjective:     C/C:   Chief Complaint   Patient presents with    Chest Pain     Interval History Status: not changed. Had an episode of shaking this afternoon, discussed staying overnight for tilt table test in the morning. Patient otherwise with no new symptoms, vitals have been stable    Brief History:     From H&P  Vikram Roth is a 80 y.o.  male with history of coronary disease status post prior MI in 2018, prior stroke, type 2 diabetes, CKD 3 who presented with acute symptomatic tachycardia with chest pain with Chest Pain   and is admitted to the hospital for the management of Atrial fibrillation with RVR ([facility] [address] Patient with prior episode of chest pain 2 to 3 weeks ago status post evaluation at [facility].   Was seen in follow-up with [facility] cardiology earlier in August. Recommended for stress test which was scheduled for September. Patient describes abrupt onset of chest pain earlier this evening after standing up in the kitchen after eating. Symptoms persisted for 2-1/2 hours constant. Currently resolved since arrival to ED status post heparin/amio drip. Pain was initially Sharp aching quality midsternum with radiation to the left shoulder. Denied any prior episode of chest pain shortness of breath when he was doing yard work earlier that morning. Was out in the yard for approximately 30 minutes this morning. Did denies any heavy lifting. With chest pain episode patient did become acutely diaphoretic with nausea with mild pleurisy. + Tachycardia.  + Lightheadedness but denies near syncope or collapse. Denies prior DVT PE or recent travel. Status post heparin/amio drip in ED. In ED patient found to have heart rates in the 130s to 150s with A. fib status post loading with amiodarone drip. Chest pain has resolved since controlling tachycardia. Denies any prior issues with tachycardia, palpitations. Denies fevers or chills or flulike symptoms. Denies positional changes. Review of Systems:     Constitutional:  negative for chills, fevers, sweats  Respiratory:  negative for cough, dyspnea on exertion, shortness of breath, wheezing  Cardiovascular:  negative for chest pain, chest pressure/discomfort, lower extremity edema, palpitations  Gastrointestinal:  negative for abdominal pain, constipation, diarrhea, nausea, vomiting  Neurological:  negative for dizziness, headache    Medications: Allergies:     Allergies   Allergen Reactions    Sulfa Antibiotics Anaphylaxis       Current Meds:   Scheduled Meds:    amiodarone  200 mg Oral Daily    pantoprazole  40 mg Oral BID AC    sodium chloride flush  5-40 mL IntraVENous 2 times per day    [Held by provider] ranolazine  500 mg Oral BID    rivaroxaban  20 mg Oral Daily    clopidogrel  75 mg Oral Daily    amLODIPine 5 mg Oral Daily    atorvastatin  80 mg Oral Nightly    insulin glargine  15 Units SubCUTAneous Nightly    metoprolol succinate  12.5 mg Oral Daily    sodium chloride flush  5-40 mL IntraVENous 2 times per day    insulin lispro  0-8 Units SubCUTAneous TID WC    insulin lispro  0-4 Units SubCUTAneous Nightly     Continuous Infusions:    sodium chloride      sodium chloride      dextrose       PRN Meds: aluminum & magnesium hydroxide-simethicone, sodium chloride flush, sodium chloride, acetaminophen, sodium chloride flush, sodium chloride flush, sodium chloride flush, sodium chloride, ondansetron **OR** ondansetron, polyethylene glycol, acetaminophen **OR** acetaminophen, glucose, dextrose bolus **OR** dextrose bolus, glucagon (rDNA), dextrose    Data:     Past Medical History:   has a past medical history of Acute myocardial ischemia (HCC), Acute sinusitis, Cerebral artery occlusion with cerebral infarction [facility]), Chest pain, Diabetes mellitus ([facility] [address] H/O cataract, Hyperlipidemia, Hypertension, MI (myocardial infarction) ([facility] [address] Neck stiffness, Partial small bowel obstruction ([facility] [address] Peyronie's disease, Rotator cuff tendonitis, and TIA (transient ischemic attack). Social History:   reports that he has quit smoking. He has a 4.00 pack-year smoking history. He has never used smokeless tobacco. He reports that he does not drink alcohol and does not use drugs. Family History:   Family History   Problem Relation Age of Onset    Cancer Mother         oral    Stroke Father     High Blood Pressure Brother     Heart Disease Brother     Cancer Maternal Grandmother     Diabetes type 2  Son     Heart Attack Son     Heart Disease Maternal Grandfather     Stroke Paternal Grandmother     Heart Disease Paternal Grandfather         ?        Vitals:  BP (!) 127/58   Pulse 54   Temp 98.4 °F (36.9 °C) (Oral)   Resp 18   Ht 5' 10\" (1.778 m)   Wt 178 lb 9.2 oz (81 kg)   SpO2 98%   BMI 25.62 kg/m²   Temp (24hrs), Av.3 °F (36.8 normal affect  Lungs:  clear to auscultation bilaterally, normal effort  Heart: Irregularly irregular rate rhythm no murmurs rubs gallops  Abdomen:  soft, nontender, nondistended, normal bowel sounds, no masses, hepatomegaly, splenomegaly  Extremities:  no edema, redness, tenderness in the calves  Skin:  no gross lesions, rashes, induration    Assessment:        Hospital Problems             Last Modified POA    * (Principal) Atrial fibrillation with RVR ([address] 8/23/2022 Yes    NSTEMI (non-ST elevated myocardial infarction) ([address] 8/27/2022 Yes    Lactic acid acidosis 8/23/2022 Yes    CKD (chronic kidney disease) stage 3, GFR 30-59 ml/min ([facility]) 8/23/2022 Yes    Orthostatic hypotension 8/27/2022 Yes    RENE (acute kidney injury) ([address] 8/27/2022 Yes    Controlled type 2 diabetes with neuropathy ([address] 8/23/2022 Yes    CAD (coronary artery disease) 8/23/2022 Yes    Bradycardia 8/27/2022 Yes    Demand ischemia ([address] 8/23/2022 Yes    Unstable angina ([address] 8/23/2022 Yes    Anemia 8/23/2022 Yes   Plan:        Atrial fibrillation with RVR-patient was on amiodarone and Toprol-XL, patient with heart rates of 40s to 50s with Toprol-XL held. Still having dizziness last few days due to orthostatic hypotension which seems to have improved this morning.   CAD  Type 2 diabetes  Plan:  Tilt table test tomorrow   Cardiology following      Corina Santos DO  8/28/2022  1:13 PM

## 2022-08-28 NOTE — CARE COORDINATION
Transitional Planning    Per Dr Lencho Christianson, Patient will not be discharged today.  Patient to have tilt table test

## 2022-08-29 ENCOUNTER — APPOINTMENT (OUTPATIENT)
Dept: CARDIAC CATH/INVASIVE PROCEDURES | Age: 87
DRG: 287 | End: 2022-08-29
Payer: MEDICARE

## 2022-08-29 VITALS
SYSTOLIC BLOOD PRESSURE: 131 MMHG | RESPIRATION RATE: 16 BRPM | WEIGHT: 180.78 LBS | OXYGEN SATURATION: 99 % | HEART RATE: 56 BPM | DIASTOLIC BLOOD PRESSURE: 58 MMHG | HEIGHT: 70 IN | BODY MASS INDEX: 25.88 KG/M2 | TEMPERATURE: 97.9 F

## 2022-08-29 LAB
ALBUMIN SERPL-MCNC: 3.2 G/DL (ref 3.5–5.2)
ANION GAP SERPL CALCULATED.3IONS-SCNC: 8 MMOL/L (ref 9–17)
BUN BLDV-MCNC: 17 MG/DL (ref 8–23)
CALCIUM SERPL-MCNC: 8.3 MG/DL (ref 8.6–10.4)
CHLORIDE BLD-SCNC: 105 MMOL/L (ref 98–107)
CO2: 23 MMOL/L (ref 20–31)
CREAT SERPL-MCNC: 1.43 MG/DL (ref 0.7–1.2)
EKG ATRIAL RATE: 48 BPM
EKG P AXIS: 20 DEGREES
EKG P-R INTERVAL: 186 MS
EKG Q-T INTERVAL: 502 MS
EKG QRS DURATION: 86 MS
EKG QTC CALCULATION (BAZETT): 448 MS
EKG R AXIS: -2 DEGREES
EKG T AXIS: 83 DEGREES
EKG VENTRICULAR RATE: 48 BPM
GFR AFRICAN AMERICAN: 57 ML/MIN
GFR NON-AFRICAN AMERICAN: 47 ML/MIN
GFR SERPL CREATININE-BSD FRML MDRD: ABNORMAL ML/MIN/{1.73_M2}
GLUCOSE BLD-MCNC: 102 MG/DL (ref 75–110)
GLUCOSE BLD-MCNC: 111 MG/DL (ref 75–110)
GLUCOSE BLD-MCNC: 97 MG/DL (ref 70–99)
HCT VFR BLD CALC: 29.3 % (ref 40.7–50.3)
HEMOGLOBIN: 10.2 G/DL (ref 13–17)
MAGNESIUM: 2 MG/DL (ref 1.6–2.6)
MCH RBC QN AUTO: 32.1 PG (ref 25.2–33.5)
MCHC RBC AUTO-ENTMCNC: 34.8 G/DL (ref 28.4–34.8)
MCV RBC AUTO: 92.1 FL (ref 82.6–102.9)
NRBC AUTOMATED: 0 PER 100 WBC
PDW BLD-RTO: 12.6 % (ref 11.8–14.4)
PHOSPHORUS: 3.2 MG/DL (ref 2.5–4.5)
PLATELET # BLD: 252 K/UL (ref 138–453)
PMV BLD AUTO: 11.7 FL (ref 8.1–13.5)
POTASSIUM SERPL-SCNC: 4 MMOL/L (ref 3.7–5.3)
RBC # BLD: 3.18 M/UL (ref 4.21–5.77)
SODIUM BLD-SCNC: 136 MMOL/L (ref 135–144)
WBC # BLD: 6.2 K/UL (ref 3.5–11.3)

## 2022-08-29 PROCEDURE — 93660 TILT TABLE EVALUATION: CPT

## 2022-08-29 PROCEDURE — 83735 ASSAY OF MAGNESIUM: CPT

## 2022-08-29 PROCEDURE — 80069 RENAL FUNCTION PANEL: CPT

## 2022-08-29 PROCEDURE — 82947 ASSAY GLUCOSE BLOOD QUANT: CPT

## 2022-08-29 PROCEDURE — 6370000000 HC RX 637 (ALT 250 FOR IP): Performed by: INTERNAL MEDICINE

## 2022-08-29 PROCEDURE — 99239 HOSP IP/OBS DSCHRG MGMT >30: CPT | Performed by: INTERNAL MEDICINE

## 2022-08-29 PROCEDURE — 6370000000 HC RX 637 (ALT 250 FOR IP): Performed by: STUDENT IN AN ORGANIZED HEALTH CARE EDUCATION/TRAINING PROGRAM

## 2022-08-29 PROCEDURE — 6370000000 HC RX 637 (ALT 250 FOR IP): Performed by: SURGERY

## 2022-08-29 PROCEDURE — 93010 ELECTROCARDIOGRAM REPORT: CPT | Performed by: INTERNAL MEDICINE

## 2022-08-29 PROCEDURE — 36415 COLL VENOUS BLD VENIPUNCTURE: CPT

## 2022-08-29 PROCEDURE — 85027 COMPLETE CBC AUTOMATED: CPT

## 2022-08-29 RX ORDER — FLUDROCORTISONE ACETATE 0.1 MG/1
0.1 TABLET ORAL DAILY
Status: DISCONTINUED | OUTPATIENT
Start: 2022-08-29 | End: 2022-08-29 | Stop reason: HOSPADM

## 2022-08-29 RX ORDER — FLUDROCORTISONE ACETATE 0.1 MG/1
0.1 TABLET ORAL DAILY
Qty: 30 TABLET | Refills: 0 | Status: SHIPPED | OUTPATIENT
Start: 2022-08-29 | End: 2022-09-01 | Stop reason: SDUPTHER

## 2022-08-29 RX ADMIN — PANTOPRAZOLE SODIUM 40 MG: 40 TABLET, DELAYED RELEASE ORAL at 08:10

## 2022-08-29 RX ADMIN — CLOPIDOGREL 75 MG: 75 TABLET, FILM COATED ORAL at 08:10

## 2022-08-29 RX ADMIN — FLUDROCORTISONE ACETATE 0.1 MG: 0.1 TABLET ORAL at 12:05

## 2022-08-29 RX ADMIN — AMLODIPINE BESYLATE 5 MG: 5 TABLET ORAL at 08:10

## 2022-08-29 RX ADMIN — AMIODARONE HYDROCHLORIDE 200 MG: 200 TABLET ORAL at 08:10

## 2022-08-29 ASSESSMENT — PAIN SCALES - GENERAL
PAINLEVEL_OUTOF10: 0
PAINLEVEL_OUTOF10: 0

## 2022-08-29 NOTE — PROGRESS NOTES
IV removed, tele off, pt dressed, reviewed AVS with pt, wife and daughter, script given for labs, all questions answered, pt taken per WC to parking garage level 5 and assisted into car.

## 2022-08-29 NOTE — PROGRESS NOTES
CLINICAL PHARMACY NOTE: MEDS TO BEDS    Total # of Prescriptions Filled: 2   The following medications were delivered to the patient:  Pantoprazole 40  Fludrocortisone 0.1     Additional Documentation:

## 2022-08-29 NOTE — DISCHARGE SUMMARY
[facility]  Office: [address], DO, Edgar Thomas, DO, Nirav Birmingham, DO, Tavo Porter, DO, Analia Myles MD, Eulalia Bryan MD, Erich Mansfield MD, Eliseo Moreno MD,  Jered Villaseñor MD, Jeannette Bradley MD, Chet Jj DO, Yusuf Vidal MD,  Caitlyn Chan MD, Ric High MD, Joy Quezada DO, Mary Kate Franco MD, Donna Busby MD, Christopher Lopez MD, Nicolas Landry DO, Scarlet Mansfield MD, Michael Smith MD, John Mercado, CNP,  Virginia Brain, CNP, Yarelis Mendez, CNP, Carter Earl, CNP, Iveth Bowie PA-C, Lisa Garcia, DNP, Dodie Tobar, CNP, Lizz Mario, CNP, Pedro Balderas, CNP, Lamar Yarbrough, [facility], Hina Christensen, CNP, Jinny Bui, CNS, Edelmira Medeiros, [facility], Yareli Weinstein, CNP, Hui Counter, CNP, Melonie Canmary, [address] [place] [address]    Discharge Summary     Patient ID: Ashleigh Justin  :  1935   MRN: [id number]     ACCOUNT:  [de-identified]   Patient's PCP: Sacha Connors MD  Admit Date: 2022   Discharge Date: 2022     Length of Stay: 7  Code Status:  Full Code  Admitting Physician: No admitting provider for patient encounter. 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  *      Admission Condition:  good     Discharged Condition: good    Hospital Stay:     Hospital Course: From H&P  Ashleigh Edu is a 80 y.o.  male with history of coronary disease status post prior MI in 2018, prior stroke, type 2 diabetes, CKD 3 who presented with acute symptomatic tachycardia with chest pain with Chest Pain   and is admitted to the hospital for the management of Atrial fibrillation with RVR ([address] Patient with prior episode of chest pain 2 to 3 weeks ago status post evaluation at [facility]. Was seen in follow-up with [address] [place] [address] cardiology earlier in August.  Recommended for stress test which was scheduled for September. Patient describes abrupt onset of chest pain earlier this evening after standing up in the kitchen after eating. Symptoms persisted for 2-1/2 hours constant. Currently resolved since arrival to ED status post heparin/amio drip. Pain was initially Sharp aching quality midsternum with radiation to the left shoulder. Denied any prior episode of chest pain shortness of breath when he was doing yard work earlier that morning. Was out in the yard for approximately 30 minutes this morning. Did denies any heavy lifting. With chest pain episode patient did become acutely diaphoretic with nausea with mild pleurisy. + Tachycardia.  + Lightheadedness but denies near syncope or collapse. Denies prior DVT PE or recent travel. Status post heparin/amio drip in ED. In ED patient found to have heart rates in the 130s to 150s with A. fib status post loading with amiodarone drip. Chest pain has resolved since controlling tachycardia. Denies any prior issues with tachycardia, palpitations. Denies fevers or chills or flulike symptoms. Denies positional changes. Started on Toprol-XL and amiodarone. Heart rate improved to 50s to 60s but patient continued to have multiple episodes of lightheadedness. Initially Toprol-XL was discontinue and orthostatic vitals continue to be positive. Patient was given IV fluids eventually compression stockings were ordered and placed on the patient.   Patient stayed over the weekend due to continued multiple episodes of feeling unwell while ambulating. Tilt table test was eventually found to be positive for orthostatic hypotension, he was started on Florinef. Patient also having heartburn persistently with epigastric pain radiating up to throat. Patient was started on twice daily PPI and told to follow-up with PCP outpatient for further care    Outpatient follow-up:  Primary care physician-May require work-up for GERD and endoscopy. Consider weaning off PPI if symptoms improve, if they persist consider GI evaluation with H. pylori testing. Patient started on Florinef, continue to educate on compression stockings and orthostatic hypotension. Dermatology-patient has a spot that started bleeding on his back last week, suspicious appearance on initial evaluation although chronology has not been determined. Patient was unaware until blood was found on his bed in the hospital.  Patient has an appointment with dermatology next week  Cardiology-patient was discharged on amiodarone, did not tolerate Toprol-XL although can possibly be restarted now that he is on Florinef. He was encouraged hydration and compression stockings.         Significant therapeutic interventions: none    Significant Diagnostic Studies:   Labs / Micro:  CBC:   Lab Results   Component Value Date/Time    WBC 6.2 08/29/2022 06:25 AM    RBC 3.18 08/29/2022 06:25 AM    HGB 10.2 08/29/2022 06:25 AM    HCT 29.3 08/29/2022 06:25 AM    MCV 92.1 08/29/2022 06:25 AM    MCH 32.1 08/29/2022 06:25 AM    MCHC 34.8 08/29/2022 06:25 AM    RDW 12.6 08/29/2022 06:25 AM     08/29/2022 06:25 AM     BMP:    Lab Results   Component Value Date/Time    GLUCOSE 97 08/29/2022 06:25 AM     08/29/2022 06:25 AM    K 4.0 08/29/2022 06:25 AM     08/29/2022 06:25 AM    CO2 23 08/29/2022 06:25 AM    ANIONGAP 8 08/29/2022 06:25 AM    BUN 17 08/29/2022 06:25 AM    CREATININE 1.43 08/29/2022 06:25 AM    BUNCRER NOT REPORTED 03/08/2022 09:38 AM    CALCIUM 8.3 08/29/2022 06:25 AM    LABGLOM 47 08/29/2022 06:25 AM    GFRAA 57 08/29/2022 06:25 AM    GFR      08/29/2022 06:25 AM     HFP:    Lab Results   Component Value Date/Time    PROT 6.2 08/25/2022 04:48 PM     CMP:    Lab Results   Component Value Date/Time    GLUCOSE 97 08/29/2022 06:25 AM     08/29/2022 06:25 AM    K 4.0 08/29/2022 06:25 AM     08/29/2022 06:25 AM    CO2 23 08/29/2022 06:25 AM    BUN 17 08/29/2022 06:25 AM    CREATININE 1.43 08/29/2022 06:25 AM    ANIONGAP 8 08/29/2022 06:25 AM    ALKPHOS 83 08/23/2022 05:47 AM    ALT 12 08/23/2022 05:47 AM    AST 22 08/23/2022 05:47 AM    BILITOT 0.26 08/23/2022 05:47 AM    LABALBU 3.2 08/29/2022 06:25 AM    ALBUMIN 1.5 08/23/2022 05:47 AM    LABGLOM 47 08/29/2022 06:25 AM    GFRAA 57 08/29/2022 06:25 AM    GFR      08/29/2022 06:25 AM    PROT 6.2 08/25/2022 04:48 PM    CALCIUM 8.3 08/29/2022 06:25 AM     PT/INR:    Lab Results   Component Value Date/Time    PROTIME 11.4 08/23/2022 05:47 AM    INR 1.1 08/23/2022 05:47 AM     PTT:   Lab Results   Component Value Date/Time    APTT 27.5 08/25/2022 04:48 PM     FLP:    Lab Results   Component Value Date/Time    CHOL 117 02/08/2021 12:00 AM    TRIG 115 02/08/2021 12:00 AM    HDL 43 02/08/2021 12:00 AM     U/A:    Lab Results   Component Value Date/Time    COLORU YELLOW 10/04/2015 07:55 PM    TURBIDITY CLOUDY 10/04/2015 07:55 PM    SPECGRAV 1.019 10/04/2015 07:55 PM    HGBUR NEGATIVE 10/04/2015 07:55 PM    PHUR 5.0 10/04/2015 07:55 PM    PROTEINU NEGATIVE 10/04/2015 07:55 PM    GLUCOSEU TRACE 10/04/2015 07:55 PM    KETUA NEGATIVE 10/04/2015 07:55 PM    BILIRUBINUR NEGATIVE 10/04/2015 07:55 PM    UROBILINOGEN Normal 10/04/2015 07:55 PM    NITRU NEGATIVE 10/04/2015 07:55 PM    LEUKOCYTESUR NEGATIVE 10/04/2015 07:55 PM     TSH:    Lab Results   Component Value Date/Time    TSH 2.99 10/04/2015 08:44 AM        Radiology:  XR CHEST (SINGLE VIEW FRONTAL)    Result Date: 8/24/2022  Stable appearance of the chest with no acute findings. US RENAL COMPLETE    Result Date: 8/25/2022  Unremarkable ultrasound of the kidneys and urinary bladder. XR CHEST PORTABLE    Result Date: 8/22/2022  Clear lungs. NM Cardiac Stress Test Nuclear Imaging    Result Date: 8/24/2022  1. Findings above consistent with reversible ischemia of the lateral wall. 2. Infarct of the inferior wall. 3. Left ventricular ejection fraction of 51%. 4.  Please see report for EKG portion of the examination which will be performed separately by physician from cardiology. Risk stratification:  High risk Note:  Risk stratification incorporates both clinical history and test results. Final risk determination is the responsibility of the ordering provider as history and other test results may increase or decrease the risk stratification reported for this examination. Risk stratification criteria are adapted from \"Noninvasive Risk Stratification\" criteria from Sonya Prater. Al, ACC/AATS/AHA/ASE/ASNC/SCAI/SCCT/STS 2017 Appropriate Use Criteria For Coronary Revascularization in Patients With Stable Ischemic Heart Disease [facility] Volume 69, Issue 17, May 2017 High risk (>3% annual death or MI) 1. Severe resting LV dysfunction (LVEF >35%) not readily explained by non coronary causes 2. Resting perfusion abnormalities greater than 10% of the myocardium in patients without prior history or evidence of MI 3. Stress-induced perfusion abnormalities encumbering greater than or equal to 10% myocardium or stress segmental scores indicating multiple vascular territories with abnormalities 4. Stress-induced LV dilatation (TID ratio greater than 1.19 for exercise and greater than 1.39 for regadenoson) Intermediate risk (1% to 3% annual death or MI) 1. Mild/moderate resting LV dysfunction (LVEF 35% to 49%) not readily explained by non coronary causes.  2.  Resting perfusion abnormalities in 5%-9.9% of the myocardium in patients tablet by mouth daily     fludrocortisone 0.1 MG tablet  Commonly known as: FLORINEF  Take 1 tablet by mouth daily     pantoprazole 40 MG tablet  Commonly known as: PROTONIX  Take 1 tablet by mouth 2 times daily (before meals)            CONTINUE taking these medications      Accu-Chek FastClix Lancets Mis  USE ONE LANCET TO TEST TWICE A DAY AND AS NEEDED     acetaminophen 500 MG tablet  Commonly known as: TYLENOL     amLODIPine 5 MG tablet  Commonly known as: NORVASC  TAKE 1 TABLET DAILY     atorvastatin 80 MG tablet  Commonly known as: LIPITOR  TAKE 1 TABLET NIGHTLY     clopidogrel 75 MG tablet  Commonly known as: PLAVIX  TAKE 1 TABLET DAILY     Co Q-10 30 MG Caps     ferrous sulfate 325 (65 Fe) MG tablet  Commonly known as: IRON 325     FreeStyle Freedom Lite w/Device Kit  1 kit by Does not apply route daily as needed (check sugars bid and prn)     FREESTYLE TEST STRIPS strip  Generic drug: blood glucose test strips  USE ONE STRIP TO TEST TWICE A DAY AND AS NEEDED     Lancet Device Misc  1 Device by Does not apply route once for 1 dose     Lantus SoloStar 100 UNIT/ML injection pen  Generic drug: insulin glargine  INJECT 15 UNITS INTO THE SKIN NIGHTLY     magnesium oxide 400 MG tablet  Commonly known as: MAG-OX  Take 1 tablet by mouth in the morning.      nitroGLYCERIN 0.4 MG SL tablet  Commonly known as: NITROSTAT  Place 1 tablet under the tongue every 5 minutes as needed for Chest pain     UltiCare Short Pen Needles 31G X 8 MM Misc  Generic drug: Insulin Pen Needle  INJECT 1 NEEDLE INTO THE SKIN DAILY     vitamin C 250 MG tablet     Xarelto 20 MG Tabs tablet  Generic drug: rivaroxaban  TAKE 1 TABLET DAILY            STOP taking these medications      lisinopril-hydroCHLOROthiazide 20-12.5 MG per tablet  Commonly known as: PRINZIDE;ZESTORETIC     metFORMIN 500 MG extended release tablet  Commonly known as: GLUCOPHAGE-XR     metoprolol succinate 25 MG extended release tablet  Commonly known as: TOPROL XL Where to Get Your Medications        These medications were sent to [facility] [address], [address]  [address] [facility] [address]      Phone: [phone]   amiodarone 200 MG tablet  fludrocortisone 0.1 MG tablet  pantoprazole 40 MG tablet         Discharge Procedure Orders   Basic Metabolic Panel   Standing Status: Standing Number of Occurrences: 4 Standing Exp. Date: 08/26/23   Order Comments: Fax to [phone]       Time Spent on discharge is  40 mins in patient examination, evaluation, counseling as well as medication reconciliation, prescriptions for required medications, discharge plan and follow up. Electronically signed by   Shanta Montoya DO  8/29/2022  10:51 AM      Thank you Dr. Conrado Benavides MD for the opportunity to be involved in this patient's care.

## 2022-08-29 NOTE — PLAN OF CARE
Problem: Chronic Conditions and Co-morbidities  Goal: Patient's chronic conditions and co-morbidity symptoms are monitored and maintained or improved  Outcome: Progressing
Problem: Discharge Planning  Goal: Discharge to home or other facility with appropriate resources  8/24/2022 1750 by Rosa Maria Hernandez RN  Outcome: Progressing  Flowsheets (Taken 8/24/2022 0800)  Discharge to home or other facility with appropriate resources: Identify barriers to discharge with patient and caregiver  8/24/2022 0433 by Ismael Myers  Outcome: Progressing     Problem: ABCDS Injury Assessment  Goal: Absence of physical injury  Outcome: Progressing     Problem: Pain  Goal: Verbalizes/displays adequate comfort level or baseline comfort level  Outcome: Progressing     Problem: Chronic Conditions and Co-morbidities  Goal: Patient's chronic conditions and co-morbidity symptoms are monitored and maintained or improved  Outcome: Progressing  Flowsheets (Taken 8/24/2022 0800)  Care Plan - Patient's Chronic Conditions and Co-Morbidity Symptoms are Monitored and Maintained or Improved: Monitor and assess patient's chronic conditions and comorbid symptoms for stability, deterioration, or improvement
Problem: Discharge Planning  Goal: Discharge to home or other facility with appropriate resources  8/25/2022 0029 by Florin Cortes RN  Outcome: Progressing  8/24/2022 1750 by Kati Russell RN  Outcome: Progressing  Flowsheets (Taken 8/24/2022 0800)  Discharge to home or other facility with appropriate resources: Identify barriers to discharge with patient and caregiver     Problem: ABCDS Injury Assessment  Goal: Absence of physical injury  8/25/2022 0029 by Florin Cortes RN  Outcome: Progressing  8/24/2022 1750 by Kati Russell RN  Outcome: Progressing     Problem: Pain  Goal: Verbalizes/displays adequate comfort level or baseline comfort level  8/25/2022 0029 by Florin Cortes RN  Outcome: Progressing  8/24/2022 1750 by Kati Russell RN  Outcome: Progressing     Problem: Chronic Conditions and Co-morbidities  Goal: Patient's chronic conditions and co-morbidity symptoms are monitored and maintained or improved  8/25/2022 0029 by Florin Cortes RN  Outcome: Progressing  8/24/2022 1750 by Kati Russell RN  Outcome: Progressing  Flowsheets (Taken 8/24/2022 0800)  Care Plan - Patient's Chronic Conditions and Co-Morbidity Symptoms are Monitored and Maintained or Improved: Monitor and assess patient's chronic conditions and comorbid symptoms for stability, deterioration, or improvement
Problem: Discharge Planning  Goal: Discharge to home or other facility with appropriate resources  8/25/2022 2217 by Ciarra Nascimento RN  Outcome: Progressing  8/25/2022 1748 by Juana Oviedo RN  Outcome: Progressing     Problem: ABCDS Injury Assessment  Goal: Absence of physical injury  8/25/2022 2217 by Ciarra Nascimento RN  Outcome: Progressing  8/25/2022 1748 by Juana Oviedo RN  Outcome: Progressing     Problem: Pain  Goal: Verbalizes/displays adequate comfort level or baseline comfort level  8/25/2022 2217 by Ciarra Nascimento RN  Outcome: Progressing  8/25/2022 1748 by Juana Oviedo RN  Outcome: Progressing     Problem: Chronic Conditions and Co-morbidities  Goal: Patient's chronic conditions and co-morbidity symptoms are monitored and maintained or improved  8/25/2022 2217 by Ciarra Nascimento RN  Outcome: Progressing  8/25/2022 1748 by Juana Oviedo RN  Outcome: Progressing
Problem: Discharge Planning  Goal: Discharge to home or other facility with appropriate resources  Outcome: Progressing
Problem: Discharge Planning  Goal: Discharge to home or other facility with appropriate resources  Outcome: Progressing     Problem: ABCDS Injury Assessment  Goal: Absence of physical injury  Outcome: Progressing     Problem: Pain  Goal: Verbalizes/displays adequate comfort level or baseline comfort level  Outcome: Progressing     Problem: Chronic Conditions and Co-morbidities  Goal: Patient's chronic conditions and co-morbidity symptoms are monitored and maintained or improved  Outcome: Progressing
Problem: Discharge Planning  Goal: Discharge to home or other facility with appropriate resources  Outcome: Progressing  Flowsheets (Taken 8/26/2022 2000)  Discharge to home or other facility with appropriate resources:   Identify barriers to discharge with patient and caregiver   Identify discharge learning needs (meds, wound care, etc)     Problem: ABCDS Injury Assessment  Goal: Absence of physical injury  Outcome: Progressing  Flowsheets (Taken 8/26/2022 2000)  Absence of Physical Injury: Implement safety measures based on patient assessment     Problem: Pain  Goal: Verbalizes/displays adequate comfort level or baseline comfort level  Outcome: Progressing     Problem: Chronic Conditions and Co-morbidities  Goal: Patient's chronic conditions and co-morbidity symptoms are monitored and maintained or improved  Outcome: Progressing  Flowsheets (Taken 8/26/2022 2000)  Care Plan - Patient's Chronic Conditions and Co-Morbidity Symptoms are Monitored and Maintained or Improved: Update acute care plan with appropriate goals if chronic or comorbid symptoms are exacerbated and prevent overall improvement and discharge     Problem: Safety - Adult  Goal: Free from fall injury  Outcome: Progressing
Problem: Discharge Planning  Goal: Discharge to home or other facility with appropriate resources  Outcome: Progressing  Flowsheets (Taken 8/27/2022 2000)  Discharge to home or other facility with appropriate resources: Identify barriers to discharge with patient and caregiver     Problem: ABCDS Injury Assessment  Goal: Absence of physical injury  Outcome: Progressing     Problem: Pain  Goal: Verbalizes/displays adequate comfort level or baseline comfort level  Outcome: Progressing     Problem: Chronic Conditions and Co-morbidities  Goal: Patient's chronic conditions and co-morbidity symptoms are monitored and maintained or improved  Outcome: Progressing  Flowsheets (Taken 8/27/2022 2000)  Care Plan - Patient's Chronic Conditions and Co-Morbidity Symptoms are Monitored and Maintained or Improved: Monitor and assess patient's chronic conditions and comorbid symptoms for stability, deterioration, or improvement     Problem: Safety - Adult  Goal: Free from fall injury  Outcome: Progressing
2229 by Billie Church, RN  Outcome: Progressing

## 2022-08-29 NOTE — CARE COORDINATION
Met with patient to discuss transitional planning. He is returning home with wife.  He denies needs and has transportation    Discharge [facility] Case Management Department  Written by: Lupillo Badillo RN    Patient Name: Zane Castro  Attending Provider: Aida Gabriel DO  Admit Date: 2022  8:22 PM  MRN: [id number]  Account: [de-identified]                     : 1935  Discharge Date: 2022      Disposition: home    Lupillo Badillo RN

## 2022-08-29 NOTE — PROGRESS NOTES
cardiology earlier in August.  Recommended for stress test which was scheduled for September. Patient describes abrupt onset of chest pain earlier this evening after standing up in the kitchen after eating. Symptoms persisted for 2-1/2 hours constant. Currently resolved since arrival to ED status post heparin/amio drip. Pain was initially Sharp aching quality midsternum with radiation to the left shoulder. Denied any prior episode of chest pain shortness of breath when he was doing yard work earlier that morning. Was out in the yard for approximately 30 minutes this morning. Did denies any heavy lifting. With chest pain episode patient did become acutely diaphoretic with nausea with mild pleurisy. + Tachycardia.  + Lightheadedness but denies near syncope or collapse. Denies prior DVT PE or recent travel. Status post heparin/amio drip in ED. In ED patient found to have heart rates in the 130s to 150s with A. fib status post loading with amiodarone drip. Chest pain has resolved since controlling tachycardia. Denies any prior issues with tachycardia, palpitations. Denies fevers or chills or flulike symptoms. Denies positional changes. Review of Systems:     Constitutional:  negative for chills, fevers, sweats  Respiratory:  negative for cough, dyspnea on exertion, shortness of breath, wheezing  Cardiovascular:  negative for chest pain, chest pressure/discomfort, lower extremity edema, palpitations  Gastrointestinal:  negative for abdominal pain, constipation, diarrhea, nausea, vomiting  Neurological:  negative for dizziness, headache    Medications: Allergies:     Allergies   Allergen Reactions    Sulfa Antibiotics Anaphylaxis       Current Meds:   Scheduled Meds:    fludrocortisone  0.1 mg Oral Daily    amiodarone  200 mg Oral Daily    pantoprazole  40 mg Oral BID AC    sodium chloride flush  5-40 mL IntraVENous 2 times per day    [Held by provider] ranolazine  500 mg Oral BID rivaroxaban  20 mg Oral Daily    clopidogrel  75 mg Oral Daily    amLODIPine  5 mg Oral Daily    atorvastatin  80 mg Oral Nightly    insulin glargine  15 Units SubCUTAneous Nightly    metoprolol succinate  12.5 mg Oral Daily    sodium chloride flush  5-40 mL IntraVENous 2 times per day    insulin lispro  0-8 Units SubCUTAneous TID WC    insulin lispro  0-4 Units SubCUTAneous Nightly     Continuous Infusions:    sodium chloride      sodium chloride      dextrose       PRN Meds: aluminum & magnesium hydroxide-simethicone, sodium chloride flush, sodium chloride, acetaminophen, sodium chloride flush, sodium chloride flush, sodium chloride flush, sodium chloride, ondansetron **OR** ondansetron, polyethylene glycol, acetaminophen **OR** acetaminophen, glucose, dextrose bolus **OR** dextrose bolus, glucagon (rDNA), dextrose    Data:     Past Medical History:   has a past medical history of Acute myocardial ischemia (HCC), Acute sinusitis, Cerebral artery occlusion with cerebral infarction [facility]), Chest pain, Diabetes mellitus ([facility] [facility] H/O cataract, Hyperlipidemia, Hypertension, MI (myocardial infarction) ([facility] [address] Neck stiffness, Partial small bowel obstruction ([facility] [address] Peyronie's disease, Rotator cuff tendonitis, and TIA (transient ischemic attack). Social History:   reports that he has quit smoking. He has a 4.00 pack-year smoking history. He has never used smokeless tobacco. He reports that he does not drink alcohol and does not use drugs. Family History:   Family History   Problem Relation Age of Onset    Cancer Mother         oral    Stroke Father     High Blood Pressure Brother     Heart Disease Brother     Cancer Maternal Grandmother     Diabetes type 2  Son     Heart Attack Son     Heart Disease Maternal Grandfather     Stroke Paternal Grandmother     Heart Disease Paternal Grandfather         ?        Vitals:  /77   Pulse 71   Temp 97.7 °F (36.5 °C) (Oral)   Resp 16   Ht 5' 10\" (1.778 m)   Wt 180 lb 12.4 oz (82 kg)   SpO2 99%   BMI 25.94 kg/m²   Temp (24hrs), Av.1 °F (36.7 °C), Min:97.7 °F (36.5 °C), Max:98.4 °F (36.9 °C)    Recent Labs     22  1125 22  1618 22  1930 22  0737   POCGLU 175* 158* 227* 102         I/O (24Hr): Intake/Output Summary (Last 24 hours) at 2022 1051  Last data filed at 2022 0445  Gross per 24 hour   Intake 250 ml   Output 1000 ml   Net -750 ml         Labs:  Hematology:  Recent Labs     22  0711 22  0551 22  0625   WBC 9.1 6.5 6.2   RBC 3.37* 3.10* 3.18*   HGB 10.7* 10.1* 10.2*   HCT 31.1* 28.5* 29.3*   MCV 92.3 91.9 92.1   MCH 31.8 32.6 32.1   MCHC 34.4 35.4* 34.8   RDW 12.1 12.4 12.6    224 252   MPV 10.4 11.5 11.7       Chemistry:  Recent Labs     22  0711 22  0551 22  0625    139 136   K 4.0 4.2 4.0   * 109* 105   CO2 22 21 23   GLUCOSE 72 67* 97   BUN 19 19 17   CREATININE 1.50* 1.46* 1.43*   MG 1.8 1.9 2.0   ANIONGAP 10 9 8*   LABGLOM 44* 46* 47*   GFRAA 54* 55* 57*   CALCIUM 8.0* 8.2* 8.3*   PHOS 3.6 3.5 3.2       Recent Labs     22  0711 22  0732 22  0551 22  0714 22  0741 22  1125 22  1618 22  19322  0625 22  0737   LABALBU 3.4*  --  3.0*  --   --   --   --   --  3.2*  --    POCGLU  --    < >  --  68* 88 175* 158* 227*  --  102    < > = values in this interval not displayed. ABG:No results found for: POCPH, PHART, PH, POCPCO2, JUO0ECT, PCO2, POCPO2, PO2ART, PO2, POCHCO3, XDF7PDI, HCO3, NBEA, PBEA, BEART, BE, THGBART, THB, VOM4AVE, IXIY0VLT, V6OFUTTW, O2SAT, FIO2  No results found for: SPECIAL  No results found for: CULTURE    Radiology:  XR CHEST PORTABLE    Result Date: 2022  Clear lungs.        Physical Examination:        General appearance:  alert, cooperative and no distress  Mental Status:  oriented to person, place and time and normal affect  Lungs:  clear to auscultation bilaterally, normal effort  Heart: Irregularly irregular rate rhythm no murmurs rubs gallops  Abdomen:  soft, nontender, nondistended, normal bowel sounds, no masses, hepatomegaly, splenomegaly  Extremities:  no edema, redness, tenderness in the calves  Skin:  no gross lesions, rashes, induration    Assessment:        Hospital Problems             Last Modified POA    * (Principal) Atrial fibrillation with RVR ([address] 8/23/2022 Yes    NSTEMI (non-ST elevated myocardial infarction) ([address] 8/27/2022 Yes    Lactic acid acidosis 8/23/2022 Yes    CKD (chronic kidney disease) stage 3, GFR 30-59 ml/min ([facility]) 8/23/2022 Yes    Orthostatic hypotension 8/27/2022 Yes    RENE (acute kidney injury) ([address] 8/27/2022 Yes    Controlled type 2 diabetes with neuropathy ([address] 8/23/2022 Yes    CAD (coronary artery disease) 8/23/2022 Yes    Bradycardia 8/27/2022 Yes    Demand ischemia ([address] 8/23/2022 Yes    Unstable angina ([address] 8/23/2022 Yes    Anemia 8/23/2022 Yes   Plan:        Atrial fibrillation with RVR-patient was on amiodarone and Toprol-XL, patient with heart rates of 40s to 50s with Toprol-XL held. Still having dizziness last few days due to orthostatic hypotension which seems to have improved this morning.   CAD  Type 2 diabetes  Plan:  Positive tilt table test, started on Florinef  Outpatient follow-up with cardiology      Michelle Weathers DO  8/29/2022  10:51 AM

## 2022-08-29 NOTE — PROCEDURES
[facility]    Tilt Table Test Report                       Today's Date:  8/29/2022  Patient Name:  Nicole Mathew  Date of admission: 8/22/2022  8:22 PM  Patient's age:  80 y. o., 1935  Admission Dx:  Atrial fibrillation with RVR ([address] [I48.91]    Requesting Physician: No admitting provider for patient encounter. Procedures performed: Tilt table testing    Indication of the procedure:  Nicole Mathew is a 80 y.o. male who presented with dizziness and lightheadedness     Details of procedure: The procedure, the risks, benefits and alternatives of the procedure were explained to the patient. All questions were answered. Patient verbalized understanding and informed consent was obtained. The patient was brought to the electrophysiology lab in a fasting nonsedated state. Peripheral IV access was obtained and the patient was put on the tilt table. Initial vital signs at baseline:    BP: 168/60 mmHg. HR: 65 bpm.    Then the tilt table was raised to 70 degree. Immediately upon raising the table the vitals were:     BP: 128/61 mmHg. HR: 88 bpm.    After 20 minutes, patient was feeling dizzy and NTG was not given due to similar reasons       BP: 102/44 mmHg. HR: 91 bpm.      At this time, the patient experienced dizziness . The Tilt table test was immediately brought back to [place] position     At the end of procedure:  BP: 134/60 mmHg. HR: 63 bpm.      The patient tolerated the procedure well and there were no complications. Conclusion:    Positive for orthostatic hypotension     Recommendations:    Recommended to avoid dehydration, paying close attention to any prodromal symptoms and how to avert syncope by lying down and elevating legs. Patient also to have compression stocking and encouraged to use them when working long hours and need to stand for a long time. Discussed with patient and Nurse.       Adiel Roberts MD, MD  Fellow, [address] [place] [address] [facility]              I have reviewed the case / procedure with resident / fellow  I have examined the patient personally  Patient agree with treatment plan as discussed before, final arrangement based on my evaluation and exam.    Risk and benefit of procedure planned were explained in details. Procedure was performed by me personally, with all aspect of the procedure being done using standard protocol. Note was modified based on my own assessment and treatment.     Juhi Romero MD  [facility]

## 2022-08-29 NOTE — PROGRESS NOTES
[facility]   Progress Note                   Date:   8/29/2022  Patient name: Tristan Dominguez  Date of admission:  8/22/2022  8:22 PM  MRN:   [id number]  YOB: 1935  PCP: Dilcia Martinez MD    Reason for Admission: Atrial fibrillation with RVR ([facility] [I48.91]    Subjective:       Clinical Changes / Abnormalities:   Patient seen and examined. Denies chest pain or shortness of breath. Tele/vitals/labs reviewed . Medications:   Scheduled Meds:   amiodarone  200 mg Oral Daily    pantoprazole  40 mg Oral BID AC    sodium chloride flush  5-40 mL IntraVENous 2 times per day    [Held by provider] ranolazine  500 mg Oral BID    rivaroxaban  20 mg Oral Daily    clopidogrel  75 mg Oral Daily    amLODIPine  5 mg Oral Daily    atorvastatin  80 mg Oral Nightly    insulin glargine  15 Units SubCUTAneous Nightly    metoprolol succinate  12.5 mg Oral Daily    sodium chloride flush  5-40 mL IntraVENous 2 times per day    insulin lispro  0-8 Units SubCUTAneous TID WC    insulin lispro  0-4 Units SubCUTAneous Nightly     Continuous Infusions:   sodium chloride      sodium chloride      dextrose       CBC:   Recent Labs     08/27/22  0711 08/28/22  0551 08/29/22  0625   WBC 9.1 6.5 6.2   HGB 10.7* 10.1* 10.2*    224 252       BMP:    Recent Labs     08/27/22  0711 08/28/22  0551 08/29/22  0625    139 136   K 4.0 4.2 4.0   * 109* 105   CO2 22 21 23   BUN 19 19 17   CREATININE 1.50* 1.46* 1.43*   GLUCOSE 72 67* 97       Hepatic:   No results for input(s): AST, ALT, ALB, BILITOT, ALKPHOS in the last 72 hours. Troponin:   No results for input(s): TROPHS in the last 72 hours. BNP: No results for input(s): BNP in the last 72 hours. Lipids: No results for input(s): CHOL, HDL in the last 72 hours. Invalid input(s): LDLCALCU  INR:   No results for input(s): INR in the last 72 hours.   EKG:    Date: 08/23/22  Reading: No acute ischemia  NSR with T wave [id number] in lateral leads     LAST ECHO:  Date: 01/26/2018  Findings Summary:  Left ventricle is normal in size. Global left ventricular systolic function  is low normal. Estimated ejection fraction is 50 % . Normal right ventricular size and function. No significant valvular regurgitation or stenosis seen. LAST Stress Test:   Date of last ST:  Major Findings:     LAST Cardiac Angiography:.  Date:  Findings:  Cardiac Arteries and Lesion Findings     LMCA: 20-30% distal lesion     LAD: 40% mid lesion     LCx: Mild irregularities 10-20%. 70% proximal OM 1 lesion and 99% OM 2  lesion, both <2.0 caliber vessels     RCA: 95% proximal lesion, patent mid stent and patent ostial PDA stent, 70%  PDA lesion and 99% RPL lesion <2.0 mm vessel       Lesion on Prox RCA: Proximal subsection. 95% stenosis 60 mm length reduced    to 0%. Pre procedure MARK III flow was noted. Post Procedure MARK III    flow was present. Good runoff was present. The lesion was diagnosed as    High Risk (C). Devices used       - [employer] Flex 180 cm. Number of passes: 1.       - Trek Balloon 2.5mm x 12mm. 1 inflation(s) to a max pressure of: 16    antonia. - 6 fr GuideLiner. Number of passes: 1.       - Xience Alpine 3.0 x 38 RAZA. 1 inflation(s) to a max pressure of: 16    antonia. - Xience Alpine 3.0 x 18 RAZA. 1 inflation(s) to a max pressure of: 18    antonia. - [place] [employer] [place] Balloon 3.5x15. 1 inflation(s) to a max pressure of:    14 antonia. - [employer] [place] Balloon 3.5x15. 1 inflation(s) to a max pressure of:    18 antonia. - [employer] [place] Balloon 3.5x15. 1 inflation(s) to a max pressure of:    18 antonia. - NC [employer] [place] Balloon 3.5x15. 1 inflation(s) to a max pressure of:    18 antonia. Lesion on R PDA: Mid subsection. 70% stenosis 10 mm length reduced to 0%. Pre procedure MARK III flow was noted. Post Procedure MARK III flow was    present. Good runoff was present. The lesion was diagnosed as Low Risk    (A).        Devices used       - Xience Alpine 2.5 x 12 RAZA. 1 inflation(s) to a max pressure of: 14    antonia. Coronary Tree      Dominance: Right     LV Analysis  LV function assessed as:Normal.  Ejection Fraction  +----------------------------------------+---------------------------------+  ! Method                                  ! EF%                              ! +----------------------------------------+---------------------------------+  ! Estimated                               ! 50                               !  +----------------------------------------+---------------------------------+         Cardiac Arteries and Lesion Findings       LMCA: Mild irregularities 10-20%. LAD: Mid diffuse 50% stenosis same as on last angiogram.   D1 is intermediate with mid diffuse 60% stenosis   D2 is small and patent     LCx: Mild irregularities 20-30%. OM1 is small and patent   OM2 is small with ostial 100% occlusion and left to left collaterals     RCA: Proximal 100% chronic in-stent restenosis with good left to right   collaterals      Coronary Tree        Dominance: Right      Procedure Summary        1. Chronic total occlusion of RCA with left to right Collaterals    2. Moderate Nonobstructive disease in LAD/D1 same as on last angiogram    3. Normal LVEDP. LV gram not done due to renal insufficiency        Recommendations        Routine Post Diagnostic Cath orders. Optimize medical therapy for CAD    Resume anticoagulation    Risk factor modification.       Estimated Blood Loss:            [x] Minimal 10 cc   [] Minimal < 25 cc      [] Moderate 25-50 cc         [] >50 cc        Electronically signed by Lennox Simpers, MD on 8/26/2022 at 10:17 AM  Cardiovascular fellow  [facility]     Chris Bell MD, [facility], [facility]                       Tilt table Conclusion:     Positive for orthostatic hypotension      Recommendations:     Recommended to avoid dehydration, paying close attention to any prodromal symptoms and how to avert syncope by lying down and elevating legs. Patient also to have compression stocking and encouraged to use them when working long hours and need to stand for a long time. Discussed with patient and Nurse. Zoila Castillo MD, MD  Fellow, [address] [place] [address]             Objective:   Vitals: /67   Pulse 87   Temp 97.7 °F (36.5 °C) (Oral)   Resp 16   Ht 5' 10\" (1.778 m)   Wt 180 lb 12.4 oz (82 kg)   SpO2 96%   BMI 25.94 kg/m²   General appearance: alert and cooperative with exam  HEENT: Head: Normocephalic, no lesions, without obvious abnormality. Neck: no JVD, trachea midline, no adenopathy  Lungs: Clear to auscultation  Heart: Regular rate and rhythm, s1/s2 auscultated, no murmurs  Abdomen: soft, non-tender, bowel sounds active  Extremities: no edema  Neurologic: not done        Assessment / Acute Cardiac Problems:   NSTEMI, troponins are 13 and 28, St depression in V4-V6 and AVL.   A. fib with RVR, UHV2DG2-ONNa score 6, currently in NSR, TSH not done, and rivaroxaban at home 20 mg daily  A.fib with aberrancy  Hx of CAD s/p stenting of RCA in 1998 with repeat heart cath in 2018 for STEMI s/p 2 RAZA to mid to proximal RCA and mid PDA  Chronic HFpEF, EF 50% on echo from 2018  Hypertension  Type 2 diabetes mellitus  Hyperlipidemia  Hx of CVA   RENE  Anemia of chronic disease    Patient Active Problem List:     Controlled type 2 diabetes with neuropathy (HCC)     Essential hypertension     CAD (coronary artery disease)     Bradycardia     Demand ischemia (HCC)     Unstable angina (HCC)     Anxiety     Chronic rhinitis     Diabetic retinopathy (HCC)     Dizziness     Hyperlipidemia     Insomnia     Lumbar spondylolysis     Stroke syndrome     AK (actinic keratosis)     Chronic sinusitis     Anemia     Arthritis     Paroxysmal atrial fibrillation (HCC)     S/P skin biopsy     Squamous cell carcinoma in situ     Atrial fibrillation with RVR ([facility] [address] Lactic acid acidosis     CKD (chronic kidney disease) stage 3, GFR 30-59 ml/min ([facility])      Plan of Treatment:       Status post cardiac catheterization with no intervention as above  Afib. Currently in sinus rhythm -    continue amio and  BB with parameter and Xarelto. Dizziness.   Positive orthostatic BP per tilt - Recommend  starting patient on florinef 0.1 mg po daily  or SSRI on discharge ,  Encourage adequate hydration and  compression sock on   [facility] discharge from cardiology standpoint,  follow-up with primary cardiology in 2 weeks ( Dr. Kris Zambrano )         Electronically signed by SANAM Javier NP on 8/29/2022 at 10:15 AM  [address]  [phone]

## 2022-08-30 ENCOUNTER — CARE COORDINATION (OUTPATIENT)
Dept: CASE MANAGEMENT | Age: 87
End: 2022-08-30

## 2022-08-30 DIAGNOSIS — I48.91 ATRIAL FIBRILLATION WITH RVR (HCC): Primary | ICD-10-CM

## 2022-08-30 PROCEDURE — 1111F DSCHRG MED/CURRENT MED MERGE: CPT | Performed by: FAMILY MEDICINE

## 2022-08-30 NOTE — CARE COORDINATION
[facility] Transitions Initial Follow Up Call    Call within 2 business days of discharge: Yes    Patient: Rabia Live Patient : 1935   MRN: <[id number]>  Reason for Admission: chest pain   AFIB  Discharge Date: 22 RARS: Readmission Risk Score: 15.4      Last Discharge [facility]       Date Complaint Diagnosis Description Type Department Provider    22 Chest Pain Atrial fibrillation with RVR ([address] . .. ED to Hosp-Admission (Discharged) (ADMITTED) [facility] 2 [facility] DO; [facility] .. Spoke with: Transitions of Care Initial Call: Spoke to Ed. Explained the role of Care Transition Nurse and the Transition program, patient is agreeable to follow up calls Post discharge from the [facility] [address] [facility] states he is feeling \"ok\" now. He has no chest pain , palpitations or dyspnea. He states he did not feel well last evening. \" I had a spell of some sort\" pt states he felt hot, weak and shaky. He is concerned this happened as a result of one of his new medications. He has no appetite and has had 2 small BM's in 1 week. Pt states he took laxative -Miralax at home and prior to discharge. No BM yet . 1111F medication reconciliation completed. Taking all medications as prescribed. FBS today 117. Discussed discharge follow up visit with PCP on 22. Pt verbalized understanding and states he also has an appt with cardiologist Dr Kristin Carmona on 22. He will drive or his wife will transport him to appts. Will continue to follow. Declines need for HHC or DME at this time. Patient is aware of when to contact MD with any new or worsening symptoms. Advised to contact PCP  with any health concerns for early outpatient intervention in an effort to avoid hospitalization. Report any worsening symptoms to PCP and/or Call 911 and/or GO TO EMERGENCY ROOM if symptoms are severe. Expresses understanding. Transitions of Care Initial Call    Was this an external facility discharge?  No Discharge Facility: n/a    Challenges to be reviewed by the provider   Additional needs identified to be addressed with provider: Yes  none             Method of communication with provider : none    Advance Care Planning:   Does patient have an Advance Directive: reviewed and current. LPN Care Coordinator contacted the patient by telephone to perform post hospital discharge assessment. Verified name and  with patient as identifiers. Provided introduction to self, and explanation of the LPN CC role. LPN CC reviewed discharge instructions, medical action plan and red flags with patient who verbalized understanding. Patient given an opportunity to ask questions and does not have any further questions or concerns at this time. Were discharge instructions available to patient? Yes. Reviewed appropriate site of care based on symptoms and resources available to patient including: PCP  Specialist  When to call [address] The patient agrees to contact the PCP office for questions related to their healthcare. Medication reconciliation was performed with patient, who verbalizes understanding of administration of home medications. Advised obtaining a 90-day supply of all daily and as-needed medications. Was patient discharged with a pulse oximeter? no    LPN CC provided contact information. Plan for follow-up call in 5-7 days based on severity of symptoms and risk factors.   Plan for next call: symptom management-chest pain  dyspnea  self management-monitor glucose levels  follow up appointment-with PCP AND CARDIOLOGY  medication management-TAKE all medications as prescribed       Facility [address]    Non-face-to-face services provided:  Obtained and reviewed discharge summary and/or continuity of care documents    Care Transitions 24 Hour Call    Care Transitions Interventions         Follow Up  Future Appointments   Date Time Provider Elina Perla   2022 10:15 AM Jaydon Coles MD D[facility]   10/19/2022  9:30 AM Dilcia Martinez MD [facility]   11/22/2022  9:00 AM Patrice Mckenzie PA-C [facility] [address] Care Transitions Nurse   [phone]

## 2022-09-01 ENCOUNTER — OFFICE VISIT (OUTPATIENT)
Dept: PRIMARY CARE CLINIC | Age: 87
End: 2022-09-01
Payer: MEDICARE

## 2022-09-01 VITALS
DIASTOLIC BLOOD PRESSURE: 58 MMHG | WEIGHT: 177 LBS | HEART RATE: 61 BPM | SYSTOLIC BLOOD PRESSURE: 130 MMHG | BODY MASS INDEX: 25.4 KG/M2 | OXYGEN SATURATION: 97 %

## 2022-09-01 DIAGNOSIS — E83.42 HYPOMAGNESEMIA: ICD-10-CM

## 2022-09-01 DIAGNOSIS — E11.22 TYPE 2 DIABETES MELLITUS WITH STAGE 3A CHRONIC KIDNEY DISEASE, WITH LONG-TERM CURRENT USE OF INSULIN (HCC): ICD-10-CM

## 2022-09-01 DIAGNOSIS — N18.31 TYPE 2 DIABETES MELLITUS WITH STAGE 3A CHRONIC KIDNEY DISEASE, WITH LONG-TERM CURRENT USE OF INSULIN (HCC): ICD-10-CM

## 2022-09-01 DIAGNOSIS — Z79.4 TYPE 2 DIABETES MELLITUS WITH STAGE 3A CHRONIC KIDNEY DISEASE, WITH LONG-TERM CURRENT USE OF INSULIN (HCC): ICD-10-CM

## 2022-09-01 DIAGNOSIS — I48.91 ATRIAL FIBRILLATION WITH RVR (HCC): ICD-10-CM

## 2022-09-01 DIAGNOSIS — E11.9 TYPE 2 DIABETES MELLITUS WITHOUT COMPLICATION, WITH LONG-TERM CURRENT USE OF INSULIN (HCC): Primary | ICD-10-CM

## 2022-09-01 DIAGNOSIS — N18.31 STAGE 3A CHRONIC KIDNEY DISEASE (HCC): ICD-10-CM

## 2022-09-01 DIAGNOSIS — Z79.4 TYPE 2 DIABETES MELLITUS WITHOUT COMPLICATION, WITH LONG-TERM CURRENT USE OF INSULIN (HCC): Primary | ICD-10-CM

## 2022-09-01 DIAGNOSIS — I25.10 CORONARY ARTERY DISEASE INVOLVING NATIVE CORONARY ARTERY OF NATIVE HEART WITHOUT ANGINA PECTORIS: ICD-10-CM

## 2022-09-01 DIAGNOSIS — I95.1 ORTHOSTATIC HYPOTENSION: ICD-10-CM

## 2022-09-01 DIAGNOSIS — N18.32 STAGE 3B CHRONIC KIDNEY DISEASE (HCC): ICD-10-CM

## 2022-09-01 LAB — HBA1C MFR BLD: 7 %

## 2022-09-01 PROCEDURE — 1036F TOBACCO NON-USER: CPT | Performed by: FAMILY MEDICINE

## 2022-09-01 PROCEDURE — 3051F HG A1C>EQUAL 7.0%<8.0%: CPT | Performed by: FAMILY MEDICINE

## 2022-09-01 PROCEDURE — G8417 CALC BMI ABV UP PARAM F/U: HCPCS | Performed by: FAMILY MEDICINE

## 2022-09-01 PROCEDURE — 1111F DSCHRG MED/CURRENT MED MERGE: CPT | Performed by: FAMILY MEDICINE

## 2022-09-01 PROCEDURE — G8427 DOCREV CUR MEDS BY ELIG CLIN: HCPCS | Performed by: FAMILY MEDICINE

## 2022-09-01 PROCEDURE — 83036 HEMOGLOBIN GLYCOSYLATED A1C: CPT | Performed by: FAMILY MEDICINE

## 2022-09-01 PROCEDURE — 1123F ACP DISCUSS/DSCN MKR DOCD: CPT | Performed by: FAMILY MEDICINE

## 2022-09-01 PROCEDURE — 99214 OFFICE O/P EST MOD 30 MIN: CPT | Performed by: FAMILY MEDICINE

## 2022-09-01 RX ORDER — AMIODARONE HYDROCHLORIDE 200 MG/1
200 TABLET ORAL DAILY
Qty: 90 TABLET | Refills: 0 | Status: CANCELLED | OUTPATIENT
Start: 2022-09-01 | End: 2022-10-01

## 2022-09-01 RX ORDER — FLUDROCORTISONE ACETATE 0.1 MG/1
0.1 TABLET ORAL DAILY
Qty: 90 TABLET | Refills: 3 | Status: SHIPPED | OUTPATIENT
Start: 2022-09-01 | End: 2022-10-01

## 2022-09-01 RX ORDER — PANTOPRAZOLE SODIUM 40 MG/1
40 TABLET, DELAYED RELEASE ORAL
Qty: 180 TABLET | Refills: 3 | Status: SHIPPED | OUTPATIENT
Start: 2022-09-01

## 2022-09-01 ASSESSMENT — ENCOUNTER SYMPTOMS
CHEST TIGHTNESS: 0
SHORTNESS OF BREATH: 0
WHEEZING: 0

## 2022-09-01 NOTE — PATIENT INSTRUCTIONS
Consider decrease or stop of amio due to side effects. Consider Coreg as option as long as BP stays stable. Will let cardiology decide on options.

## 2022-09-01 NOTE — PROGRESS NOTES
[facility] PRIMARY CARE  [address] [place]  [facility] [address]  Dept: [phone]    Rob Zarate is a 80 y.o. male Established patient, who presents today for his medical conditions/complaints as noted below. Chief Complaint   Patient presents with    Atrial Fibrillation    Follow-Up from Hospital     Patient was admitted due to A fib to [facility]. D/C on 8/29/22    Diabetes     Patient checks blood sugar BID     Hypertension     Patient doesn't check B/P at home        HPI:     HPI  Admitted to [facility]. V's for 7 days, went in with chest pain and a racing heart. Was put on an amio drip in ED, started on amiodarone. Was started on Toprol-XL but DC due to lightheadedness. Tilt table test showed orthostatic hypotension. Heart cath with no stent placement, stress test positive for ischemia. Still feeling weak. BS today 116. No dizziness, lightheadedness. Feels like legs are weak and shaky. No chest pain.      Reviewed prior notes: Cardiology   Reviewed previous:  Labs and Imaging, hospital records    LDL Cholesterol (mg/dL)   Date Value   01/26/2018 39   10/05/2015 51     LDL Calculated (mg/dL)   Date Value   02/08/2021 51   03/06/2018 54   10/18/2016 70       (goal LDL is <100)   AST (U/L)   Date Value   08/23/2022 22     ALT (U/L)   Date Value   08/23/2022 12     BUN (mg/dL)   Date Value   08/29/2022 17     Hemoglobin A1C (%)   Date Value   09/01/2022 7.0     TSH (mIU/L)   Date Value   10/04/2015 2.99     BP Readings from Last 3 Encounters:   09/01/22 (!) 130/58   08/29/22 (!) 131/58   08/05/22 118/70          (goal 120/80)    Past Medical History:   Diagnosis Date    Acute myocardial ischemia ([address] 12/11/2015    Acute sinusitis 12/11/2015    Cerebral artery occlusion with cerebral infarction [facility])     Chest pain 10/5/2015    Diabetes mellitus ([address]     H/O cataract     Hyperlipidemia     Hypertension     MI (myocardial infarction) ([address] 1985    Neck stiffness 12/11/2015    Partial small bowel obstruction ([address] 11/7/2019    Peyronie's disease     Rotator cuff tendonitis 12/11/2015    TIA (transient ischemic attack) 1998      Past Surgical History:   Procedure Laterality Date    CARDIAC CATHETERIZATION      mulitple caths- 5 stents total    CARDIAC SURGERY  01/2018    3 stents    CATARACT REMOVAL WITH IMPLANT Left     SKIN BIOPSY      forehead       Family History   Problem Relation Age of Onset    Cancer Mother         oral    Stroke Father     High Blood Pressure Brother     Heart Disease Brother     Cancer Maternal Grandmother     Diabetes type 2  Son     Heart Attack Son     Heart Disease Maternal Grandfather     Stroke Paternal Grandmother     Heart Disease Paternal Grandfather         ?        Social History     Tobacco Use    Smoking status: Former     Packs/day: 0.50     Years: 8.00     Pack years: 4.00     Types: Cigarettes    Smokeless tobacco: Never   Substance Use Topics    Alcohol use: No      Current Outpatient Medications   Medication Sig Dispense Refill    fludrocortisone (FLORINEF) 0.1 MG tablet Take 1 tablet by mouth daily 90 tablet 3    pantoprazole (PROTONIX) 40 MG tablet Take 1 tablet by mouth 2 times daily (before meals) 180 tablet 3    amiodarone (CORDARONE) 200 MG tablet Take 1 tablet by mouth daily 30 tablet 0    ULTICARE SHORT PEN NEEDLES 31G X 8 MM MISC INJECT 1 NEEDLE INTO THE SKIN DAILY 100 each 3    Accu-Chek FastClix Lancets MISC USE ONE LANCET TO TEST TWICE A DAY AND AS NEEDED 102 each 3    blood glucose test strips (FREESTYLE TEST STRIPS) strip USE ONE STRIP TO TEST TWICE A DAY AND AS NEEDED 150 strip 1    atorvastatin (LIPITOR) 80 MG tablet TAKE 1 TABLET NIGHTLY 90 tablet 3    clopidogrel (PLAVIX) 75 MG tablet TAKE 1 TABLET DAILY 90 tablet 3    LANTUS SOLOSTAR 100 UNIT/ML injection pen INJECT 15 UNITS INTO THE SKIN NIGHTLY 12 mL 2    XARELTO 20 MG TABS tablet TAKE 1 TABLET DAILY 90 tablet 3    amLODIPine (NORVASC) 5 MG tablet TAKE 1 TABLET DAILY 90 tablet 3    Blood Glucose Monitoring Suppl (FREESTYLE FREEDOM LITE) w/Device KIT 1 kit by Does not apply route daily as needed (check sugars bid and prn) 1 kit 0    acetaminophen (TYLENOL) 500 MG tablet Take 500 mg by mouth every 6 hours as needed for Pain      Lancet Device MISC 1 Device by Does not apply route once for 1 dose 1 Device 0    nitroGLYCERIN (NITROSTAT) 0.4 MG SL tablet Place 1 tablet under the tongue every 5 minutes as needed for Chest pain 25 tablet 1    Coenzyme Q10 (CO Q-10) 30 MG CAPS Take 30 mg by mouth daily      Ascorbic Acid (VITAMIN C) 250 MG tablet Take 500 mg by mouth daily      ferrous sulfate 325 (65 FE) MG tablet Take 325 mg by mouth daily        No current facility-administered medications for this visit. Allergies   Allergen Reactions    Sulfa Antibiotics Anaphylaxis       Health Maintenance   Topic Date Due    DTaP/Tdap/Td vaccine (1 - Tdap) Never done    Shingles vaccine (1 of 2) Never done    Lipids  02/08/2022    Flu vaccine (1) 09/01/2022    Annual Wellness Visit (AWV)  02/22/2023    Depression Screen  06/01/2023    Pneumococcal 65+ years Vaccine  Completed    COVID-19 Vaccine  Completed    Hepatitis A vaccine  Aged Out    Hib vaccine  Aged Out    Meningococcal (ACWY) vaccine  Aged Out       Subjective:      Review of Systems   Constitutional:  Positive for fatigue. Negative for chills and fever. Respiratory:  Negative for chest tightness, shortness of breath and wheezing. Cardiovascular:  Negative for chest pain, palpitations and leg swelling. Neurological:  Positive for light-headedness (every once in a while). Negative for dizziness and headaches. Objective:     BP (!) 130/58   Pulse 61   Wt 177 lb (80.3 kg)   SpO2 97%   BMI 25.40 kg/m²   Physical Exam  Vitals and nursing note reviewed. Constitutional:       General: He is not in acute distress. Appearance: He is well-developed. He is not ill-appearing.    HENT:      Head: Normocephalic and atraumatic. Right Ear: External ear normal.      Left Ear: External ear normal.   Eyes:      General: No scleral icterus. Right eye: No discharge. Left eye: No discharge. Conjunctiva/sclera: Conjunctivae normal.   Neck:      Thyroid: No thyromegaly. Trachea: No tracheal deviation. Cardiovascular:      Rate and Rhythm: Normal rate and regular rhythm. Heart sounds: Normal heart sounds. Pulmonary:      Effort: Pulmonary effort is normal. No respiratory distress. Breath sounds: Normal breath sounds. No wheezing. Lymphadenopathy:      Cervical: No cervical adenopathy. Skin:     General: Skin is warm. Findings: No rash. Neurological:      Mental Status: He is alert and oriented to person, place, and time. Psychiatric:         Mood and Affect: Mood normal.         Behavior: Behavior normal.         Thought Content: Thought content normal.       Assessment/Plan:   1. Type 2 diabetes mellitus without complication, with long-term current use of insulin ([facility])  -     POCT glycosylated hemoglobin (Hb A1C)  2. Atrial fibrillation with RVR ([facility]  Comments:  Consider decrease or stop of amio due to side effects. Consider Coreg as option as long as BP stays stable. Will let cardiology decide on options. Assessment & Plan:   Well-controlled, continue current medications  3. Coronary artery disease involving native coronary artery of native heart without angina pectoris  Assessment & Plan:   Well-controlled, continue current medications  Orders:  -     Basic Metabolic Panel; Future  -     Magnesium; Future  4. Hypomagnesemia  Assessment & Plan:   stop magnesium and recheck  Orders:  -     Basic Metabolic Panel; Future  -     Magnesium; Future  5. Orthostatic hypotension  Assessment & Plan:   on florinef and compression socks  6. Stage 3a chronic kidney disease ([facility]  Assessment & Plan:   continue off metformin  7.  Type 2 diabetes mellitus with stage 3a chronic kidney disease, with long-term current use of insulin ([facility]       Return in about 3 months (around 12/1/2022) for HTN f/u, DM check, CAD f/u. Data Unavailable     Orders Placed This Encounter   Procedures    Basic Metabolic Panel     Standing Status:   Future     Standing Expiration Date:   9/2/2023    Magnesium     Standing Status:   Future     Standing Expiration Date:   9/1/2023    POCT glycosylated hemoglobin (Hb A1C)     Orders Placed This Encounter   Medications    fludrocortisone (FLORINEF) 0.1 MG tablet     Sig: Take 1 tablet by mouth daily     Dispense:  90 tablet     Refill:  3    pantoprazole (PROTONIX) 40 MG tablet     Sig: Take 1 tablet by mouth 2 times daily (before meals)     Dispense:  180 tablet     Refill:  3         Patient given educational materials - see patient instructions. Discussed use, benefit, and side effects of prescribed medications. All patient questions answered. Pt voiced understanding. Reviewed health maintenance. Instructed to continue current medications, diet and exercise. Patient agreed with treatment plan. Follow up as directed.      Electronically signed by Mathew Gilliland MD on 9/1/2022 at 11:37 AM

## 2022-09-06 ENCOUNTER — CARE COORDINATION (OUTPATIENT)
Dept: CASE MANAGEMENT | Age: 87
End: 2022-09-06

## 2022-09-06 NOTE — CARE COORDINATION
[facility] Transitions Follow Up Call    2022    Patient: Ghislaine Kapoor  Patient : 1935   MRN: <[id number]>  Reason for Admission: Atrial fibrillation with RVR  chest pain  Discharge Date: 22 RARS: Readmission Risk Score: 15.4         Spoke with: Subsequent transitional call. No answer. Left HIPPA compliant message to return call to this writer. Care Transitions Subsequent and Final Call    Subsequent and Final Calls  Care Transitions Interventions  Other Interventions:              Follow Up  Future Appointments   Date Time Provider Department Center   2022 11:50 AM Fani Atkinson APRN - CNP AFL Neph [facility] None   10/19/2022  9:30 AM MD STANTON Gifford [facility]   2022  9:00 AM MIRELLA Kirk TOP   2022 10:30 AM MD STANTON Gifford [address]  [facility] Care Transitions Nurse   [phone]

## 2022-09-07 DIAGNOSIS — N18.32 STAGE 3B CHRONIC KIDNEY DISEASE (HCC): ICD-10-CM

## 2022-09-08 ENCOUNTER — CARE COORDINATION (OUTPATIENT)
Dept: CASE MANAGEMENT | Age: 87
End: 2022-09-08

## 2022-09-08 NOTE — CARE COORDINATION
[facility] Transitions Follow Up Call    2022    Patient: Uriel Adhikari  Patient : 1935   MRN: <[id number]>  Reason for Admission: AFIB  WITH RVR    Discharge Date: 22 RARS: Readmission Risk Score: 15.4         Spoke with: Subsequent transitional call. Spoke to :  Stiven Irving states \" I'm doing pretty well\" Denies chest pain , palpitations or dyspnea. He is weak and fatigued. Appetite is good. Taking all medications as prescribed. Pt was seen by PCP on 22 and by Cardiology Dr Grisel Waters on 22. Next appt in 3 months with Cardiology. And in 6 weeks with PCP. No new issues or concerns . Will continue to follow. Patient is aware of when to contact MD with any new or worsening symptoms. Advised to contact PCP  with any health concerns for early outpatient intervention in an effort to avoid hospitalization. Report any worsening symptoms to PCP and/or Call 911 and/or GO TO EMERGENCY ROOM if symptoms are severe. Expresses understanding. Care Transitions Follow Up Call    Needs to be reviewed by the provider   Additional needs identified to be addressed with provider: No  none             Method of communication with provider : none      LPN Care Coordinator contacted the patient by telephone to follow up after admission on 22. Verified name and  with patient as identifiers. Addressed changes since last contact: none  Discussed follow-up appointments. If no appointment was previously scheduled, appointment scheduling offered: Yes. Is follow up appointment scheduled within 7 days of discharge? Yes. Advance Care Planning:   Does patient have an Advance Directive: reviewed and current. LPN CC reviewed discharge instructions, medical action plan and red flags with patient and discussed any barriers to care and/or understanding of plan of care after discharge.  Discussed appropriate site of care based on symptoms and resources available to patient including: PCP  Specialist  When to call [address] The patient agrees to contact the PCP office for questions related to their healthcare. Patients top risk factors for readmission: medical condition-AFIB   CHEST PAIN  Interventions to address risk factors: Obtained and reviewed discharge summary and/or continuity of care documents      Non-[facility] follow up appointment(s): DR BARILLAS [facility]    LPN CC provided contact information for future needs. Plan for follow-up call in 7-10 days based on severity of symptoms and risk factors. Plan for next call: symptom management-CHEST PAIN  palpitations dyspnea         Care Transitions Subsequent and Final Call    Subsequent and Final Calls  Do you have any ongoing symptoms?: Yes  Onset of Patient-reported symptoms: In the past 7 days  Patient-reported symptoms: Weakness, Fatigue  Have your medications changed?: No  Do you have any questions related to your medications?: No  Do you currently have any active services?: No  Do you have any needs or concerns that I can assist you with?: No  Identified Barriers: None  Care Transitions Interventions  No Identified Needs  Other Interventions:              Follow Up  Future Appointments   Date Time Provider Elina Perla   9/13/2022 10:15 AM Milus MIRELLA Medina [facility]   9/14/2022 11:50 AM SANAM Headley - CNP [facility] None   10/19/2022  9:30 AM MD STANTON De Santiago [facility]   11/22/2022  9:00 AM MilMIRELLA Kenny [facility]   12/2/2022 10:30 AM MD STANTON De Santiago  [address] [place], DANIELLE Palencia LPN Care Transitions Nurse   [phone]

## 2022-09-09 RX ORDER — AMIODARONE HYDROCHLORIDE 200 MG/1
200 TABLET ORAL DAILY
Qty: 90 TABLET | Refills: 0 | Status: SHIPPED | OUTPATIENT
Start: 2022-09-09 | End: 2022-10-31

## 2022-09-13 ENCOUNTER — OFFICE VISIT (OUTPATIENT)
Dept: PRIMARY CARE CLINIC | Age: 87
End: 2022-09-13
Payer: MEDICARE

## 2022-09-13 ENCOUNTER — CARE COORDINATION (OUTPATIENT)
Dept: CASE MANAGEMENT | Age: 87
End: 2022-09-13

## 2022-09-13 VITALS
SYSTOLIC BLOOD PRESSURE: 128 MMHG | BODY MASS INDEX: 25.34 KG/M2 | WEIGHT: 177 LBS | OXYGEN SATURATION: 99 % | DIASTOLIC BLOOD PRESSURE: 62 MMHG | HEIGHT: 70 IN | HEART RATE: 60 BPM

## 2022-09-13 DIAGNOSIS — L98.9 SKIN LESION: Primary | ICD-10-CM

## 2022-09-13 DIAGNOSIS — N18.32 STAGE 3B CHRONIC KIDNEY DISEASE (HCC): ICD-10-CM

## 2022-09-13 PROCEDURE — G8417 CALC BMI ABV UP PARAM F/U: HCPCS | Performed by: PHYSICIAN ASSISTANT

## 2022-09-13 PROCEDURE — 1036F TOBACCO NON-USER: CPT | Performed by: PHYSICIAN ASSISTANT

## 2022-09-13 PROCEDURE — 1111F DSCHRG MED/CURRENT MED MERGE: CPT | Performed by: PHYSICIAN ASSISTANT

## 2022-09-13 PROCEDURE — 99212 OFFICE O/P EST SF 10 MIN: CPT | Performed by: PHYSICIAN ASSISTANT

## 2022-09-13 PROCEDURE — G8427 DOCREV CUR MEDS BY ELIG CLIN: HCPCS | Performed by: PHYSICIAN ASSISTANT

## 2022-09-13 PROCEDURE — 1123F ACP DISCUSS/DSCN MKR DOCD: CPT | Performed by: PHYSICIAN ASSISTANT

## 2022-09-13 ASSESSMENT — ENCOUNTER SYMPTOMS
EYES NEGATIVE: 1
RESPIRATORY NEGATIVE: 1
ABDOMINAL PAIN: 0
COLOR CHANGE: 0

## 2022-09-13 NOTE — CARE COORDINATION
[facility] Transitions Follow Up Call    2022    Patient: Uriel Adhikari  Patient : 1935   MRN: <[id number]>  Reason for Admission: AFIB with RVR  Discharge Date: 22 RARS: Readmission Risk Score: 15.4         Spoke with: Subsequent transitional call. No answer. Left HIPPA compliant message to return call to this writer. SEEN BY PCP 22. SEEN BY CARDIOLOGY DR Jorge Rubio 22    Care Transitions Subsequent and Final Call    Subsequent and Final Calls  Care Transitions Interventions  Other Interventions:              Follow Up  Future Appointments   Date Time Provider Department Center   2022 11:50 AM SANAM Wall - CNP AFL [facility] None   2022  3:30 PM MIRELLA Giron [facility]   10/19/2022  9:30 AM MD STANTON Bhatti [facility]   2022  9:00 AM MIRELLA Giron [facility]   2022 10:30 AM MD STANTON Bhatti [address] Care Transitions Nurse   [phone]

## 2022-09-13 NOTE — PROGRESS NOTES
Skin Spot Check    9/13/2022  Debbie Winston  1935  Chief Complaint   Patient presents with    Lesion(s)     Lesion on his back he needs looked at. Location: mid upper back  Duration: 1 month  Itch:  Yes  Bleed:  Yes  Growing:  No  History of skin cancer: Yes   AKs, SCC    Review of Systems   Constitutional:  Negative for chills, diaphoresis, fever and unexpected weight change. HENT: Negative. Negative for mouth sores. Eyes: Negative. Respiratory: Negative. Cardiovascular: Negative. Gastrointestinal:  Negative for abdominal pain. Musculoskeletal:  Negative for arthralgias and myalgias. Skin:  Negative for color change, pallor, rash and wound. Allergic/Immunologic: Negative for environmental allergies, food allergies and immunocompromised state. Hematological:  Negative for adenopathy.        Physical Exam  Back:   see image      ICD-10-CM    1. Skin lesion  L98.9           Use the Ointment bid x 2 weeks         [facility]

## 2022-09-15 ENCOUNTER — CARE COORDINATION (OUTPATIENT)
Dept: CASE MANAGEMENT | Age: 87
End: 2022-09-15

## 2022-09-15 NOTE — CARE COORDINATION
[facility] Transitions Follow Up Call    9/15/2022    Patient: Janis Charles  Patient : 1935   MRN: <[id number]>  Reason for Admission: Atrial fibrillation with RVR  Discharge Date: 22 RARS: Readmission Risk Score: 15.4         Spoke with: Subsequent transitional call. Spoke to :  Ed. Pt denies chest pain, dyspnea or palpitations. He still has ongoing fatigue. Appetite is good.  today. Takes all medications as directed. Discussed appt on 22. Pt states he is being treated for a lesion on his back. Pt was seen by Nephrology 22. SEEN BY PCP 22. SEEN BY CARDIOLOGY DR Michel Cruz 22  Final call. Care Transitions Follow Up Call    Needs to be reviewed by the provider   Additional needs identified to be addressed with provider: No  none             Method of communication with provider : none      LPN Care Coordinator contacted the patient by telephone to follow up after admission on 22. Verified name and  with patient as identifiers. Addressed changes since last contact: none  Discussed follow-up appointments. If no appointment was previously scheduled, appointment scheduling offered: Yes. Is follow up appointment scheduled within 7 days of discharge? Yes. Advance Care Planning:   Does patient have an Advance Directive: reviewed and current. LPN CC reviewed discharge instructions, medical action plan and red flags with patient and discussed any barriers to care and/or understanding of plan of care after discharge. Discussed appropriate site of care based on symptoms and resources available to patient including: PCP  Specialist  When to call [address] The patient agrees to contact the PCP office for questions related to their healthcare.      Patients top risk factors for readmission: medical condition-AFIB w RVR  Interventions to address risk factors: Obtained and reviewed discharge summary and/or continuity of care documents      Non-[facility] follow up appointment(s): n/a    LPN CC provided contact information for future needs. No further follow-up call indicated based on severity of symptoms and risk factors. Plan for next call:  n/a           Care Transitions Subsequent and Final Call    Subsequent and Final Calls  Care Transitions Interventions  Other Interventions:              Follow Up  Future Appointments   Date Time Provider Department Center   9/26/2022  3:30 PM Emi Nguyen [facility]   10/19/2022  9:30 AM Severino Oliva MD [facility]   11/22/2022  9:00 AM Rod Nyhan, [place] [facility]   12/2/2022 10:30 AM [facility]   1/11/2023 11:30 AM SANAM Kim - CNP [facility] None       DANIELLE Blue LPN Care Transitions Nurse   [phone]

## 2022-09-20 DIAGNOSIS — N18.32 STAGE 3B CHRONIC KIDNEY DISEASE (HCC): ICD-10-CM

## 2022-09-26 ENCOUNTER — PROCEDURE VISIT (OUTPATIENT)
Dept: PRIMARY CARE CLINIC | Age: 87
End: 2022-09-26
Payer: MEDICARE

## 2022-09-26 ENCOUNTER — HOSPITAL ENCOUNTER (OUTPATIENT)
Age: 87
Setting detail: SPECIMEN
Discharge: HOME | End: 2022-09-26

## 2022-09-26 VITALS
OXYGEN SATURATION: 99 % | BODY MASS INDEX: 26.57 KG/M2 | SYSTOLIC BLOOD PRESSURE: 124 MMHG | HEIGHT: 70 IN | DIASTOLIC BLOOD PRESSURE: 70 MMHG | HEART RATE: 73 BPM | WEIGHT: 185.6 LBS

## 2022-09-26 DIAGNOSIS — Z23 NEED FOR VACCINATION: ICD-10-CM

## 2022-09-26 DIAGNOSIS — D48.5 NEOPLASM OF UNCERTAIN BEHAVIOR OF SKIN: Primary | ICD-10-CM

## 2022-09-26 PROCEDURE — G0008 ADMIN INFLUENZA VIRUS VAC: HCPCS | Performed by: PHYSICIAN ASSISTANT

## 2022-09-26 PROCEDURE — 90694 VACC AIIV4 NO PRSRV 0.5ML IM: CPT | Performed by: PHYSICIAN ASSISTANT

## 2022-09-26 ASSESSMENT — PATIENT HEALTH QUESTIONNAIRE - PHQ9
SUM OF ALL RESPONSES TO PHQ QUESTIONS 1-9: 0
SUM OF ALL RESPONSES TO PHQ QUESTIONS 1-9: 0
2. FEELING DOWN, DEPRESSED OR HOPELESS: 0
SUM OF ALL RESPONSES TO PHQ9 QUESTIONS 1 & 2: 0
SUM OF ALL RESPONSES TO PHQ QUESTIONS 1-9: 0
SUM OF ALL RESPONSES TO PHQ QUESTIONS 1-9: 0
1. LITTLE INTEREST OR PLEASURE IN DOING THINGS: 0

## 2022-09-26 NOTE — PROGRESS NOTES
Skin Biopsy Procedure Note  9/26/2022  [facility]  1935    /70   Pulse 73   Ht 5' 10\" (1.778 m)   Wt 185 lb 9.6 oz (84.2 kg)   SpO2 99%   BMI 26.63 kg/m²   VITALS REVIEWED    Allergies   Allergen Reactions    Sulfa Antibiotics Anaphylaxis       Chief Complaint   Patient presents with    Follow-up     Pt here today for 2 week f/u for lesions on mid-back. Possible shave bx       Lesion:  1. Midback         Indication for Biopsy 1: non healing lesion      Procedure: The lesion(s) was cleansed with Alcohol.  2% lidocaine with epinephrine was used for local anaesthesia. A 1.5 cm  shave was used to obtain a specimen. The specimen(s) was    preserved and sent for pathological examination. The biopsy site(s) was  cleansed and hemostasis using ACl and a pressure dressing(s) was achieved with good results. The patient was instructed on wound care. No complications occurred and the patient tolerated the procedure well. Diagnosis Orders   1. Neoplasm of uncertain behavior of skin        2. Need for vaccination            Follow Up: Wound care discussed. Keep well moisturized. Watch for signs of infection which would include:  Redness, swelling, fever. If signs appear, please return to office. Return for Will call with results.        Electronically signed by Brady Pavon PA-C on 9/26/2022 at 3:38 PM

## 2022-09-28 LAB — DERMATOLOGY PATHOLOGY REPORT: NORMAL

## 2022-10-05 DIAGNOSIS — C44.519 BCC (BASAL CELL CARCINOMA), BACK: Primary | ICD-10-CM

## 2022-10-12 DIAGNOSIS — F41.9 ANXIETY: Primary | ICD-10-CM

## 2022-10-12 RX ORDER — LORAZEPAM 0.5 MG/1
0.5 TABLET ORAL EVERY 6 HOURS PRN
Qty: 60 TABLET | Refills: 0 | Status: SHIPPED | OUTPATIENT
Start: 2022-10-12 | End: 2022-11-11

## 2022-10-12 NOTE — TELEPHONE ENCOUNTER
LAST VISIT:   9/26/2022     Future Appointments   Date Time Provider Elina Rebollaristi   11/22/2022  9:00 AM Genesis Abraham [facility]   12/8/2022 10:30 AM MD STANTON Dubon  [facility]   1/11/2023 11:30 AM SANAM Hua - CNP [facility] None

## 2022-10-31 ENCOUNTER — OFFICE VISIT (OUTPATIENT)
Dept: PRIMARY CARE CLINIC | Age: 87
End: 2022-10-31
Payer: MEDICARE

## 2022-10-31 VITALS
DIASTOLIC BLOOD PRESSURE: 70 MMHG | OXYGEN SATURATION: 98 % | BODY MASS INDEX: 25.94 KG/M2 | SYSTOLIC BLOOD PRESSURE: 136 MMHG | WEIGHT: 180.8 LBS | HEART RATE: 55 BPM

## 2022-10-31 DIAGNOSIS — E83.42 HYPOMAGNESEMIA: ICD-10-CM

## 2022-10-31 DIAGNOSIS — R19.7 DIARRHEA, UNSPECIFIED TYPE: Primary | ICD-10-CM

## 2022-10-31 DIAGNOSIS — I50.9 ACUTE ON CHRONIC CONGESTIVE HEART FAILURE, UNSPECIFIED HEART FAILURE TYPE (HCC): ICD-10-CM

## 2022-10-31 PROCEDURE — G8417 CALC BMI ABV UP PARAM F/U: HCPCS | Performed by: FAMILY MEDICINE

## 2022-10-31 PROCEDURE — 99213 OFFICE O/P EST LOW 20 MIN: CPT | Performed by: FAMILY MEDICINE

## 2022-10-31 PROCEDURE — G8484 FLU IMMUNIZE NO ADMIN: HCPCS | Performed by: FAMILY MEDICINE

## 2022-10-31 PROCEDURE — G8427 DOCREV CUR MEDS BY ELIG CLIN: HCPCS | Performed by: FAMILY MEDICINE

## 2022-10-31 PROCEDURE — 1123F ACP DISCUSS/DSCN MKR DOCD: CPT | Performed by: FAMILY MEDICINE

## 2022-10-31 PROCEDURE — 1036F TOBACCO NON-USER: CPT | Performed by: FAMILY MEDICINE

## 2022-10-31 RX ORDER — FLUDROCORTISONE ACETATE 0.1 MG/1
TABLET ORAL DAILY
COMMUNITY

## 2022-10-31 RX ORDER — METOLAZONE 2.5 MG/1
TABLET ORAL
Qty: 30 TABLET | Refills: 3 | Status: SHIPPED | OUTPATIENT
Start: 2022-10-31 | End: 2022-11-09

## 2022-10-31 RX ORDER — VALSARTAN 40 MG/1
TABLET ORAL
COMMUNITY
Start: 2022-10-24

## 2022-10-31 ASSESSMENT — ENCOUNTER SYMPTOMS
SHORTNESS OF BREATH: 0
DIARRHEA: 1
COUGH: 0

## 2022-10-31 NOTE — PROGRESS NOTES
[address] [facility] PRIMARY CARE  [address] [facility] [address]  Dept: [phone]    Jose Andino is a 80 y.o. male Established patient, who presents today for his medical conditions/complaints as noted below. Chief Complaint   Patient presents with    Diarrhea     Patient has had diarrhea since last Thursday. He is having about 5-6 times daily. Patient has been using imodium. He went to [facility] on Saturday. No blood noticed. HPI:     HPI- started about 5-6 days ago with diarrhea. Started on Thursday and went to [facility] on Saturday. They gave him some fluids and Mg and Potassium as those were low. Had med changes from a fib episode in sept. Started having swelling in  and cardiology sent him to heart failure clinic at . They increased his diuretic and was also concerned about the reaction to sulfa (cross reaction with lasix)  Stopped the lasix and then restarted today and restarted 40 mg furosemide . Still went at 5, 8, 11, and then took imodium today about 1 today. No BM since. Weight went from 177-186 in a couple of weeks and then with all the diarrhea went down to 179. No SOB. Some abd pain when has to have BM.           Reviewed prior notes: None   Reviewed previous:  Labs    LDL Cholesterol (mg/dL)   Date Value   01/26/2018 39   10/05/2015 51     LDL Calculated (mg/dL)   Date Value   02/08/2021 51   03/06/2018 54   10/18/2016 70       (goal LDL is <100)   AST (U/L)   Date Value   08/23/2022 22     ALT (U/L)   Date Value   08/23/2022 12     BUN (mg/dL)   Date Value   09/20/2022 20   09/20/2022 20     Hemoglobin A1C (%)   Date Value   09/01/2022 7.0     TSH (mIU/L)   Date Value   10/04/2015 2.99     BP Readings from Last 3 Encounters:   10/31/22 136/70   09/26/22 124/70   09/14/22 (!) 147/77          (goal 120/80)    Past Medical History:   Diagnosis Date    Acute myocardial ischemia ([address] 12/11/2015    Acute sinusitis 12/11/2015 Cerebral artery occlusion with cerebral infarction ([facility] [address]     Chest pain 10/5/2015    Diabetes mellitus ([facility] [address]     H/O cataract     Hyperlipidemia     Hypertension     MI (myocardial infarction) ([facility] [address] 1985    Neck stiffness 12/11/2015    Partial small bowel obstruction ([facility] [address] 11/7/2019    Peyronie's disease     Rotator cuff tendonitis 12/11/2015    TIA (transient ischemic attack) 1998      Past Surgical History:   Procedure Laterality Date    CARDIAC CATHETERIZATION      mulitple caths- 5 stents total    CARDIAC SURGERY  01/2018    3 stents    CATARACT REMOVAL WITH IMPLANT Left     SKIN BIOPSY      forehead       Family History   Problem Relation Age of Onset    Cancer Mother         oral    Stroke Father     High Blood Pressure Brother     Heart Disease Brother     Cancer Maternal Grandmother     Diabetes type 2  Son     Heart Attack Son     Heart Disease Maternal Grandfather     Stroke Paternal Grandmother     Heart Disease Paternal Grandfather         ? Social History     Tobacco Use    Smoking status: Former     Packs/day: 0.50     Years: 8.00     Pack years: 4.00     Types: Cigarettes    Smokeless tobacco: Never   Substance Use Topics    Alcohol use: No      Current Outpatient Medications   Medication Sig Dispense Refill    valsartan (DIOVAN) 40 MG tablet       rivaroxaban (XARELTO) 15 MG TABS tablet Take 15 mg by mouth      fludrocortisone (FLORINEF) 0.1 MG tablet Take by mouth daily      metOLazone (ZAROXOLYN) 2.5 MG tablet Take 1 tablet three days a week. 30 tablet 3    LORazepam (ATIVAN) 0.5 MG tablet Take 1 tablet by mouth every 6 hours as needed for Anxiety for up to 30 days.  60 tablet 0    amiodarone (CORDARONE) 200 MG tablet Take 1 tablet by mouth daily 90 tablet 0    pantoprazole (PROTONIX) 40 MG tablet Take 1 tablet by mouth 2 times daily (before meals) 180 tablet 3    ULTICARE SHORT PEN NEEDLES 31G X 8 MM MISC INJECT 1 NEEDLE INTO THE SKIN DAILY 100 each 3    Accu-Chek FastClix Lancets MISC USE ONE LANCET TO TEST TWICE A DAY AND AS NEEDED 102 each 3    blood glucose test strips (FREESTYLE TEST STRIPS) strip USE ONE STRIP TO TEST TWICE A DAY AND AS NEEDED 150 strip 1    atorvastatin (LIPITOR) 80 MG tablet TAKE 1 TABLET NIGHTLY 90 tablet 3    clopidogrel (PLAVIX) 75 MG tablet TAKE 1 TABLET DAILY 90 tablet 3    LANTUS SOLOSTAR 100 UNIT/ML injection pen INJECT 15 UNITS INTO THE SKIN NIGHTLY 12 mL 2    amLODIPine (NORVASC) 5 MG tablet TAKE 1 TABLET DAILY 90 tablet 3    Blood Glucose Monitoring Suppl (FREESTYLE FREEDOM LITE) w/Device KIT 1 kit by Does not apply route daily as needed (check sugars bid and prn) 1 kit 0    acetaminophen (TYLENOL) 500 MG tablet Take 500 mg by mouth every 6 hours as needed for Pain      Lancet Device MISC 1 Device by Does not apply route once for 1 dose 1 Device 0    nitroGLYCERIN (NITROSTAT) 0.4 MG SL tablet Place 1 tablet under the tongue every 5 minutes as needed for Chest pain 25 tablet 1    Coenzyme Q10 (CO Q-10) 30 MG CAPS Take 30 mg by mouth daily      Ascorbic Acid (VITAMIN C) 250 MG tablet Take 500 mg by mouth daily      ferrous sulfate 325 (65 FE) MG tablet Take 325 mg by mouth daily       mupirocin (BACTROBAN) 2 % ointment Apply topically 3 times daily. 22 g 0    XARELTO 20 MG TABS tablet TAKE 1 TABLET DAILY (Patient not taking: Reported on 10/31/2022) 90 tablet 3     No current facility-administered medications for this visit.      Allergies   Allergen Reactions    Sulfa Antibiotics Anaphylaxis       Health Maintenance   Topic Date Due    DTaP/Tdap/Td vaccine (1 - Tdap) Never done    Shingles vaccine (1 of 2) Never done    Lipids  02/08/2022    COVID-19 Vaccine (5 - Booster for Chaves Peter series) 06/07/2022    Annual Wellness Visit (AWV)  02/22/2023    Depression Screen  09/26/2023    Flu vaccine  Completed    Pneumococcal 65+ years Vaccine  Completed    Hepatitis A vaccine  Aged Out    Hib vaccine  Aged Out    Meningococcal (ACWY) vaccine  Aged Out       Subjective: Review of Systems   Constitutional:  Positive for chills (occas). Negative for fever. HENT:  Negative for congestion. Respiratory:  Negative for cough and shortness of breath. Cardiovascular:  Negative for chest pain and palpitations. Gastrointestinal:  Positive for diarrhea. Skin:  Positive for rash. Objective:     /70   Pulse 55   Wt 180 lb 12.8 oz (82 kg)   SpO2 98%   BMI 25.94 kg/m²   Physical Exam  Vitals and nursing note reviewed. Constitutional:       General: He is not in acute distress. Appearance: He is well-developed. He is not ill-appearing. HENT:      Head: Normocephalic and atraumatic. Right Ear: External ear normal.      Left Ear: External ear normal.   Eyes:      General: No scleral icterus. Right eye: No discharge. Left eye: No discharge. Conjunctiva/sclera: Conjunctivae normal.   Neck:      Thyroid: No thyromegaly. Trachea: No tracheal deviation. Cardiovascular:      Rate and Rhythm: Normal rate and regular rhythm. Heart sounds: Normal heart sounds. Pulmonary:      Effort: Pulmonary effort is normal. No respiratory distress. Breath sounds: Normal breath sounds. No wheezing. Lymphadenopathy:      Cervical: No cervical adenopathy. Skin:     General: Skin is warm. Findings: No rash. Neurological:      Mental Status: He is alert and oriented to person, place, and time. Psychiatric:         Mood and Affect: Mood normal.         Behavior: Behavior normal.         Thought Content: Thought content normal.       Assessment/Plan:   1. Hypomagnesemia  Comments:  recheck labs. Orders:  -     Basic Metabolic Panel; Future  -     Magnesium; Future  2. Diarrhea, unspecified type  Comments:  allergic reaction to increased lasix or just side effect?  will stop for a couple more days. continue imodium and then restart at 20 mg daily. add zarox. Orders:  -     Basic Metabolic Panel; Future  -     Magnesium; Future  3. Acute on chronic congestive heart failure, unspecified heart failure type ([facility] [address]  Comments:  as above     No follow-ups on file. Keep current appt. Orders Placed This Encounter   Procedures    Basic Metabolic Panel     Standing Status:   Future     Standing Expiration Date:   11/1/2023    Magnesium     Standing Status:   Future     Standing Expiration Date:   10/31/2023     Orders Placed This Encounter   Medications    metOLazone (ZAROXOLYN) 2.5 MG tablet     Sig: Take 1 tablet three days a week. Dispense:  30 tablet     Refill:  3       Patient given educational materials - see patient instructions. Discussed use, benefit, and side effects of prescribed medications. All patient questions answered. Pt voiced understanding. Reviewed health maintenance. Instructed to continue current medications, diet and exercise. Patient agreed with treatment plan. Follow up as directed.      Electronically signed by Elyssa Phillips MD on 10/31/2022 at 3:43 PM

## 2022-10-31 NOTE — PATIENT INSTRUCTIONS
Stop furosemide for 2 more days then restart at 20 mg daily. Start metolazone 2.5 mg three times a week.   Get labs done today and then again next week at HF clinic

## 2022-11-02 LAB
BUN BLDV-MCNC: NORMAL MG/DL
CALCIUM SERPL-MCNC: NORMAL MG/DL
CHLORIDE BLD-SCNC: NORMAL MMOL/L
CO2: NORMAL
CREAT SERPL-MCNC: NORMAL MG/DL
GFR CALCULATED: NORMAL
GLUCOSE BLD-MCNC: NORMAL MG/DL
MAGNESIUM: NORMAL
POTASSIUM SERPL-SCNC: NORMAL MMOL/L
SODIUM BLD-SCNC: NORMAL MMOL/L

## 2022-11-03 DIAGNOSIS — E83.42 HYPOMAGNESEMIA: ICD-10-CM

## 2022-11-03 DIAGNOSIS — R19.7 DIARRHEA, UNSPECIFIED TYPE: ICD-10-CM

## 2022-11-03 RX ORDER — POTASSIUM CHLORIDE 20 MEQ/1
20 TABLET, EXTENDED RELEASE ORAL DAILY
Qty: 10 TABLET | Refills: 0 | Status: SHIPPED | OUTPATIENT
Start: 2022-11-03 | End: 2022-11-09

## 2022-11-03 RX ORDER — MAGNESIUM OXIDE 400 MG/1
400 TABLET ORAL DAILY
Qty: 10 TABLET | Refills: 0 | Status: SHIPPED | OUTPATIENT
Start: 2022-11-03

## 2022-11-03 NOTE — RESULT ENCOUNTER NOTE
Advise pt potassium and magnesium are still low.   I am sending Rx for both that he should take for the next week

## 2022-11-07 ENCOUNTER — TELEPHONE (OUTPATIENT)
Dept: PRIMARY CARE CLINIC | Age: 87
End: 2022-11-07

## 2022-11-07 NOTE — TELEPHONE ENCOUNTER
[facility] Trans[facility] Initial Follow Up Call    Outreach made within 2 business days of discharge: Yes    Patient: John Soliman Patient : 1935   MRN: [id number]  Reason for Admission: There are no discharge diagnoses documented for the most recent discharge. Discharge Date: 22       Spoke with: OMEGA    Discharge department/facility: [facility] Interactive Patient Contact:  Was patient able to fill all prescriptions: Yes  Was patient instructed to bring all medications to the follow-up visit: Yes  Is patient taking all medications as directed in the discharge summary?  Yes  Does patient understand their discharge instructions: Yes  Does patient have questions or concerns that need addressed prior to 7-14 day follow up office visit: no    Scheduled appointment with PCP within 7-14 days    Follow Up  Future Appointments   Date Time Provider Elina Perla   2022 10:30 AM [facility]   2022  9:00 AM [facility]   2022 10:30 AM [facility]   2023 11:30 AM Arelis Sanchez, [address] [place], [place] [address]th [facility]       Jazmyn Bernal MA

## 2022-11-09 ENCOUNTER — OFFICE VISIT (OUTPATIENT)
Dept: PRIMARY CARE CLINIC | Age: 87
End: 2022-11-09
Payer: MEDICARE

## 2022-11-09 VITALS
BODY MASS INDEX: 26.52 KG/M2 | OXYGEN SATURATION: 98 % | WEIGHT: 184.8 LBS | HEART RATE: 66 BPM | DIASTOLIC BLOOD PRESSURE: 56 MMHG | SYSTOLIC BLOOD PRESSURE: 120 MMHG

## 2022-11-09 DIAGNOSIS — R19.7 DIARRHEA, UNSPECIFIED TYPE: ICD-10-CM

## 2022-11-09 DIAGNOSIS — Z09 HOSPITAL DISCHARGE FOLLOW-UP: ICD-10-CM

## 2022-11-09 DIAGNOSIS — I48.91 ATRIAL FIBRILLATION WITH RVR (HCC): Primary | ICD-10-CM

## 2022-11-09 DIAGNOSIS — I50.9 ACUTE ON CHRONIC CONGESTIVE HEART FAILURE, UNSPECIFIED HEART FAILURE TYPE (HCC): ICD-10-CM

## 2022-11-09 PROCEDURE — G8427 DOCREV CUR MEDS BY ELIG CLIN: HCPCS | Performed by: FAMILY MEDICINE

## 2022-11-09 PROCEDURE — G8484 FLU IMMUNIZE NO ADMIN: HCPCS | Performed by: FAMILY MEDICINE

## 2022-11-09 PROCEDURE — 99213 OFFICE O/P EST LOW 20 MIN: CPT | Performed by: FAMILY MEDICINE

## 2022-11-09 PROCEDURE — 1036F TOBACCO NON-USER: CPT | Performed by: FAMILY MEDICINE

## 2022-11-09 PROCEDURE — 1123F ACP DISCUSS/DSCN MKR DOCD: CPT | Performed by: FAMILY MEDICINE

## 2022-11-09 PROCEDURE — 1111F DSCHRG MED/CURRENT MED MERGE: CPT | Performed by: FAMILY MEDICINE

## 2022-11-09 PROCEDURE — G8417 CALC BMI ABV UP PARAM F/U: HCPCS | Performed by: FAMILY MEDICINE

## 2022-11-09 RX ORDER — FUROSEMIDE 40 MG/1
TABLET ORAL
COMMUNITY
Start: 2022-10-26 | End: 2022-11-09 | Stop reason: SDUPTHER

## 2022-11-09 RX ORDER — CLOPIDOGREL BISULFATE 75 MG/1
75 TABLET ORAL DAILY
COMMUNITY

## 2022-11-09 RX ORDER — FUROSEMIDE 40 MG/1
20 TABLET ORAL DAILY
Qty: 60 TABLET | Refills: 3
Start: 2022-11-09

## 2022-11-09 NOTE — PROGRESS NOTES
Post-Discharge Transitional Care Management Progress Note      Cecile Jj   YOB: 1935    Date of Office Visit:  11/9/2022  Date of Hospital Admission: 11/4/22  Date of Hospital Discharge: 11/5/22    Care management risk score Rising risk (score 2-5) and Complex Care (Scores >=6): No Risk Score On File     Non face to face  following discharge, date last encounter closed (first attempt may have been earlier): 11/07/2022 11/07/2022    Call initiated 2 business days of discharge: Yes    ASSESSMENT/PLAN:   Atrial fibrillation with RVR (HCC)  -     Handicap placard  -     Basic Metabolic Panel; Future  -     Magnesium; Future  Diarrhea, unspecified type  Acute on chronic congestive heart failure, unspecified heart failure type ([address]  -     Handicap placard    Medical Decision Making: moderate complexity  Return in about 3 months (around 2/9/2023). On this date 11/9/2022 I have spent 20 minutes reviewing previous notes, test results and face to face with the patient discussing the diagnosis and importance of compliance with the treatment plan as well as documenting on the day of the visit. Subjective:   HPI:  Follow up of Hospital problems/diagnosis(es): pt admitted to hospital due to labs being off. Kidney function was worse and mg and potassium was low. Inpatient course: Discharge summary reviewed- see chart. Interval history/Current status: Diarrhea - none since Friday. Taking only the 20 mg lasix. Stopped metolazone and high dose lasis. Sees cardiology next week. Occas SOB, but mostly with exertion but not bad. Still having swelling. Wears compression socks, but makes his knees swell and can hardly walk. Some days worse than others.         Patient Active Problem List   Diagnosis    Controlled type 2 diabetes with neuropathy [facility])    Essential hypertension    CAD (coronary artery disease)    Bradycardia    Demand ischemia (HCC)    Unstable angina (HCC)    Anxiety    Chronic rhinitis    Diabetic retinopathy (HCC)    Dizziness    Hyperlipidemia    Insomnia    Lumbar spondylolysis    Stroke syndrome    AK (actinic keratosis)    NSTEMI (non-ST elevated myocardial infarction) ([facility])    Chronic sinusitis    Anemia    Arthritis    Paroxysmal atrial fibrillation (HCC)    S/P skin biopsy    Squamous cell carcinoma in situ    Atrial fibrillation with RVR ([facility])    Lactic acid acidosis    CKD (chronic kidney disease) stage 3, GFR 30-59 ml/min ([facility])    Orthostatic hypotension    RENE (acute kidney injury) ([facility] [address]    Hypomagnesemia    Type 2 diabetes mellitus with chronic kidney disease       Medications listed as ordered at the time of discharge from hospital     Medication List            Accurate as of November 9, 2022 11:14 AM. If you have any questions, ask your nurse or doctor.                 CHANGE how you take these medications      furosemide 40 MG tablet  Commonly known as: LASIX  Take 0.5 tablets by mouth daily  What changed:   how much to take  how to take this  when to take this  Changed by: Rick Hernandez MD            CONTINUE taking these medications      Accu-Chek FastClix Lancets [facility]  USE ONE LANCET TO TEST TWICE A DAY AND AS NEEDED     acetaminophen 500 MG tablet  Commonly known as: TYLENOL     amiodarone 200 MG tablet  Commonly known as: CORDARONE  Take 1 tablet by mouth daily     amLODIPine 5 MG tablet  Commonly known as: NORVASC  TAKE 1 TABLET DAILY     atorvastatin 80 MG tablet  Commonly known as: LIPITOR  TAKE 1 TABLET NIGHTLY     * clopidogrel 75 MG tablet  Commonly known as: PLAVIX     * clopidogrel 75 MG tablet  Commonly known as: PLAVIX  TAKE 1 TABLET DAILY     Co Q-10 30 MG Caps     ferrous sulfate 325 (65 Fe) MG tablet  Commonly known as: IRON 325     fludrocortisone 0.1 MG tablet  Commonly known as: FLORINEF     FreeStyle Freedom Lite w/Device Kit  1 kit by Does not apply route daily as needed (check sugars bid and prn)     FREESTYLE TEST STRIPS strip  Generic drug: blood glucose test strips  USE ONE STRIP TO TEST TWICE A DAY AND AS NEEDED     Lancet Device Misc  1 Device by Does not apply route once for 1 dose     Lantus SoloStar 100 UNIT/ML injection pen  Generic drug: insulin glargine  INJECT 15 UNITS INTO THE SKIN NIGHTLY     LORazepam 0.5 MG tablet  Commonly known as: ATIVAN  Take 1 tablet by mouth every 6 hours as needed for Anxiety for up to 30 days. magnesium oxide 400 MG tablet  Commonly known as: MAG-OX  Take 1 tablet by mouth daily     nitroGLYCERIN 0.4 MG SL tablet  Commonly known as: NITROSTAT  Place 1 tablet under the tongue every 5 minutes as needed for Chest pain     pantoprazole 40 MG tablet  Commonly known as: PROTONIX  Take 1 tablet by mouth 2 times daily (before meals)     rivaroxaban 15 MG Tabs tablet  Commonly known as: XARELTO     UltiCare Short Pen Needles 31G X 8 MM Misc  Generic drug: Insulin Pen Needle  INJECT 1 NEEDLE INTO THE SKIN DAILY     valsartan 40 MG tablet  Commonly known as: DIOVAN     vitamin C 250 MG tablet           * This list has 2 medication(s) that are the same as other medications prescribed for you. Read the directions carefully, and ask your doctor or other care provider to review them with you.                    Where to Get Your Medications        Information about where to get these medications is not yet available    Ask your nurse or doctor about these medications  furosemide 40 MG tablet           Medications marked \"taking\" at this time  Outpatient Medications Marked as Taking for the 11/9/22 encounter (Office Visit) with Pancho Montiel MD   Medication Sig Dispense Refill    clopidogrel (PLAVIX) 75 MG tablet Take 75 mg by mouth daily      furosemide (LASIX) 40 MG tablet Take 0.5 tablets by mouth daily 60 tablet 3    magnesium oxide (MAG-OX) 400 MG tablet Take 1 tablet by mouth daily 10 tablet 0    valsartan (DIOVAN) 40 MG tablet       rivaroxaban (XARELTO) 15 MG TABS tablet Take 15 mg by mouth fludrocortisone (FLORINEF) 0.1 MG tablet Take by mouth daily      LORazepam (ATIVAN) 0.5 MG tablet Take 1 tablet by mouth every 6 hours as needed for Anxiety for up to 30 days. 60 tablet 0    amiodarone (CORDARONE) 200 MG tablet Take 1 tablet by mouth daily 90 tablet 0    pantoprazole (PROTONIX) 40 MG tablet Take 1 tablet by mouth 2 times daily (before meals) 180 tablet 3    ULTICARE SHORT PEN NEEDLES 31G X 8 MM MISC INJECT 1 NEEDLE INTO THE SKIN DAILY 100 each 3    Accu-Chek FastClix Lancets MISC USE ONE LANCET TO TEST TWICE A DAY AND AS NEEDED 102 each 3    blood glucose test strips (FREESTYLE TEST STRIPS) strip USE ONE STRIP TO TEST TWICE A DAY AND AS NEEDED 150 strip 1    atorvastatin (LIPITOR) 80 MG tablet TAKE 1 TABLET NIGHTLY 90 tablet 3    clopidogrel (PLAVIX) 75 MG tablet TAKE 1 TABLET DAILY 90 tablet 3    LANTUS SOLOSTAR 100 UNIT/ML injection pen INJECT 15 UNITS INTO THE SKIN NIGHTLY 12 mL 2    amLODIPine (NORVASC) 5 MG tablet TAKE 1 TABLET DAILY 90 tablet 3    Blood Glucose Monitoring Suppl (FREESTYLE FREEDOM LITE) w/Device KIT 1 kit by Does not apply route daily as needed (check sugars bid and prn) 1 kit 0    acetaminophen (TYLENOL) 500 MG tablet Take 500 mg by mouth every 6 hours as needed for Pain      Lancet Device MISC 1 Device by Does not apply route once for 1 dose 1 Device 0    nitroGLYCERIN (NITROSTAT) 0.4 MG SL tablet Place 1 tablet under the tongue every 5 minutes as needed for Chest pain 25 tablet 1    Coenzyme Q10 (CO Q-10) 30 MG CAPS Take 30 mg by mouth daily      Ascorbic Acid (VITAMIN C) 250 MG tablet Take 500 mg by mouth daily      ferrous sulfate 325 (65 FE) MG tablet Take 325 mg by mouth daily           Medications patient taking as of now reconciled against medications ordered at time of hospital discharge: Yes    RoS as above    Objective:    BP (!) 120/56   Pulse 66   Wt 184 lb 12.8 oz (83.8 kg)   SpO2 98%   BMI 26.52 kg/m²   Physical Exam  Vitals and nursing note reviewed. Constitutional:       General: He is not in acute distress. Appearance: He is well-developed. He is not ill-appearing. HENT:      Head: Normocephalic and atraumatic. Right Ear: External ear normal.      Left Ear: External ear normal.   Eyes:      General: No scleral icterus. Right eye: No discharge. Left eye: No discharge. Conjunctiva/sclera: Conjunctivae normal.   Neck:      Thyroid: No thyromegaly. Trachea: No tracheal deviation. Cardiovascular:      Rate and Rhythm: Normal rate and regular rhythm. Heart sounds: Normal heart sounds. Pulmonary:      Effort: Pulmonary effort is normal. No respiratory distress. Breath sounds: Normal breath sounds. No wheezing. Lymphadenopathy:      Cervical: No cervical adenopathy. Skin:     General: Skin is warm. Findings: No rash. Neurological:      Mental Status: He is alert and oriented to person, place, and time. Psychiatric:         Mood and Affect: Mood normal.         Behavior: Behavior normal.         Thought Content: Thought content normal.        An electronic signature was used to authenticate this note.   --Jinny Mcrae MD

## 2022-11-11 RX ORDER — POTASSIUM CHLORIDE 1500 MG/1
TABLET, EXTENDED RELEASE ORAL
Qty: 10 TABLET | Refills: 0 | OUTPATIENT
Start: 2022-11-11

## 2022-11-11 NOTE — TELEPHONE ENCOUNTER
LAST VISIT:   11/9/2022     Future Appointments   Date Time Provider Elina Perla   12/8/2022 10:30 AM MD STANTON Garcia [id number]   1/11/2023 11:30 AM SANAM Montero - CNP [facility] None   2/10/2023 10:00 AM MD STANTON Garcia

## 2022-12-03 DIAGNOSIS — F41.9 ANXIETY: ICD-10-CM

## 2022-12-05 RX ORDER — LORAZEPAM 0.5 MG/1
TABLET ORAL
Qty: 60 TABLET | Refills: 0 | Status: SHIPPED | OUTPATIENT
Start: 2022-12-05 | End: 2023-01-04

## 2022-12-06 DIAGNOSIS — Z79.4 TYPE 2 DIABETES MELLITUS WITHOUT COMPLICATION, WITH LONG-TERM CURRENT USE OF INSULIN (HCC): ICD-10-CM

## 2022-12-06 DIAGNOSIS — E11.9 TYPE 2 DIABETES MELLITUS WITHOUT COMPLICATION, WITH LONG-TERM CURRENT USE OF INSULIN (HCC): ICD-10-CM

## 2022-12-06 RX ORDER — INSULIN GLARGINE 100 [IU]/ML
INJECTION, SOLUTION SUBCUTANEOUS
Qty: 12 ML | Refills: 2 | Status: SHIPPED | OUTPATIENT
Start: 2022-12-06

## 2022-12-08 ENCOUNTER — OFFICE VISIT (OUTPATIENT)
Dept: PRIMARY CARE CLINIC | Age: 87
End: 2022-12-08

## 2022-12-08 VITALS
HEIGHT: 70 IN | WEIGHT: 183.6 LBS | HEART RATE: 54 BPM | BODY MASS INDEX: 26.28 KG/M2 | SYSTOLIC BLOOD PRESSURE: 168 MMHG | OXYGEN SATURATION: 99 % | DIASTOLIC BLOOD PRESSURE: 74 MMHG

## 2022-12-08 DIAGNOSIS — E11.40 CONTROLLED TYPE 2 DIABETES WITH NEUROPATHY (HCC): ICD-10-CM

## 2022-12-08 DIAGNOSIS — N18.31 STAGE 3A CHRONIC KIDNEY DISEASE (HCC): ICD-10-CM

## 2022-12-08 DIAGNOSIS — I48.91 ATRIAL FIBRILLATION WITH RVR (HCC): ICD-10-CM

## 2022-12-08 LAB — HBA1C MFR BLD: 7.6 %

## 2022-12-08 RX ORDER — VALSARTAN 80 MG/1
80 TABLET ORAL DAILY
Qty: 90 TABLET | Refills: 3 | Status: SHIPPED | OUTPATIENT
Start: 2022-12-08

## 2022-12-08 ASSESSMENT — ENCOUNTER SYMPTOMS
EYE REDNESS: 0
RHINORRHEA: 0
VOMITING: 0
NAUSEA: 0
ABDOMINAL PAIN: 0
WHEEZING: 0
SORE THROAT: 0
SHORTNESS OF BREATH: 0
COUGH: 0
DIARRHEA: 0
EYE DISCHARGE: 0

## 2022-12-08 NOTE — PROGRESS NOTES
[facility] PRIMARY CARE  [address] [facility] [address]  Dept: [phone]    Yosvany Freeman is a 80 y.o. male Established patient, who presents today for his medical conditions/complaints as noted below  Chief Complaint   Patient presents with    Diabetes     Pt is here for a x3 month f/u. Last A1C: 7.0       HPI:     HPI  Sugars are up and down at home. No real lows for the past month. Usually around 80 for the low. Highs before meals are running 170. Cardiology stopped his amlodipine and swelling is about the same and BP has now gone up. Increased his lasix to 40 mg about 1-2 weeks. Weight is down 8 lbs and swelling is better.         Reviewed prior notes:  None  Reviewed previous:  Labs    Hemoglobin A1C (%)   Date Value   12/08/2022 7.6   09/01/2022 7.0   06/01/2022 7.2             ( goal A1C is < 7)   No results found for: LABMICR  LDL Cholesterol (mg/dL)   Date Value   01/26/2018 39   10/05/2015 51     LDL Calculated (mg/dL)   Date Value   02/08/2021 51   03/06/2018 54   10/18/2016 70       (goal LDL is <100)   AST (U/L)   Date Value   08/23/2022 22     ALT (U/L)   Date Value   08/23/2022 12     BUN (mg/dL)   Date Value   09/20/2022 20   09/20/2022 20     BP Readings from Last 3 Encounters:   12/08/22 (!) 168/74   11/09/22 (!) 120/56   10/31/22 136/70          (goal 140/90)    Past Medical History:   Diagnosis Date    Acute myocardial ischemia ([address] 12/11/2015    Acute sinusitis 12/11/2015    Cerebral artery occlusion with cerebral infarction [facility])     Chest pain 10/5/2015    Diabetes mellitus ([address]     H/O cataract     Hyperlipidemia     Hypertension     MI (myocardial infarction) ([address] 1985    Neck stiffness 12/11/2015    Partial small bowel obstruction ([address] 11/7/2019    Peyronie's disease     Rotator cuff tendonitis 12/11/2015    TIA (transient ischemic attack) 1998        Social History     Tobacco Use    Smoking status: Former     Packs/day: 0.50     Years: 8.00     Pack years: 4.00     Types: Cigarettes    Smokeless tobacco: Never   Substance Use Topics    Alcohol use: No      Current Outpatient Medications   Medication Sig Dispense Refill    valsartan (DIOVAN) 80 MG tablet Take 1 tablet by mouth daily 90 tablet 3    LANTUS SOLOSTAR 100 UNIT/ML injection pen INJECT 15 UNITS INTO THE SKIN ONCE NIGHTLY 12 mL 2    LORazepam (ATIVAN) 0.5 MG tablet TAKE ONE TABLET BY MOUTH EVERY 6 HOURS AS NEEDED FOR ANXIETY FOR UP TO 30 DAYS 60 tablet 0    clopidogrel (PLAVIX) 75 MG tablet Take 75 mg by mouth daily      furosemide (LASIX) 40 MG tablet Take 0.5 tablets by mouth daily 60 tablet 3    magnesium oxide (MAG-OX) 400 MG tablet Take 1 tablet by mouth daily 10 tablet 0    rivaroxaban (XARELTO) 15 MG TABS tablet Take 15 mg by mouth      fludrocortisone (FLORINEF) 0.1 MG tablet Take by mouth daily      amiodarone (CORDARONE) 200 MG tablet Take 1 tablet by mouth daily 90 tablet 0    pantoprazole (PROTONIX) 40 MG tablet Take 1 tablet by mouth 2 times daily (before meals) 180 tablet 3    ULTICARE SHORT PEN NEEDLES 31G X 8 MM MISC INJECT 1 NEEDLE INTO THE SKIN DAILY 100 each 3    Accu-Chek FastClix Lancets MISC USE ONE LANCET TO TEST TWICE A DAY AND AS NEEDED 102 each 3    blood glucose test strips (FREESTYLE TEST STRIPS) strip USE ONE STRIP TO TEST TWICE A DAY AND AS NEEDED 150 strip 1    atorvastatin (LIPITOR) 80 MG tablet TAKE 1 TABLET NIGHTLY 90 tablet 3    Blood Glucose Monitoring Suppl (FREESTYLE FREEDOM LITE) w/Device KIT 1 kit by Does not apply route daily as needed (check sugars bid and prn) 1 kit 0    acetaminophen (TYLENOL) 500 MG tablet Take 500 mg by mouth every 6 hours as needed for Pain      nitroGLYCERIN (NITROSTAT) 0.4 MG SL tablet Place 1 tablet under the tongue every 5 minutes as needed for Chest pain 25 tablet 1    Coenzyme Q10 (CO Q-10) 30 MG CAPS Take 30 mg by mouth daily      Ascorbic Acid (VITAMIN C) 250 MG tablet Take 500 mg by mouth daily ferrous sulfate 325 (65 FE) MG tablet Take 325 mg by mouth daily       clopidogrel (PLAVIX) 75 MG tablet TAKE 1 TABLET DAILY 90 tablet 3    amLODIPine (NORVASC) 5 MG tablet TAKE 1 TABLET DAILY (Patient not taking: Reported on 12/8/2022) 90 tablet 3    Lancet Device MISC 1 Device by Does not apply route once for 1 dose 1 Device 0     No current facility-administered medications for this visit. Allergies   Allergen Reactions    Sulfa Antibiotics Anaphylaxis       Health Maintenance   Topic Date Due    DTaP/Tdap/Td vaccine (1 - Tdap) Never done    Shingles vaccine (1 of 2) Never done    Lipids  02/08/2022    COVID-19 Vaccine (5 - Booster for Pfizer series) 06/07/2022    Depression Screen  09/26/2023    Flu vaccine  Completed    Pneumococcal 65+ years Vaccine  Completed    Hepatitis A vaccine  Aged Out    Hib vaccine  Aged Out    Meningococcal (ACWY) vaccine  Aged Out       Subjective:     Review of Systems   Constitutional:  Negative for chills and fever. HENT:  Negative for rhinorrhea and sore throat. Eyes:  Negative for discharge and redness. Respiratory:  Negative for cough, shortness of breath and wheezing. Cardiovascular:  Negative for chest pain and palpitations. Gastrointestinal:  Negative for abdominal pain, diarrhea, nausea and vomiting. Genitourinary:  Negative for dysuria and frequency. Musculoskeletal:  Negative for arthralgias and myalgias. Neurological:  Negative for dizziness, light-headedness and headaches. Psychiatric/Behavioral:  Negative for sleep disturbance. Objective:     BP (!) 168/74   Pulse 54   Ht 5' 10\" (1.778 m)   Wt 183 lb 9.6 oz (83.3 kg)   SpO2 99%   BMI 26.34 kg/m²   Physical Exam  Vitals and nursing note reviewed. Constitutional:       General: He is not in acute distress. Appearance: He is well-developed. He is not ill-appearing. HENT:      Head: Normocephalic and atraumatic.       Right Ear: External ear normal.      Left Ear: External ear normal.   Eyes:      General: No scleral icterus. Right eye: No discharge. Left eye: No discharge. Conjunctiva/sclera: Conjunctivae normal.   Neck:      Thyroid: No thyromegaly. Trachea: No tracheal deviation. Cardiovascular:      Rate and Rhythm: Normal rate and regular rhythm. Heart sounds: Normal heart sounds. Pulmonary:      Effort: Pulmonary effort is normal. No respiratory distress. Breath sounds: Normal breath sounds. No wheezing. Lymphadenopathy:      Cervical: No cervical adenopathy. Skin:     General: Skin is warm. Findings: No rash. Neurological:      Mental Status: He is alert and oriented to person, place, and time. Psychiatric:         Mood and Affect: Mood normal.         Behavior: Behavior normal.         Thought Content: Thought content normal.       Assessment/Plan:   1. Controlled type 2 diabetes with neuropathy ([facility] [address]  Assessment & Plan:   Well-controlled, continue current medications  Orders:  -     POCT glycosylated hemoglobin (Hb A1C)  2. Stage 3a chronic kidney disease (HCC)  Assessment & Plan:   stable- continue same. 3. Atrial fibrillation with RVR ([facility] [address]  Assessment & Plan:   continue xarelto   Return in about 3 months (around 3/8/2023) for DM check, MWV. Data Unavailable     Orders Placed This Encounter   Procedures    POCT glycosylated hemoglobin (Hb A1C)     Orders Placed This Encounter   Medications    valsartan (DIOVAN) 80 MG tablet     Sig: Take 1 tablet by mouth daily     Dispense:  90 tablet     Refill:  3       Patient given educational materials - see patient instructions. Discussed use, benefit, and side effects of prescribed medications. All patient questions answered. Pt voiced understanding. Reviewed health maintenance. Instructed to continue current medications, diet and exercise. Patient agreed with treatment plan. Follow up as directed.      Electronicallysigned by Mauricio Unger MD on 12/8/2022 at 11:08 AM

## 2023-01-16 DIAGNOSIS — F41.9 ANXIETY: Primary | ICD-10-CM

## 2023-01-16 RX ORDER — LORAZEPAM 0.5 MG/1
0.5 TABLET ORAL EVERY 6 HOURS PRN
Qty: 60 TABLET | Refills: 0 | Status: SHIPPED | OUTPATIENT
Start: 2023-01-16 | End: 2023-02-15

## 2023-02-01 RX ORDER — AMIODARONE HYDROCHLORIDE 200 MG/1
TABLET ORAL
Qty: 90 TABLET | Refills: 3 | Status: SHIPPED | OUTPATIENT
Start: 2023-02-01

## 2023-02-20 ENCOUNTER — TELEPHONE (OUTPATIENT)
Dept: PRIMARY CARE CLINIC | Age: 88
End: 2023-02-20

## 2023-02-20 NOTE — TELEPHONE ENCOUNTER
Care Transitions Initial Follow Up Call    Outreach made within 2 business days of discharge: Yes    Patient: Guerline Muhammad Patient : 1935   MRN: [id number]  Reason for Admission: There are no discharge diagnoses documented for the most recent discharge. Discharge Date: 22       Spoke with: Ed    Discharge department/facility: [facility]    TCM Interactive Patient Contact:  Was patient able to fill all prescriptions: Yes  Was patient instructed to bring all medications to the follow-up visit: Yes  Is patient taking all medications as directed in the discharge summary?  Yes  Does patient understand their discharge instructions: Yes  Does patient have questions or concerns that need addressed prior to 7-14 day follow up office visit: no    Scheduled appointment with PCP within 7-14 days    Follow Up  Future Appointments   Date Time Provider Elina Perla   3/24/2023 11:00 AM Rick Hernandez MD [facility]   2023 11:30 AM Connie Gambino [address] [place] [address]

## 2023-02-20 NOTE — TELEPHONE ENCOUNTER
----- Message from Jaxon Suri sent at 2/20/2023  9:38 AM EST -----  Subject: Hospital Follow Up    QUESTIONS  What hospital was the Patient Discharged from? [facility]  Date of Discharge? 2023-02-17  Discharge Location? Home  Reason for hospitalization as patient stated? Blood in stool  What question does the patient have, if applicable? Patient needs to   schedule a hospital follow up asap. I am not able to reach the . Please call patient.  ---------------------------------------------------------------------------  --------------  CALL BACK INFO  What is the best way for the office to contact you? OK to leave message on   voicemail  Preferred Call Back Phone Number? [phone]  ---------------------------------------------------------------------------  --------------  SCRIPT ANSWERS  Relationship to Patient?  Self

## 2023-02-23 ENCOUNTER — CLINICAL DOCUMENTATION ONLY (OUTPATIENT)
Facility: CLINIC | Age: 88
End: 2023-02-23

## 2023-02-27 SDOH — ECONOMIC STABILITY: FOOD INSECURITY: WITHIN THE PAST 12 MONTHS, THE FOOD YOU BOUGHT JUST DIDN'T LAST AND YOU DIDN'T HAVE MONEY TO GET MORE.: NEVER TRUE

## 2023-02-27 SDOH — ECONOMIC STABILITY: FOOD INSECURITY: WITHIN THE PAST 12 MONTHS, YOU WORRIED THAT YOUR FOOD WOULD RUN OUT BEFORE YOU GOT MONEY TO BUY MORE.: NEVER TRUE

## 2023-02-27 SDOH — ECONOMIC STABILITY: TRANSPORTATION INSECURITY
IN THE PAST 12 MONTHS, HAS LACK OF TRANSPORTATION KEPT YOU FROM MEETINGS, WORK, OR FROM GETTING THINGS NEEDED FOR DAILY LIVING?: NO

## 2023-02-27 SDOH — ECONOMIC STABILITY: INCOME INSECURITY: HOW HARD IS IT FOR YOU TO PAY FOR THE VERY BASICS LIKE FOOD, HOUSING, MEDICAL CARE, AND HEATING?: NOT VERY HARD

## 2023-02-27 SDOH — ECONOMIC STABILITY: HOUSING INSECURITY
IN THE LAST 12 MONTHS, WAS THERE A TIME WHEN YOU DID NOT HAVE A STEADY PLACE TO SLEEP OR SLEPT IN A SHELTER (INCLUDING NOW)?: NO

## 2023-03-02 ENCOUNTER — OFFICE VISIT (OUTPATIENT)
Dept: PRIMARY CARE CLINIC | Age: 88
End: 2023-03-02

## 2023-03-02 VITALS
OXYGEN SATURATION: 98 % | BODY MASS INDEX: 24.62 KG/M2 | SYSTOLIC BLOOD PRESSURE: 148 MMHG | HEART RATE: 62 BPM | DIASTOLIC BLOOD PRESSURE: 62 MMHG | WEIGHT: 171.6 LBS

## 2023-03-02 DIAGNOSIS — E11.40 CONTROLLED TYPE 2 DIABETES WITH NEUROPATHY (HCC): ICD-10-CM

## 2023-03-02 DIAGNOSIS — I48.91 ATRIAL FIBRILLATION WITH RVR (HCC): ICD-10-CM

## 2023-03-02 DIAGNOSIS — I50.9 ACUTE ON CHRONIC CONGESTIVE HEART FAILURE, UNSPECIFIED HEART FAILURE TYPE (HCC): Primary | ICD-10-CM

## 2023-03-02 DIAGNOSIS — I20.9 ANGINA PECTORIS, UNSPECIFIED (HCC): ICD-10-CM

## 2023-03-02 DIAGNOSIS — I10 ESSENTIAL HYPERTENSION: ICD-10-CM

## 2023-03-02 LAB
BUN BLDV-MCNC: NORMAL MG/DL
CALCIUM SERPL-MCNC: NORMAL MG/DL
CHLORIDE BLD-SCNC: NORMAL MMOL/L
CO2: NORMAL
CREAT SERPL-MCNC: NORMAL MG/DL
EGFR: NORMAL
GLUCOSE BLD-MCNC: NORMAL MG/DL
POTASSIUM SERPL-SCNC: NORMAL MMOL/L
SODIUM BLD-SCNC: NORMAL MMOL/L

## 2023-03-02 RX ORDER — LANOLIN ALCOHOL/MO/W.PET/CERES
CREAM (GRAM) TOPICAL
COMMUNITY
Start: 2023-02-17

## 2023-03-02 RX ORDER — FUROSEMIDE 20 MG/1
20 TABLET ORAL DAILY
COMMUNITY

## 2023-03-02 SDOH — ECONOMIC STABILITY: FOOD INSECURITY: WITHIN THE PAST 12 MONTHS, YOU WORRIED THAT YOUR FOOD WOULD RUN OUT BEFORE YOU GOT MONEY TO BUY MORE.: NEVER TRUE

## 2023-03-02 SDOH — ECONOMIC STABILITY: FOOD INSECURITY: WITHIN THE PAST 12 MONTHS, THE FOOD YOU BOUGHT JUST DIDN'T LAST AND YOU DIDN'T HAVE MONEY TO GET MORE.: NEVER TRUE

## 2023-03-02 SDOH — ECONOMIC STABILITY: INCOME INSECURITY: HOW HARD IS IT FOR YOU TO PAY FOR THE VERY BASICS LIKE FOOD, HOUSING, MEDICAL CARE, AND HEATING?: NOT HARD AT ALL

## 2023-03-02 ASSESSMENT — ENCOUNTER SYMPTOMS
RHINORRHEA: 0
ABDOMINAL PAIN: 0
SHORTNESS OF BREATH: 0
DIARRHEA: 0
SORE THROAT: 0
EYE REDNESS: 0
WHEEZING: 0
COUGH: 0
EYE DISCHARGE: 0
VOMITING: 0
NAUSEA: 0

## 2023-03-02 ASSESSMENT — PATIENT HEALTH QUESTIONNAIRE - PHQ9
2. FEELING DOWN, DEPRESSED OR HOPELESS: 0
SUM OF ALL RESPONSES TO PHQ QUESTIONS 1-9: 0
SUM OF ALL RESPONSES TO PHQ9 QUESTIONS 1 & 2: 0
SUM OF ALL RESPONSES TO PHQ QUESTIONS 1-9: 0
1. LITTLE INTEREST OR PLEASURE IN DOING THINGS: 0
SUM OF ALL RESPONSES TO PHQ QUESTIONS 1-9: 0
SUM OF ALL RESPONSES TO PHQ QUESTIONS 1-9: 0

## 2023-03-02 NOTE — ASSESSMENT & PLAN NOTE
was low in hospital due to anemia and norvasc and diovan were stopped, but BP running high now.  will restart diovan and if still high then restart the norvasc- has f/u appt with cardiology 22nd of this month.

## 2023-03-02 NOTE — PROGRESS NOTES
[facility] PRIMARY CARE  [address] [facility] [address]  Dept: [phone]    Sanjana Yuan is a 80 y.o. male Established patient, who presents today for his medical conditions/complaints as noted below. Chief Complaint   Patient presents with    Follow-Up from Hospital     Patient was admitted to both [facility] and [facility] due to blood in stool. D/C on 2/17/2023. Patient has not noticed any blood since being home. HPI:     HPI- pt admitted with anemia/ blood in stool. Was off blood thinner for a few days and is back on it now and doing okay. No further blood. Had scopes done but they did not find anything. Was having some constipation but otherwise no source of bleeding.       Reviewed prior notes: None   Reviewed previous:  Labs    LDL Cholesterol (mg/dL)   Date Value   01/26/2018 39   10/05/2015 51     LDL Calculated (mg/dL)   Date Value   02/08/2021 51   03/06/2018 54   10/18/2016 70       (goal LDL is <100)   AST (U/L)   Date Value   08/23/2022 22     ALT (U/L)   Date Value   08/23/2022 12     BUN (mg/dL)   Date Value   01/03/2023 25 (H)     Hemoglobin A1C (%)   Date Value   12/08/2022 7.6     TSH (mIU/L)   Date Value   10/04/2015 2.99     BP Readings from Last 3 Encounters:   03/02/23 (!) 160/62   01/11/23 (!) 146/58   12/08/22 (!) 168/74          (goal 120/80)    Past Medical History:   Diagnosis Date    Acute myocardial ischemia ([address] 12/11/2015    Acute sinusitis 12/11/2015    Cerebral artery occlusion with cerebral infarction [facility])     Chest pain 10/5/2015    Diabetes mellitus ([address]     H/O cataract     Hyperlipidemia     Hypertension     MI (myocardial infarction) ([address] 1985    Neck stiffness 12/11/2015    Partial small bowel obstruction ([address] 11/7/2019    Peyronie's disease     Rotator cuff tendonitis 12/11/2015    TIA (transient ischemic attack) 1998      Past Surgical History:   Procedure Laterality Date    [facility]    CARDIAC SURGERY  01/2018    3 stents    CATARACT REMOVAL WITH IMPLANT Left     SKIN BIOPSY      forehead       Family History   Problem Relation Age of Onset    Cancer Mother         oral    Stroke Father     High Blood Pressure Brother     Heart Disease Brother     Cancer Maternal Grandmother     Diabetes type 2  Son     Heart Attack Son     Heart Disease Maternal Grandfather     Stroke Paternal Grandmother     Heart Disease Paternal Grandfather         ?        Social History     Tobacco Use    Smoking status: Former     Packs/day: 0.50     Years: 8.00     Pack years: 4.00     Types: Cigarettes    Smokeless tobacco: Never   Substance Use Topics    Alcohol use: No      Current Outpatient Medications   Medication Sig Dispense Refill    furosemide (LASIX) 20 MG tablet Take 20 mg by mouth daily      vitamin B-12 (CYANOCOBALAMIN) 1000 MCG tablet take 1 tablet by mouth every morning      amiodarone (CORDARONE) 200 MG tablet TAKE 1 TABLET DAILY 90 tablet 3    LANTUS SOLOSTAR 100 UNIT/ML injection pen INJECT 15 UNITS INTO THE SKIN ONCE NIGHTLY 12 mL 2    clopidogrel (PLAVIX) 75 MG tablet Take 75 mg by mouth daily      magnesium oxide (MAG-OX) 400 MG tablet Take 1 tablet by mouth daily 10 tablet 0    rivaroxaban (XARELTO) 15 MG TABS tablet Take 15 mg by mouth daily      pantoprazole (PROTONIX) 40 MG tablet Take 1 tablet by mouth 2 times daily (before meals) 180 tablet 3    ULTICARE SHORT PEN NEEDLES 31G X 8 MM MISC INJECT 1 NEEDLE INTO THE SKIN DAILY 100 each 3    Accu-Chek FastClix Lancets MISC USE ONE LANCET TO TEST TWICE A DAY AND AS NEEDED 102 each 3    blood glucose test strips (FREESTYLE TEST STRIPS) strip USE ONE STRIP TO TEST TWICE A DAY AND AS NEEDED 150 strip 1    atorvastatin (LIPITOR) 80 MG tablet TAKE 1 TABLET NIGHTLY 90 tablet 3    Blood Glucose Monitoring Suppl (FREESTYLE FREEDOM LITE) w/Device KIT 1 kit by Does not apply route daily as needed (check sugars bid and prn) 1 kit 0    acetaminophen (TYLENOL) 500 MG tablet Take 500 mg by mouth every 6 hours as needed for Pain      nitroGLYCERIN (NITROSTAT) 0.4 MG SL tablet Place 1 tablet under the tongue every 5 minutes as needed for Chest pain 25 tablet 1    Coenzyme Q10 (CO Q-10) 30 MG CAPS Take 30 mg by mouth daily      Ascorbic Acid (VITAMIN C) 250 MG tablet Take 500 mg by mouth daily      ferrous sulfate 325 (65 FE) MG tablet Take 325 mg by mouth daily       amLODIPine (NORVASC) 5 MG tablet Take 5 mg by mouth daily      valsartan (DIOVAN) 80 MG tablet Take 1 tablet by mouth daily (Patient taking differently: Take 40 mg by mouth daily) 90 tablet 3    furosemide (LASIX) 40 MG tablet Take 0.5 tablets by mouth daily 60 tablet 3     No current facility-administered medications for this visit. Allergies   Allergen Reactions    Sulfa Antibiotics Anaphylaxis       Health Maintenance   Topic Date Due    DTaP/Tdap/Td vaccine (1 - Tdap) Never done    Shingles vaccine (1 of 2) Never done    Lipids  02/08/2022    COVID-19 Vaccine (5 - Booster for Pfizer series) 06/07/2022    Annual Wellness Visit (AWV)  03/02/2023    Depression Screen  09/26/2023    Flu vaccine  Completed    Pneumococcal 65+ years Vaccine  Completed    Hepatitis A vaccine  Aged Out    Hib vaccine  Aged Out    Meningococcal (ACWY) vaccine  Aged Out       Subjective:      Review of Systems   Constitutional:  Negative for chills and fever. HENT:  Negative for rhinorrhea and sore throat. Eyes:  Negative for discharge and redness. Respiratory:  Negative for cough, shortness of breath and wheezing. Cardiovascular:  Negative for chest pain and palpitations. Gastrointestinal:  Negative for abdominal pain, diarrhea, nausea and vomiting. Genitourinary:  Negative for dysuria and frequency. Musculoskeletal:  Negative for arthralgias and myalgias. Neurological:  Negative for dizziness, light-headedness and headaches.    Psychiatric/Behavioral: Negative for sleep disturbance. Objective:     BP (!) 160/62   Pulse 62   Wt 171 lb 9.6 oz (77.8 kg)   SpO2 98%   BMI 24.62 kg/m²   Physical Exam  Vitals and nursing note reviewed. Constitutional:       General: He is not in acute distress. Appearance: He is well-developed. He is not ill-appearing. HENT:      Head: Normocephalic and atraumatic. Right Ear: External ear normal.      Left Ear: External ear normal.   Eyes:      General: No scleral icterus. Right eye: No discharge. Left eye: No discharge. Conjunctiva/sclera: Conjunctivae normal.   Neck:      Thyroid: No thyromegaly. Trachea: No tracheal deviation. Cardiovascular:      Rate and Rhythm: Normal rate and regular rhythm. Heart sounds: Normal heart sounds. Pulmonary:      Effort: Pulmonary effort is normal. No respiratory distress. Breath sounds: Normal breath sounds. No wheezing. Lymphadenopathy:      Cervical: No cervical adenopathy. Skin:     General: Skin is warm. Findings: No rash. Neurological:      Mental Status: He is alert and oriented to person, place, and time. Psychiatric:         Mood and Affect: Mood normal.         Behavior: Behavior normal.         Thought Content: Thought content normal.       Assessment/Plan:   1. Acute on chronic congestive heart failure, unspecified heart failure type [facility])  Assessment & Plan:    Had episode with anemia - improved now    2. Essential hypertension  Assessment & Plan:   was low in hospital due to anemia and norvasc and diovan were stopped, but BP running high now.  will restart diovan and if still high then restart the norvasc- has f/u appt with cardiology 22nd of this month. 3. Controlled type 2 diabetes with neuropathy ([address]  Assessment & Plan:   Borderline controlled, continue current medications    4.  Atrial fibrillation with RVR ([address]  Assessment & Plan:   Monitored by specialist- no acute findings meriting change in the plan    5. Angina pectoris, unspecified ([facility] [address]  Assessment & Plan:   Monitored by specialist- no acute findings meriting change in the plan       Return in about 3 months (around 6/2/2023). Has appt with me coming up. No orders of the defined types were placed in this encounter. No orders of the defined types were placed in this encounter. Patient given educational materials - see patient instructions. Discussed use, benefit, and side effects of prescribed medications. All patient questions answered. Pt voiced understanding. Reviewed health maintenance. Instructed to continue current medications, diet and exercise. Patient agreed with treatment plan. Follow up as directed.      Electronically signed by Prabhjot Scruggs MD on 3/2/2023 at 4:02 PM

## 2023-03-03 DIAGNOSIS — E11.9 TYPE 2 DIABETES MELLITUS WITHOUT COMPLICATION, WITH LONG-TERM CURRENT USE OF INSULIN (HCC): ICD-10-CM

## 2023-03-03 DIAGNOSIS — Z79.4 TYPE 2 DIABETES MELLITUS WITHOUT COMPLICATION, WITH LONG-TERM CURRENT USE OF INSULIN (HCC): ICD-10-CM

## 2023-03-03 DIAGNOSIS — I48.91 ATRIAL FIBRILLATION WITH RVR (HCC): ICD-10-CM

## 2023-03-03 RX ORDER — INSULIN GLARGINE 100 [IU]/ML
15 INJECTION, SOLUTION SUBCUTANEOUS NIGHTLY
Qty: 12 ML | Refills: 2 | Status: SHIPPED | OUTPATIENT
Start: 2023-03-03

## 2023-03-03 RX ORDER — LANCETS
EACH MISCELLANEOUS
Qty: 102 EACH | Refills: 5 | Status: SHIPPED | OUTPATIENT
Start: 2023-03-03

## 2023-03-23 SDOH — HEALTH STABILITY: PHYSICAL HEALTH: ON AVERAGE, HOW MANY DAYS PER WEEK DO YOU ENGAGE IN MODERATE TO STRENUOUS EXERCISE (LIKE A BRISK WALK)?: 0 DAYS

## 2023-03-23 ASSESSMENT — LIFESTYLE VARIABLES
HOW OFTEN DO YOU HAVE SIX OR MORE DRINKS ON ONE OCCASION: 1
HOW MANY STANDARD DRINKS CONTAINING ALCOHOL DO YOU HAVE ON A TYPICAL DAY: 0
HOW OFTEN DO YOU HAVE A DRINK CONTAINING ALCOHOL: 1
HOW MANY STANDARD DRINKS CONTAINING ALCOHOL DO YOU HAVE ON A TYPICAL DAY: PATIENT DOES NOT DRINK
HOW OFTEN DO YOU HAVE A DRINK CONTAINING ALCOHOL: NEVER

## 2023-03-23 ASSESSMENT — PATIENT HEALTH QUESTIONNAIRE - PHQ9
SUM OF ALL RESPONSES TO PHQ9 QUESTIONS 1 & 2: 1
SUM OF ALL RESPONSES TO PHQ QUESTIONS 1-9: 1
1. LITTLE INTEREST OR PLEASURE IN DOING THINGS: 1
SUM OF ALL RESPONSES TO PHQ QUESTIONS 1-9: 1
SUM OF ALL RESPONSES TO PHQ QUESTIONS 1-9: 1
2. FEELING DOWN, DEPRESSED OR HOPELESS: 0
SUM OF ALL RESPONSES TO PHQ QUESTIONS 1-9: 1

## 2023-03-24 ENCOUNTER — OFFICE VISIT (OUTPATIENT)
Dept: PRIMARY CARE CLINIC | Age: 88
End: 2023-03-24

## 2023-03-24 VITALS
WEIGHT: 174.4 LBS | SYSTOLIC BLOOD PRESSURE: 138 MMHG | BODY MASS INDEX: 24.97 KG/M2 | OXYGEN SATURATION: 100 % | DIASTOLIC BLOOD PRESSURE: 62 MMHG | HEIGHT: 70 IN | HEART RATE: 55 BPM

## 2023-03-24 DIAGNOSIS — Z79.4 TYPE 2 DIABETES MELLITUS WITHOUT COMPLICATION, WITH LONG-TERM CURRENT USE OF INSULIN (HCC): Primary | ICD-10-CM

## 2023-03-24 DIAGNOSIS — E11.9 TYPE 2 DIABETES MELLITUS WITHOUT COMPLICATION, WITH LONG-TERM CURRENT USE OF INSULIN (HCC): Primary | ICD-10-CM

## 2023-03-24 DIAGNOSIS — Z00.00 MEDICARE ANNUAL WELLNESS VISIT, SUBSEQUENT: ICD-10-CM

## 2023-03-24 DIAGNOSIS — F41.9 ANXIETY: ICD-10-CM

## 2023-03-24 LAB — HBA1C MFR BLD: 6.5 %

## 2023-03-24 RX ORDER — FUROSEMIDE 20 MG/1
20 TABLET ORAL EVERY OTHER DAY
Qty: 45 TABLET | Refills: 2 | Status: SHIPPED | OUTPATIENT
Start: 2023-03-24

## 2023-03-24 RX ORDER — LORAZEPAM 0.5 MG/1
0.5 TABLET ORAL EVERY 6 HOURS PRN
Qty: 60 TABLET | Refills: 0 | Status: SHIPPED | OUTPATIENT
Start: 2023-03-24 | End: 2023-04-23

## 2023-03-24 NOTE — PROGRESS NOTES
Objective   Vitals:    03/24/23 1104   BP: (!) 146/78   Pulse: 55   SpO2: 100%   Weight: 174 lb 6.4 oz (79.1 kg)   Height: 5' 10\" (1.778 m)      Body mass index is 25.02 kg/m². Physical Exam  Vitals and nursing note reviewed. Constitutional:       General: He is not in acute distress. Appearance: He is well-developed. He is not ill-appearing. HENT:      Head: Normocephalic and atraumatic. Right Ear: External ear normal.      Left Ear: External ear normal.   Eyes:      General: No scleral icterus. Right eye: No discharge. Left eye: No discharge. Conjunctiva/sclera: Conjunctivae normal.   Neck:      Thyroid: No thyromegaly. Trachea: No tracheal deviation. Cardiovascular:      Rate and Rhythm: Normal rate and regular rhythm. Heart sounds: Normal heart sounds. Pulmonary:      Effort: Pulmonary effort is normal. No respiratory distress. Breath sounds: Normal breath sounds. No wheezing. Lymphadenopathy:      Cervical: No cervical adenopathy. Skin:     General: Skin is warm. Findings: No rash. Neurological:      Mental Status: He is alert and oriented to person, place, and time. Psychiatric:         Mood and Affect: Mood normal.         Behavior: Behavior normal.         Thought Content: Thought content normal.           Allergies   Allergen Reactions    Sulfa Antibiotics Anaphylaxis     Prior to Visit Medications    Medication Sig Taking? Authorizing Provider   furosemide (LASIX) 20 MG tablet Take 1 tablet by mouth every other day Yes Mau Navarro MD   LORazepam (ATIVAN) 0.5 MG tablet Take 1 tablet by mouth every 6 hours as needed for Anxiety for up to 30 days. Take 0.5 mg by mouth every 6 hours as needed for Anxiety.  Max Daily Amount: 2 mg Yes Mau Navarro MD   insulin glargine (LANTUS SOLOSTAR) 100 UNIT/ML injection pen Inject 15 Units into the skin nightly Yes Mau Navarro MD   Accu-Chek FastClix Lancets

## 2023-06-05 RX ORDER — ATORVASTATIN CALCIUM 80 MG/1
TABLET, FILM COATED ORAL
Qty: 90 TABLET | Refills: 3 | Status: SHIPPED | OUTPATIENT
Start: 2023-06-05

## 2023-06-05 NOTE — TELEPHONE ENCOUNTER
LAST VISIT:   3/24/2023     Future Appointments   Date Time Provider Elina Perla   6/7/2023 11:30 AM SANAM Wolf - CNP [facility] None   6/26/2023 10:15 AM Rhina Camp MD [facility] Roxanne Rapp

## 2023-06-09 ENCOUNTER — TELEPHONE (OUTPATIENT)
Dept: PRIMARY CARE CLINIC | Age: 88
End: 2023-06-09

## 2023-06-09 ENCOUNTER — CLINICAL DOCUMENTATION ONLY (OUTPATIENT)
Facility: CLINIC | Age: 88
End: 2023-06-09

## 2023-06-09 NOTE — TELEPHONE ENCOUNTER
Care Transitions Initial Follow Up Call    Outreach made within 2 business days of discharge: Yes    Patient: John Soliman Patient : 1935   MRN: [id number]  Reason for Admission: There are no discharge diagnoses documented for the most recent discharge. Discharge Date: 22       Spoke with: ED    Discharge department/facility: [facility] Interactive Patient Contact:  Was patient able to fill all prescriptions: Yes  Was patient instructed to bring all medications to the follow-up visit: Yes  Is patient taking all medications as directed in the discharge summary?  Yes  Does patient understand their discharge instructions: Yes  Does patient have questions or concerns that need addressed prior to 7-14 day follow up office visit: no    PT WILL KEEP APPT ALREADY SCHEDULED 23    Follow Up  Future Appointments   Date Time Provider Elina Perla   2023 10:15 AM MD STANTON Jain  [facility]   2023 10:50 AM Arelis Sanchez, [facility]       Jazmyn Bernal MA

## 2023-06-26 ENCOUNTER — OFFICE VISIT (OUTPATIENT)
Dept: PRIMARY CARE CLINIC | Age: 88
End: 2023-06-26
Payer: MEDICARE

## 2023-06-26 VITALS
DIASTOLIC BLOOD PRESSURE: 62 MMHG | SYSTOLIC BLOOD PRESSURE: 96 MMHG | OXYGEN SATURATION: 100 % | WEIGHT: 171.2 LBS | HEART RATE: 56 BPM | BODY MASS INDEX: 24.56 KG/M2

## 2023-06-26 DIAGNOSIS — I10 ESSENTIAL HYPERTENSION: ICD-10-CM

## 2023-06-26 DIAGNOSIS — Z79.4 TYPE 2 DIABETES MELLITUS WITH CHRONIC KIDNEY DISEASE, WITH LONG-TERM CURRENT USE OF INSULIN, UNSPECIFIED CKD STAGE (HCC): Primary | ICD-10-CM

## 2023-06-26 DIAGNOSIS — E11.22 TYPE 2 DIABETES MELLITUS WITH CHRONIC KIDNEY DISEASE, WITH LONG-TERM CURRENT USE OF INSULIN, UNSPECIFIED CKD STAGE (HCC): Primary | ICD-10-CM

## 2023-06-26 DIAGNOSIS — E27.8 ADRENAL MASS (HCC): ICD-10-CM

## 2023-06-26 DIAGNOSIS — F41.9 ANXIETY: ICD-10-CM

## 2023-06-26 DIAGNOSIS — M43.06 LUMBAR SPONDYLOLYSIS: ICD-10-CM

## 2023-06-26 DIAGNOSIS — K86.2 PANCREATIC CYST: ICD-10-CM

## 2023-06-26 LAB — HBA1C MFR BLD: 8.1 %

## 2023-06-26 PROCEDURE — G8427 DOCREV CUR MEDS BY ELIG CLIN: HCPCS | Performed by: FAMILY MEDICINE

## 2023-06-26 PROCEDURE — 1036F TOBACCO NON-USER: CPT | Performed by: FAMILY MEDICINE

## 2023-06-26 PROCEDURE — 3052F HG A1C>EQUAL 8.0%<EQUAL 9.0%: CPT | Performed by: FAMILY MEDICINE

## 2023-06-26 PROCEDURE — 1123F ACP DISCUSS/DSCN MKR DOCD: CPT | Performed by: FAMILY MEDICINE

## 2023-06-26 PROCEDURE — 83037 HB GLYCOSYLATED A1C HOME DEV: CPT | Performed by: FAMILY MEDICINE

## 2023-06-26 PROCEDURE — 99213 OFFICE O/P EST LOW 20 MIN: CPT | Performed by: FAMILY MEDICINE

## 2023-06-26 PROCEDURE — G8420 CALC BMI NORM PARAMETERS: HCPCS | Performed by: FAMILY MEDICINE

## 2023-06-26 RX ORDER — LORAZEPAM 0.5 MG/1
0.5 TABLET ORAL EVERY 6 HOURS PRN
COMMUNITY
End: 2023-06-26 | Stop reason: SDUPTHER

## 2023-06-26 RX ORDER — LORAZEPAM 0.5 MG/1
0.5 TABLET ORAL EVERY 6 HOURS PRN
Qty: 60 TABLET | Refills: 0 | Status: SHIPPED | OUTPATIENT
Start: 2023-06-26 | End: 2023-08-25

## 2023-06-26 ASSESSMENT — ENCOUNTER SYMPTOMS
VOMITING: 0
EYE REDNESS: 0
COUGH: 0
DIARRHEA: 0
SHORTNESS OF BREATH: 0
WHEEZING: 0
BACK PAIN: 1
NAUSEA: 0
ABDOMINAL PAIN: 0
EYE DISCHARGE: 0
RHINORRHEA: 0
SORE THROAT: 0

## 2023-06-27 NOTE — PROGRESS NOTES
[facility]  Office: [address], DO, Dereje Glover, DO, Arianna Arboleda, DO, Julio Porter, DO, Chuck Connolly MD, Brenda Patino MD, Kennis Osler, MD, Deirdre Lemos MD,  Patel Landry MD, Roxana Diane MD, Audelia Espinoza, DO, Radha Ayers MD,  Blue Austin MD, Lisa Lowe MD, Kim Garcia DO, Jag Dominique MD, Irina Gonzalez MD, Sofia Messina MD, Myron Olivarez, DO, Irasema Parra MD, Reinaldo Schultz MD, Radha Hansen CNP,  Nehemias Ware CNP, Willy Galo CNP, Harshil Mandujano CNP, Alan Marcano PA-C, Demetris Moore DNP, Jewel Ga, CNP, Blaire Patricia, CNP, Ashley Ballard, CNP, Ember Acuña, CNP, Romaine Mercado, CNP, Brad Gilmore, CNS, Onelia Pineda [facility], Andrew Antunez CNP, Isi Moreland, CNP, Sandra Fleming, [address] [place] [address]    Progress Note    8/24/2022    3:09 PM    Name:   Braxton Otto  MRN:     [id number]     [place]:      [de-identified]   Room:   2001/2001-01   Day:  2  Admit Date:  8/22/2022  8:22 PM    PCP:   Gwen Kelly MD  Code Status:  Full Code    Subjective:     C/C:   Chief Complaint   Patient presents with    Chest Pain     Interval History Status: not changed. Patient seen and examined, no issues overnight. Patient this morning after shower became acutely dizzy, vitals were done and heart rate was in the 60s, blood pressure was adequate. Cardiology was recalled to reevaluate. New stress testing was ordered and taken for nuclear stress test this afternoon. Brief History:     From H&P  Braxton Otto is a 80 y.o.  male with history of coronary disease status post prior MI in 2018, prior stroke, type 2 diabetes, CKD 3 who presented with acute symptomatic tachycardia with chest pain with Chest Pain   and is admitted to the hospital for the management of Atrial fibrillation with RVR ([facility] [address]      Patient with prior episode of chest pain 2 to 3 weeks ago status post evaluation at [facility]. Was seen in follow-up with Counts include 234 beds at the [facility] cardiology earlier in August.  Recommended for stress test which was scheduled for September. Patient describes abrupt onset of chest pain earlier this evening after standing up in the kitchen after eating. Symptoms persisted for 2-1/2 hours constant. Currently resolved since arrival to ED status post heparin/amio drip. Pain was initially Sharp aching quality midsternum with radiation to the left shoulder. Denied any prior episode of chest pain shortness of breath when he was doing yard work earlier that morning. Was out in the yard for approximately 30 minutes this morning. Did denies any heavy lifting. With chest pain episode patient did become acutely diaphoretic with nausea with mild pleurisy. + Tachycardia.  + Lightheadedness but denies near syncope or collapse. Denies prior DVT PE or recent travel. Status post heparin/amio drip in ED. In ED patient found to have heart rates in the 130s to 150s with A. fib status post loading with amiodarone drip. Chest pain has resolved since controlling tachycardia. Denies any prior issues with tachycardia, palpitations. Denies fevers or chills or flulike symptoms. Denies positional changes. Review of Systems:     Constitutional:  negative for chills, fevers, sweats  Respiratory:  negative for cough, dyspnea on exertion, shortness of breath, wheezing  Cardiovascular:  negative for chest pain, chest pressure/discomfort, lower extremity edema, palpitations  Gastrointestinal:  negative for abdominal pain, constipation, diarrhea, nausea, vomiting  Neurological:  negative for dizziness, headache    Medications: Allergies:     Allergies   Allergen Reactions    Sulfa Antibiotics Anaphylaxis       Current Meds:   Scheduled Meds:    amiodarone  200 mg Oral BID    Followed by    Chikis Cee ON 8/30/2022] amiodarone  200 mg Oral Daily    rivaroxaban  20 mg Oral Daily clopidogrel  75 mg Oral Daily    amLODIPine  5 mg Oral Daily    atorvastatin  80 mg Oral Nightly    [Held by provider] clopidogrel  75 mg Oral Daily    insulin glargine  15 Units SubCUTAneous Nightly    metoprolol succinate  12.5 mg Oral Daily    sodium chloride flush  5-40 mL IntraVENous 2 times per day    insulin lispro  0-8 Units SubCUTAneous TID WC    insulin lispro  0-4 Units SubCUTAneous Nightly     Continuous Infusions:    sodium chloride      dextrose       PRN Meds: sodium chloride flush, sodium chloride flush, sodium chloride flush, sodium chloride, ondansetron **OR** ondansetron, polyethylene glycol, acetaminophen **OR** acetaminophen, glucose, dextrose bolus **OR** dextrose bolus, glucagon (rDNA), dextrose    Data:     Past Medical History:   has a past medical history of Acute myocardial ischemia (HCC), Acute sinusitis, Cerebral artery occlusion with cerebral infarction [facility]), Chest pain, Diabetes mellitus ([facility] [address] H/O cataract, Hyperlipidemia, Hypertension, MI (myocardial infarction) ([facility] [address] Neck stiffness, Partial small bowel obstruction ([facility] [address] Peyronie's disease, Rotator cuff tendonitis, and TIA (transient ischemic attack). Social History:   reports that he has quit smoking. He has a 4.00 pack-year smoking history. He has never used smokeless tobacco. He reports that he does not drink alcohol and does not use drugs. Family History:   Family History   Problem Relation Age of Onset    Cancer Mother         oral    Stroke Father     High Blood Pressure Brother     Heart Disease Brother     Cancer Maternal Grandmother     Diabetes type 2  Son     Heart Attack Son     Heart Disease Maternal Grandfather     Stroke Paternal Grandmother     Heart Disease Paternal Grandfather         ?        Vitals:  BP (!) 156/62   Pulse 60   Temp 98.6 °F (37 °C) (Oral)   Resp 16   Ht 5' 10\" (1.778 m)   Wt 180 lb (81.6 kg)   SpO2 95%   BMI 25.83 kg/m²   Temp (24hrs), Av.4 °F (36.9 °C), Min:98.2 °F (36.8 °C), Max:98.6 °F (37 °C)    Recent Labs     08/23/22  1312 08/23/22 2054 08/24/22  0730 08/24/22  1120   POCGLU 131* 207* 108 154*       I/O (24Hr):     Intake/Output Summary (Last 24 hours) at 8/24/2022 1509  Last data filed at 8/23/2022 2207  Gross per 24 hour   Intake --   Output 400 ml   Net -400 ml       Labs:  Hematology:  Recent Labs     08/22/22 2040 08/23/22  0547   WBC 6.7 6.4   RBC 3.51* 3.23*   HGB 10.8* 10.0*   HCT 33.2* 30.0*   MCV 94.6 92.9   MCH 30.8 31.0   MCHC 32.5 33.3   RDW 12.5 12.3    167   MPV 11.0 10.8   INR  --  1.1     Chemistry:  Recent Labs     08/22/22 2040 08/22/22 2055 08/22/22 2240 08/23/22  0236 08/23/22  0547 08/23/22  0744 08/23/22  0951     --   --   --  138  --   --    K 4.8  --   --   --  4.8  --   --      --   --   --  106  --   --    CO2 20  --   --   --  22  --   --    GLUCOSE 238*  --   --  234 171* 130  --    BUN 23  --   --   --  21  --   --    CREATININE 1.34* 1.36*  --   --  1.37*  --   --    MG  --   --   --   --  2.2  --   --    ANIONGAP 13  --   --   --  10  --   --    LABGLOM 51* 50*  --   --  49*  --   --    GFRAA >60  --   --   --  60*  --   --    CALCIUM 8.7  --   --   --  8.4*  --   --    PROBNP 1,009*  --   --   --  1,706*  --   --    TROPHS 13  --  28*  --   --   --  97*     Recent Labs     08/23/22 0229 08/23/22  0547 08/23/22  0744 08/23/22  1312 08/23/22  2054 08/24/22  0730 08/24/22  1120   PROT  --  6.2*  --   --   --   --   --    LABALBU  --  3.7  --   --   --   --   --    AST  --  22  --   --   --   --   --    ALT  --  12  --   --   --   --   --    ALKPHOS  --  83  --   --   --   --   --    BILITOT  --  0.26*  --   --   --   --   --    POCGLU 234*  --  130* 131* 207* 108 154*     ABG:No results found for: POCPH, PHART, PH, POCPCO2, WCS4YIU, PCO2, POCPO2, PO2ART, PO2, POCHCO3, UFU5CBF, HCO3, NBEA, PBEA, BEART, BE, THGBART, THB, VPR8CWL, FTFE2FRI, U5BPXHUW, O2SAT, FIO2  No results found for: SPECIAL  No results found for: CULTURE    Radiology:  XR CHEST PORTABLE    Result Date: 8/22/2022  Clear lungs. Physical Examination:        General appearance:  alert, cooperative and no distress  Mental Status:  oriented to person, place and time and normal affect  Lungs:  clear to auscultation bilaterally, normal effort  Heart: Irregularly irregular rate rhythm no murmurs rubs gallops  Abdomen:  soft, nontender, nondistended, normal bowel sounds, no masses, hepatomegaly, splenomegaly  Extremities:  no edema, redness, tenderness in the calves  Skin:  no gross lesions, rashes, induration    Assessment:        Hospital Problems             Last Modified POA    * (Principal) Atrial fibrillation with RVR ([facility]) 8/23/2022 Yes    Lactic acid acidosis 8/23/2022 Yes    CKD (chronic kidney disease) stage 3, GFR 30-59 ml/min ([facility]) 8/23/2022 Yes    Controlled type 2 diabetes with neuropathy ([address] 8/23/2022 Yes    CAD (coronary artery disease) 8/23/2022 Yes    Demand ischemia ([facility] [address] 8/23/2022 Yes    Unstable angina ([facility] [address] 8/23/2022 Yes    Anemia 8/23/2022 Yes       Plan:        Atrial fibrillation with RVR-patient was on amiodarone and Toprol-XL, bradycardic this morning and after shower became very dizzy. Blood pressure in the 150s and otherwise no obvious etiology for dizziness besides bradycardia. Discussed with cardiology, they will come to reevaluate. Apparently sent for stress testing later this morning, awaiting results. CAD  Type 2 diabetes  Plan:  Await results of stress test, patient was not given Toprol-XL today.   Hold off on discharge until further plans from cardiology      Charles Herbert DO  8/24/2022  3:09 PM Bilateral Helical Rim Advancement Flap Text: The defect edges were debeveled with a #15 blade scalpel.  Given the location of the defect and the proximity to free margins (helical rim) a bilateral helical rim advancement flap was deemed most appropriate.  Using a sterile surgical marker, the appropriate advancement flaps were drawn incorporating the defect and placing the expected incisions between the helical rim and antihelix where possible.  The area thus outlined was incised through and through with a #15 scalpel blade.  With a skin hook and iris scissors, the flaps were gently and sharply undermined and freed up.

## 2023-07-11 ENCOUNTER — NURSE ONLY (OUTPATIENT)
Dept: PRIMARY CARE CLINIC | Age: 88
End: 2023-07-11

## 2023-07-11 VITALS — SYSTOLIC BLOOD PRESSURE: 110 MMHG | DIASTOLIC BLOOD PRESSURE: 62 MMHG | HEART RATE: 62 BPM | OXYGEN SATURATION: 98 %

## 2023-07-11 DIAGNOSIS — I48.0 PAROXYSMAL ATRIAL FIBRILLATION (HCC): Primary | ICD-10-CM

## 2023-07-11 NOTE — PROGRESS NOTES
Pt here for BP check and to compare home cuff to office. Today's office reading is 110/62, pt's home monitor was 136/64 this morning. Pt states he checks it at home once a day but machine is 21years old and does not think it is reading right. Per YUNIEL Harris, BP in office today is good, pt to continue same dose of medication and recheck at f/u with pcp. Pt was advise to get new updated home BP monitor. Pt voiced verbal understanding with no concerns.

## 2023-07-12 DIAGNOSIS — E11.9 TYPE 2 DIABETES MELLITUS WITHOUT COMPLICATION, WITH LONG-TERM CURRENT USE OF INSULIN (HCC): ICD-10-CM

## 2023-07-12 DIAGNOSIS — Z79.4 TYPE 2 DIABETES MELLITUS WITHOUT COMPLICATION, WITH LONG-TERM CURRENT USE OF INSULIN (HCC): ICD-10-CM

## 2023-07-12 RX ORDER — BLOOD SUGAR DIAGNOSTIC
STRIP MISCELLANEOUS
Qty: 150 STRIP | Refills: 1 | Status: SHIPPED | OUTPATIENT
Start: 2023-07-12 | End: 2023-07-14 | Stop reason: SDUPTHER

## 2023-07-12 RX ORDER — LANCETS
EACH MISCELLANEOUS
Qty: 102 EACH | Refills: 5 | Status: SHIPPED | OUTPATIENT
Start: 2023-07-12 | End: 2023-07-14 | Stop reason: SDUPTHER

## 2023-07-12 NOTE — TELEPHONE ENCOUNTER
LAST VISIT:   6/26/2023     Future Appointments   Date Time Provider [address]   10/12/2023 10:00 AM Jordin Dias MD [facility]   11/1/2023 11:30 AM SANAM Bergeron - CNP [facility] None

## 2023-07-12 NOTE — TELEPHONE ENCOUNTER
LAST VISIT:   6/26/2023     Future Appointments   Date Time Provider [address]   10/12/2023 10:00 AM [facility]   11/1/2023 11:30 AM SANAM Carvalho - CNP [facility] None

## 2023-07-14 DIAGNOSIS — E11.9 TYPE 2 DIABETES MELLITUS WITHOUT COMPLICATION, WITH LONG-TERM CURRENT USE OF INSULIN (HCC): ICD-10-CM

## 2023-07-14 DIAGNOSIS — Z79.4 TYPE 2 DIABETES MELLITUS WITHOUT COMPLICATION, WITH LONG-TERM CURRENT USE OF INSULIN (HCC): ICD-10-CM

## 2023-07-14 RX ORDER — BLOOD SUGAR DIAGNOSTIC
STRIP MISCELLANEOUS
Qty: 150 STRIP | Refills: 5 | Status: SHIPPED | OUTPATIENT
Start: 2023-07-14

## 2023-07-14 RX ORDER — LANCETS
EACH MISCELLANEOUS
Qty: 102 EACH | Refills: 5 | Status: SHIPPED | OUTPATIENT
Start: 2023-07-14

## 2023-07-17 DIAGNOSIS — E11.9 TYPE 2 DIABETES MELLITUS WITHOUT COMPLICATION, WITH LONG-TERM CURRENT USE OF INSULIN (HCC): ICD-10-CM

## 2023-07-17 DIAGNOSIS — Z79.4 TYPE 2 DIABETES MELLITUS WITHOUT COMPLICATION, WITH LONG-TERM CURRENT USE OF INSULIN (HCC): ICD-10-CM

## 2023-07-17 RX ORDER — LANCETS
EACH MISCELLANEOUS
Qty: 102 EACH | Refills: 5 | OUTPATIENT
Start: 2023-07-17

## 2023-07-17 RX ORDER — BLOOD SUGAR DIAGNOSTIC
STRIP MISCELLANEOUS
Qty: 150 STRIP | Refills: 5 | OUTPATIENT
Start: 2023-07-17

## 2023-08-02 ENCOUNTER — TELEPHONE (OUTPATIENT)
Dept: PRIMARY CARE CLINIC | Age: 88
End: 2023-08-02

## 2023-08-02 NOTE — TELEPHONE ENCOUNTER
Pt needs his lasix sent in for #90. Usually gets it from his kidney  Has not been able to reach them and he is almost out. Asking if you would send it in for him? [facility] listed.

## 2023-08-09 ENCOUNTER — OFFICE VISIT (OUTPATIENT)
Dept: PRIMARY CARE CLINIC | Age: 88
End: 2023-08-09
Payer: MEDICARE

## 2023-08-09 VITALS
SYSTOLIC BLOOD PRESSURE: 114 MMHG | HEIGHT: 70 IN | HEART RATE: 66 BPM | DIASTOLIC BLOOD PRESSURE: 68 MMHG | BODY MASS INDEX: 24.62 KG/M2 | WEIGHT: 172 LBS | OXYGEN SATURATION: 97 %

## 2023-08-09 DIAGNOSIS — R21 RASH: ICD-10-CM

## 2023-08-09 DIAGNOSIS — T14.8XXA BRUISING: Primary | ICD-10-CM

## 2023-08-09 PROCEDURE — G8420 CALC BMI NORM PARAMETERS: HCPCS | Performed by: FAMILY MEDICINE

## 2023-08-09 PROCEDURE — 1036F TOBACCO NON-USER: CPT | Performed by: FAMILY MEDICINE

## 2023-08-09 PROCEDURE — 99213 OFFICE O/P EST LOW 20 MIN: CPT | Performed by: FAMILY MEDICINE

## 2023-08-09 PROCEDURE — G8427 DOCREV CUR MEDS BY ELIG CLIN: HCPCS | Performed by: FAMILY MEDICINE

## 2023-08-09 PROCEDURE — 1123F ACP DISCUSS/DSCN MKR DOCD: CPT | Performed by: FAMILY MEDICINE

## 2023-08-11 DIAGNOSIS — R21 RASH: ICD-10-CM

## 2023-08-11 DIAGNOSIS — T14.8XXA BRUISING: ICD-10-CM

## 2023-08-11 LAB
ABSOLUTE IMMATURE GRANULOCYTE: <0.03 K/UL (ref 0–0.3)
BASOPHILS ABSOLUTE: 0.04 K/UL (ref 0–0.2)
BASOPHILS RELATIVE PERCENT: 1 % (ref 0–2)
EOSINOPHILS ABSOLUTE: 0.11 K/UL (ref 0–0.44)
EOSINOPHILS RELATIVE PERCENT: 2 % (ref 1–4)
HCT VFR BLD CALC: 32.8 % (ref 40.7–50.3)
HEMOGLOBIN: 10.2 G/DL (ref 13–17)
IMMATURE GRANULOCYTES: 0 %
INR BLD: 3.5
LYMPHOCYTES ABSOLUTE: 1.96 K/UL (ref 1.1–3.7)
LYMPHOCYTES RELATIVE PERCENT: 35 % (ref 24–43)
MCH RBC QN AUTO: 30.5 PG (ref 25.2–33.5)
MCHC RBC AUTO-ENTMCNC: 31.1 G/DL (ref 28.4–34.8)
MCV RBC AUTO: 98.2 FL (ref 82.6–102.9)
MONOCYTES ABSOLUTE: 0.57 K/UL (ref 0.1–1.2)
MONOCYTES RELATIVE PERCENT: 10 % (ref 3–12)
NEUTROPHILS ABSOLUTE: 2.91 K/UL (ref 1.5–8.1)
NEUTROPHILS RELATIVE PERCENT: 52 % (ref 36–65)
NRBC AUTOMATED: 0 PER 100 WBC
PARTIAL THROMBOPLASTIN TIME: 54.1 SEC (ref 23–36.5)
PDW BLD-RTO: 13.4 % (ref 11.8–14.4)
PLATELET # BLD: 211 K/UL (ref 138–453)
PMV BLD AUTO: 11.7 FL (ref 8.1–13.5)
PROTHROMBIN TIME: 34.9 SEC (ref 11.7–14.9)
RBC # BLD: 3.34 M/UL (ref 4.21–5.77)
WBC # BLD: 5.6 K/UL (ref 3.5–11.3)

## 2023-08-14 DIAGNOSIS — N18.32 CHRONIC KIDNEY DISEASE, STAGE 3B (HCC): ICD-10-CM

## 2023-08-14 DIAGNOSIS — T14.8XXA BRUISING: ICD-10-CM

## 2023-08-14 DIAGNOSIS — R79.1 ELEVATED INR: Primary | ICD-10-CM

## 2023-08-14 NOTE — RESULT ENCOUNTER NOTE
Adv pt his bleeding levels are off for some reason- need to find out why- double check and make sure he did not get changed to warfarin/ coumadin by someone in the hospital. If not and still taking just the xarelto, then should get some other labs and an u/s done.   Orders in

## 2023-08-16 ENCOUNTER — TELEPHONE (OUTPATIENT)
Dept: PRIMARY CARE CLINIC | Age: 88
End: 2023-08-16

## 2023-08-16 NOTE — TELEPHONE ENCOUNTER
----- Message from Andres Harris sent at 8/15/2023  3:40 PM EDT -----  Subject: Message to Provider    QUESTIONS  Information for Provider? Patient has active orders and was supposed to   have them sent to [facility]. Patient says [facility] has not received   these forms. Please call patient back to discuss. ---------------------------------------------------------------------------  --------------  Sumi JIMÉNEZ  [phone]; OK to leave message on voicemail,OK to respond with electronic   message via [employer] portal (only for patients who have registered [employer]   account)  ---------------------------------------------------------------------------  --------------  SCRIPT ANSWERS  Relationship to Patient?  Self

## 2023-09-05 DIAGNOSIS — F41.9 ANXIETY: ICD-10-CM

## 2023-09-05 RX ORDER — LORAZEPAM 0.5 MG/1
0.5 TABLET ORAL EVERY 6 HOURS PRN
Qty: 60 TABLET | Refills: 0 | Status: SHIPPED | OUTPATIENT
Start: 2023-09-05 | End: 2023-11-04

## 2023-09-05 RX ORDER — PEN NEEDLE, DIABETIC 29 G X1/2"
NEEDLE, DISPOSABLE MISCELLANEOUS
Qty: 100 EACH | Refills: 3 | Status: SHIPPED | OUTPATIENT
Start: 2023-09-05

## 2023-09-05 NOTE — TELEPHONE ENCOUNTER
LAST VISIT:   8/9/2023     Future Appointments   Date Time Provider [address]   10/12/2023 10:00 AM [facility]   11/1/2023 11:30 AM SANAM Cardozo - CNP [facility] None

## 2023-09-14 ENCOUNTER — OFFICE VISIT (OUTPATIENT)
Dept: PRIMARY CARE CLINIC | Age: 88
End: 2023-09-14
Payer: MEDICARE

## 2023-09-14 VITALS
DIASTOLIC BLOOD PRESSURE: 66 MMHG | WEIGHT: 172 LBS | HEART RATE: 50 BPM | HEIGHT: 70 IN | BODY MASS INDEX: 24.62 KG/M2 | SYSTOLIC BLOOD PRESSURE: 118 MMHG | OXYGEN SATURATION: 100 %

## 2023-09-14 DIAGNOSIS — R55 SYNCOPE, UNSPECIFIED SYNCOPE TYPE: Primary | ICD-10-CM

## 2023-09-14 DIAGNOSIS — S61.412A SKIN TEAR OF LEFT HAND WITHOUT COMPLICATION, INITIAL ENCOUNTER: ICD-10-CM

## 2023-09-14 PROCEDURE — G8420 CALC BMI NORM PARAMETERS: HCPCS | Performed by: FAMILY MEDICINE

## 2023-09-14 PROCEDURE — G8427 DOCREV CUR MEDS BY ELIG CLIN: HCPCS | Performed by: FAMILY MEDICINE

## 2023-09-14 PROCEDURE — 90694 VACC AIIV4 NO PRSRV 0.5ML IM: CPT | Performed by: FAMILY MEDICINE

## 2023-09-14 PROCEDURE — 1036F TOBACCO NON-USER: CPT | Performed by: FAMILY MEDICINE

## 2023-09-14 PROCEDURE — G0008 ADMIN INFLUENZA VIRUS VAC: HCPCS | Performed by: FAMILY MEDICINE

## 2023-09-14 PROCEDURE — 1123F ACP DISCUSS/DSCN MKR DOCD: CPT | Performed by: FAMILY MEDICINE

## 2023-09-14 PROCEDURE — 99214 OFFICE O/P EST MOD 30 MIN: CPT | Performed by: FAMILY MEDICINE

## 2023-09-14 ASSESSMENT — ENCOUNTER SYMPTOMS
COUGH: 0
ABDOMINAL PAIN: 1
NAUSEA: 1
SHORTNESS OF BREATH: 0

## 2023-10-15 DIAGNOSIS — Z79.4 TYPE 2 DIABETES MELLITUS WITHOUT COMPLICATION, WITH LONG-TERM CURRENT USE OF INSULIN (HCC): ICD-10-CM

## 2023-10-15 DIAGNOSIS — E11.9 TYPE 2 DIABETES MELLITUS WITHOUT COMPLICATION, WITH LONG-TERM CURRENT USE OF INSULIN (HCC): ICD-10-CM

## 2023-10-17 RX ORDER — INSULIN GLARGINE 100 [IU]/ML
INJECTION, SOLUTION SUBCUTANEOUS
Qty: 12 ML | Refills: 2 | Status: SHIPPED | OUTPATIENT
Start: 2023-10-17

## 2023-10-17 NOTE — TELEPHONE ENCOUNTER
LAST VISIT:   9/14/2023     Future Appointments   Date Time Provider [address]   10/23/2023  8:15 AM Saturnino Shanks MD [facility]   11/1/2023 11:30 AM SANAM Cardoso - CNP [facility] None            RA in [place]

## 2023-10-23 ENCOUNTER — OFFICE VISIT (OUTPATIENT)
Dept: PRIMARY CARE CLINIC | Age: 88
End: 2023-10-23
Payer: MEDICARE

## 2023-10-23 VITALS
SYSTOLIC BLOOD PRESSURE: 128 MMHG | DIASTOLIC BLOOD PRESSURE: 64 MMHG | OXYGEN SATURATION: 100 % | BODY MASS INDEX: 24.71 KG/M2 | WEIGHT: 172.2 LBS | HEART RATE: 62 BPM

## 2023-10-23 DIAGNOSIS — Z79.4 TYPE 2 DIABETES MELLITUS WITH CHRONIC KIDNEY DISEASE, WITH LONG-TERM CURRENT USE OF INSULIN, UNSPECIFIED CKD STAGE (HCC): Primary | ICD-10-CM

## 2023-10-23 DIAGNOSIS — I95.1 ORTHOSTATIC HYPOTENSION: ICD-10-CM

## 2023-10-23 DIAGNOSIS — R35.1 NOCTURIA: ICD-10-CM

## 2023-10-23 DIAGNOSIS — E11.22 TYPE 2 DIABETES MELLITUS WITH CHRONIC KIDNEY DISEASE, WITH LONG-TERM CURRENT USE OF INSULIN, UNSPECIFIED CKD STAGE (HCC): Primary | ICD-10-CM

## 2023-10-23 LAB — HBA1C MFR BLD: 7.8 %

## 2023-10-23 PROCEDURE — G8484 FLU IMMUNIZE NO ADMIN: HCPCS | Performed by: FAMILY MEDICINE

## 2023-10-23 PROCEDURE — 99214 OFFICE O/P EST MOD 30 MIN: CPT | Performed by: FAMILY MEDICINE

## 2023-10-23 PROCEDURE — 3051F HG A1C>EQUAL 7.0%<8.0%: CPT | Performed by: FAMILY MEDICINE

## 2023-10-23 PROCEDURE — 1123F ACP DISCUSS/DSCN MKR DOCD: CPT | Performed by: FAMILY MEDICINE

## 2023-10-23 PROCEDURE — G8420 CALC BMI NORM PARAMETERS: HCPCS | Performed by: FAMILY MEDICINE

## 2023-10-23 PROCEDURE — 1036F TOBACCO NON-USER: CPT | Performed by: FAMILY MEDICINE

## 2023-10-23 PROCEDURE — 83036 HEMOGLOBIN GLYCOSYLATED A1C: CPT | Performed by: FAMILY MEDICINE

## 2023-10-23 PROCEDURE — G8427 DOCREV CUR MEDS BY ELIG CLIN: HCPCS | Performed by: FAMILY MEDICINE

## 2023-10-23 NOTE — PROGRESS NOTES
[address] PRIMARY CARE  [address] [address]  [facility] [address]  Dept: [phone]    Es Patricia is a 80 y.o. male Established patient, who presents today for his medical conditions/complaints as noted below  Chief Complaint   Patient presents with    Diabetes       HPI:     HPI  Has been feeling okay but some days are better than others. No sleeping well. Taking lasix, but seems to work more at night than daytime. Did get some CP a couple of weeks ago but went away pretty quickly- did not need NTG. Also has had several episodes of LH. No spinning. BP was below 100 each time that happened. Normally runs 120-150's in the mornings. Echo and holter reviewed.        Reviewed prior notes:  None  Reviewed previous:  Labs and Imaging    Hemoglobin A1C (%)   Date Value   10/23/2023 7.8   06/26/2023 8.1   03/24/2023 6.5             ( goal A1C is < 7)   No components found for: \"LABMICR\"  LDL Cholesterol (mg/dL)   Date Value   01/26/2018 39   10/05/2015 51     LDL Calculated (mg/dL)   Date Value   02/08/2021 51   03/06/2018 54   10/18/2016 70       (goal LDL is <100)   AST (U/L)   Date Value   08/23/2022 22     ALT (U/L)   Date Value   08/23/2022 12     BUN (mg/dL)   Date Value   05/11/2023 29 (H)     BP Readings from Last 3 Encounters:   10/23/23 128/64   09/14/23 118/66   08/09/23 114/68          (goal 140/90)    Past Medical History:   Diagnosis Date    Acute myocardial ischemia ([address]) 12/11/2015    Acute sinusitis 12/11/2015    Allergic rhinitis     Anticoagulant long-term use     Anxiety     Atrial fibrillation (HCC)     CAD (coronary artery disease)     Cerebral artery occlusion with cerebral infarction [facility])     Cerebrovascular disease     Chest pain 10/05/2015    Chronic back pain     Diabetes mellitus (HCC)     GERD (gastroesophageal reflux disease)     H/O cataract     Hearing loss     Hyperlipidemia     Hypertension     MI (myocardial infarction) ([address]) 1985

## 2023-10-23 NOTE — ASSESSMENT & PLAN NOTE
History and Physical      Patient Name: Timmy Brannon   Patient ID: [id number]   Sex: Male   YOB: 1985    Primary Care Provider: Timmy Anne MD    Visit Date: August 18, 2020    Provider: Dave Thurston MD   [facility]   Location Address: [address] [place] [address]  [id number]   Location Phone: [phone]          Chief Complaint  · Outpatient History & Physical / Surgical Orders      History Of Present Illness  [facility]  Outpatient History and Physical Surgical Orders  Preadmission Location: LOCATION Preadmission Time: TIME   Which Facility: [facility] Surgery Date: 08/19/2020 Preadmission Testing Date: 8/18/2020   Patient's Name: Timmy Brannon YOB: At goal, continue current medications- if under 7.5 has too many low BS episodes. 

## 2023-10-30 DIAGNOSIS — F41.9 ANXIETY: ICD-10-CM

## 2023-10-30 RX ORDER — LORAZEPAM 0.5 MG/1
0.5 TABLET ORAL EVERY 6 HOURS PRN
Qty: 60 TABLET | Refills: 0 | Status: SHIPPED | OUTPATIENT
Start: 2023-10-30 | End: 2023-12-29

## 2023-10-30 NOTE — TELEPHONE ENCOUNTER
LAST VISIT:   10/23/2023     Future Appointments   Date Time Provider [address]   11/1/2023 11:30 AM SANAM Lima - CNP [facility] None   1/25/2024 10:00 AM Ela Shanks MD [facility]

## 2023-11-03 ENCOUNTER — TELEPHONE (OUTPATIENT)
Dept: PRIMARY CARE CLINIC | Age: 88
End: 2023-11-03

## 2023-11-03 DIAGNOSIS — I10 ESSENTIAL HYPERTENSION: ICD-10-CM

## 2023-11-03 DIAGNOSIS — N18.32 STAGE 3B CHRONIC KIDNEY DISEASE (HCC): ICD-10-CM

## 2023-11-03 DIAGNOSIS — I95.1 ORTHOSTATIC HYPOTENSION: ICD-10-CM

## 2023-11-03 DIAGNOSIS — R60.0 BILATERAL LOWER EXTREMITY EDEMA: ICD-10-CM

## 2023-11-03 RX ORDER — ERGOCALCIFEROL 1.25 MG/1
50000 CAPSULE ORAL WEEKLY
Qty: 12 CAPSULE | Refills: 1 | Status: SHIPPED | OUTPATIENT
Start: 2023-11-03

## 2023-11-03 NOTE — TELEPHONE ENCOUNTER
----- Message from Dee Dee Santos sent at 11/3/2023 10:57 AM EDT -----  Regarding: New precription. Contact: [phone]  I saw you on Nov 1st and you prescribed Ergocalciferol. You sent the prescription to [facility] and they will not fill it because it's not on their formulary. Would you please resend it to the [facility]  in [place].     Thank you   Jett White

## 2023-11-22 DIAGNOSIS — E11.9 TYPE 2 DIABETES MELLITUS WITHOUT COMPLICATION, WITH LONG-TERM CURRENT USE OF INSULIN (HCC): ICD-10-CM

## 2023-11-22 DIAGNOSIS — Z79.4 TYPE 2 DIABETES MELLITUS WITHOUT COMPLICATION, WITH LONG-TERM CURRENT USE OF INSULIN (HCC): ICD-10-CM

## 2023-11-22 RX ORDER — INSULIN GLARGINE 100 [IU]/ML
INJECTION, SOLUTION SUBCUTANEOUS
Qty: 5 ADJUSTABLE DOSE PRE-FILLED PEN SYRINGE | Refills: 3 | Status: SHIPPED | OUTPATIENT
Start: 2023-11-22

## 2023-11-22 NOTE — TELEPHONE ENCOUNTER
LAST VISIT:   10/23/2023     Future Appointments   Date Time Provider [address]   1/25/2024 10:00 AM [facility]   3/13/2024 10:50 AM SANAM Britt - CNP [facility] None

## 2023-12-02 DIAGNOSIS — F41.9 ANXIETY: ICD-10-CM

## 2023-12-04 RX ORDER — LORAZEPAM 0.5 MG/1
TABLET ORAL
Qty: 90 TABLET | Refills: 0 | Status: SHIPPED | OUTPATIENT
Start: 2023-12-04 | End: 2024-02-02

## 2024-01-23 ASSESSMENT — PATIENT HEALTH QUESTIONNAIRE - PHQ9
1. LITTLE INTEREST OR PLEASURE IN DOING THINGS: MORE THAN HALF THE DAYS
SUM OF ALL RESPONSES TO PHQ9 QUESTIONS 1 & 2: 3
SUM OF ALL RESPONSES TO PHQ9 QUESTIONS 1 & 2: 3
3. TROUBLE FALLING OR STAYING ASLEEP: NEARLY EVERY DAY
4. FEELING TIRED OR HAVING LITTLE ENERGY: 3
9. THOUGHTS THAT YOU WOULD BE BETTER OFF DEAD, OR OF HURTING YOURSELF: NOT AT ALL
1. LITTLE INTEREST OR PLEASURE IN DOING THINGS: 2
10. IF YOU CHECKED OFF ANY PROBLEMS, HOW DIFFICULT HAVE THESE PROBLEMS MADE IT FOR YOU TO DO YOUR WORK, TAKE CARE OF THINGS AT HOME, OR GET ALONG WITH OTHER PEOPLE: 1
9. THOUGHTS THAT YOU WOULD BE BETTER OFF DEAD, OR OF HURTING YOURSELF: 0
2. FEELING DOWN, DEPRESSED OR HOPELESS: SEVERAL DAYS
SUM OF ALL RESPONSES TO PHQ QUESTIONS 1-9: 14
4. FEELING TIRED OR HAVING LITTLE ENERGY: NEARLY EVERY DAY
5. POOR APPETITE OR OVEREATING: 0
7. TROUBLE CONCENTRATING ON THINGS, SUCH AS READING THE NEWSPAPER OR WATCHING TELEVISION: 1
8. MOVING OR SPEAKING SO SLOWLY THAT OTHER PEOPLE COULD HAVE NOTICED. OR THE OPPOSITE - BEING SO FIDGETY OR RESTLESS THAT YOU HAVE BEEN MOVING AROUND A LOT MORE THAN USUAL: NEARLY EVERY DAY
6. FEELING BAD ABOUT YOURSELF - OR THAT YOU ARE A FAILURE OR HAVE LET YOURSELF OR YOUR FAMILY DOWN: SEVERAL DAYS
10. IF YOU CHECKED OFF ANY PROBLEMS, HOW DIFFICULT HAVE THESE PROBLEMS MADE IT FOR YOU TO DO YOUR WORK, TAKE CARE OF THINGS AT HOME, OR GET ALONG WITH OTHER PEOPLE: SOMEWHAT DIFFICULT
3. TROUBLE FALLING OR STAYING ASLEEP: 3
6. FEELING BAD ABOUT YOURSELF - OR THAT YOU ARE A FAILURE OR HAVE LET YOURSELF OR YOUR FAMILY DOWN: 1
SUM OF ALL RESPONSES TO PHQ QUESTIONS 1-9: 14
5. POOR APPETITE OR OVEREATING: NOT AT ALL
SUM OF ALL RESPONSES TO PHQ QUESTIONS 1-9: 14
2. FEELING DOWN, DEPRESSED OR HOPELESS: 1
7. TROUBLE CONCENTRATING ON THINGS, SUCH AS READING THE NEWSPAPER OR WATCHING TELEVISION: SEVERAL DAYS
SUM OF ALL RESPONSES TO PHQ QUESTIONS 1-9: 14
8. MOVING OR SPEAKING SO SLOWLY THAT OTHER PEOPLE COULD HAVE NOTICED. OR THE OPPOSITE, BEING SO FIGETY OR RESTLESS THAT YOU HAVE BEEN MOVING AROUND A LOT MORE THAN USUAL: 3
SUM OF ALL RESPONSES TO PHQ QUESTIONS 1-9: 14

## 2024-01-26 ENCOUNTER — OFFICE VISIT (OUTPATIENT)
Dept: PRIMARY CARE CLINIC | Age: 89
End: 2024-01-26

## 2024-01-26 VITALS
DIASTOLIC BLOOD PRESSURE: 66 MMHG | HEIGHT: 70 IN | BODY MASS INDEX: 25.14 KG/M2 | HEART RATE: 58 BPM | SYSTOLIC BLOOD PRESSURE: 138 MMHG | WEIGHT: 175.6 LBS | OXYGEN SATURATION: 98 %

## 2024-01-26 DIAGNOSIS — Z79.4 TYPE 2 DIABETES MELLITUS WITH CHRONIC KIDNEY DISEASE, WITH LONG-TERM CURRENT USE OF INSULIN, UNSPECIFIED CKD STAGE (HCC): Primary | ICD-10-CM

## 2024-01-26 DIAGNOSIS — E11.22 TYPE 2 DIABETES MELLITUS WITH CHRONIC KIDNEY DISEASE, WITH LONG-TERM CURRENT USE OF INSULIN, UNSPECIFIED CKD STAGE (HCC): Primary | ICD-10-CM

## 2024-01-26 DIAGNOSIS — N18.31 STAGE 3A CHRONIC KIDNEY DISEASE (HCC): ICD-10-CM

## 2024-01-26 DIAGNOSIS — E03.9 HYPOTHYROIDISM (ACQUIRED): ICD-10-CM

## 2024-01-26 DIAGNOSIS — E87.5 HYPERKALEMIA: ICD-10-CM

## 2024-01-26 LAB — HBA1C MFR BLD: 7.8 %

## 2024-01-26 RX ORDER — FUROSEMIDE 20 MG/1
20 TABLET ORAL DAILY
Qty: 90 TABLET | Refills: 3 | Status: SHIPPED | OUTPATIENT
Start: 2024-01-26 | End: 2025-01-20

## 2024-01-26 RX ORDER — LEVOTHYROXINE SODIUM 0.05 MG/1
50 TABLET ORAL DAILY
Qty: 90 TABLET | Refills: 1 | Status: SHIPPED | OUTPATIENT
Start: 2024-01-26

## 2024-01-26 RX ORDER — VALSARTAN 80 MG/1
80 TABLET ORAL DAILY
Qty: 90 TABLET | Refills: 3 | Status: SHIPPED | OUTPATIENT
Start: 2024-01-26

## 2024-01-26 RX ORDER — AMIODARONE HYDROCHLORIDE 200 MG/1
200 TABLET ORAL DAILY
Qty: 90 TABLET | Refills: 3 | Status: SHIPPED | OUTPATIENT
Start: 2024-01-26

## 2024-01-26 NOTE — PROGRESS NOTES
tablet by mouth daily 10 tablet 0    rivaroxaban (XARELTO) 15 MG TABS tablet Take 1 tablet by mouth daily      Blood Glucose Monitoring Suppl (FREESTYLE FREEDOM LITE) w/Device KIT 1 kit by Does not apply route daily as needed (check sugars bid and prn) 1 kit 0    acetaminophen (TYLENOL) 500 MG tablet Take 1 tablet by mouth every 6 hours as needed for Pain      Coenzyme Q10 (CO Q-10) 30 MG CAPS Take 30 mg by mouth daily      Ascorbic Acid (VITAMIN C) 250 MG tablet Take 2 tablets by mouth daily      nitroGLYCERIN (NITROSTAT) 0.4 MG SL tablet Place 1 tablet under the tongue every 5 minutes as needed for Chest pain (Patient not taking: Reported on 1/26/2024) 25 tablet 1     No current facility-administered medications for this visit.     Allergies   Allergen Reactions    Sulfa Antibiotics Anaphylaxis       Health Maintenance   Topic Date Due    DTaP/Tdap/Td vaccine (1 - Tdap) Never done    Shingles vaccine (1 of 2) Never done    Respiratory Syncytial Virus (RSV) Pregnant or age 60 yrs+ (1 - 1-dose 60+ series) Never done    Lipids  02/08/2022    COVID-19 Vaccine (5 - 2023-24 season) 09/01/2023    Annual Wellness Visit (Medicare)  03/24/2024    Depression Monitoring  01/23/2025    Flu vaccine  Completed    Pneumococcal 65+ years Vaccine  Completed    Hepatitis A vaccine  Aged Out    Hepatitis B vaccine  Aged Out    Hib vaccine  Aged Out    Polio vaccine  Aged Out    Meningococcal (ACWY) vaccine  Aged Out    Depression Screen  Discontinued       Subjective:     Review of Systems    Objective:     /66   Pulse 58   Ht 1.778 m (5' 10\")   Wt 79.7 kg (175 lb 9.6 oz)   SpO2 98%   BMI 25.20 kg/m²   Physical Exam  Vitals reviewed.   Constitutional:       Appearance: Normal appearance.   HENT:      Head: Normocephalic and atraumatic.   Eyes:      General:         Right eye: No discharge.         Left eye: No discharge.      Conjunctiva/sclera: Conjunctivae normal.   Pulmonary:      Effort: Pulmonary effort is normal. No

## 2024-02-22 DIAGNOSIS — F41.9 ANXIETY: ICD-10-CM

## 2024-02-23 RX ORDER — LORAZEPAM 0.5 MG/1
TABLET ORAL
Qty: 60 TABLET | Refills: 0 | Status: SHIPPED | OUTPATIENT
Start: 2024-02-23 | End: 2024-03-24

## 2024-03-07 DIAGNOSIS — E03.9 HYPOTHYROIDISM (ACQUIRED): ICD-10-CM

## 2024-03-15 LAB — TSH SERPL DL<=0.05 MIU/L-ACNC: 4.36 UIU/ML (ref 0.27–4.2)

## 2024-03-15 NOTE — RESULT ENCOUNTER NOTE
Adv pt thyroid level is just slightly low- could increase dose or could have him take 2 pills 3 days a week and 1 pill 4 days.  Whichever would be easier for him

## 2024-03-18 DIAGNOSIS — E03.9 HYPOTHYROIDISM (ACQUIRED): ICD-10-CM

## 2024-03-18 RX ORDER — LEVOTHYROXINE SODIUM 0.05 MG/1
50 TABLET ORAL DAILY
Qty: 90 TABLET | Refills: 3 | Status: SHIPPED | OUTPATIENT
Start: 2024-03-18

## 2024-03-18 RX ORDER — PEN NEEDLE, DIABETIC 29 G X1/2"
NEEDLE, DISPOSABLE MISCELLANEOUS
Qty: 100 EACH | Refills: 3 | Status: SHIPPED | OUTPATIENT
Start: 2024-03-18

## 2024-03-18 RX ORDER — ATORVASTATIN CALCIUM 80 MG/1
80 TABLET, FILM COATED ORAL NIGHTLY
Qty: 90 TABLET | Refills: 3 | Status: SHIPPED | OUTPATIENT
Start: 2024-03-18

## 2024-03-18 RX ORDER — VALSARTAN 80 MG/1
80 TABLET ORAL DAILY
Qty: 90 TABLET | Refills: 3 | Status: SHIPPED | OUTPATIENT
Start: 2024-03-18

## 2024-04-16 DIAGNOSIS — R60.0 BILATERAL LOWER EXTREMITY EDEMA: ICD-10-CM

## 2024-04-16 DIAGNOSIS — I10 ESSENTIAL HYPERTENSION: ICD-10-CM

## 2024-04-16 DIAGNOSIS — I95.1 ORTHOSTATIC HYPOTENSION: ICD-10-CM

## 2024-04-16 DIAGNOSIS — N18.32 STAGE 3B CHRONIC KIDNEY DISEASE (HCC): ICD-10-CM

## 2024-04-16 RX ORDER — ERGOCALCIFEROL 1.25 MG/1
50000 CAPSULE ORAL WEEKLY
Qty: 12 CAPSULE | Refills: 1 | Status: SHIPPED | OUTPATIENT
Start: 2024-04-16

## 2024-04-30 SDOH — ECONOMIC STABILITY: FOOD INSECURITY: WITHIN THE PAST 12 MONTHS, YOU WORRIED THAT YOUR FOOD WOULD RUN OUT BEFORE YOU GOT MONEY TO BUY MORE.: NEVER TRUE

## 2024-04-30 SDOH — ECONOMIC STABILITY: FOOD INSECURITY: WITHIN THE PAST 12 MONTHS, THE FOOD YOU BOUGHT JUST DIDN'T LAST AND YOU DIDN'T HAVE MONEY TO GET MORE.: NEVER TRUE

## 2024-04-30 SDOH — ECONOMIC STABILITY: INCOME INSECURITY: HOW HARD IS IT FOR YOU TO PAY FOR THE VERY BASICS LIKE FOOD, HOUSING, MEDICAL CARE, AND HEATING?: NOT HARD AT ALL

## 2024-05-03 ENCOUNTER — OFFICE VISIT (OUTPATIENT)
Dept: PRIMARY CARE CLINIC | Age: 89
End: 2024-05-03

## 2024-05-03 VITALS
SYSTOLIC BLOOD PRESSURE: 144 MMHG | HEIGHT: 70 IN | BODY MASS INDEX: 25.17 KG/M2 | HEART RATE: 50 BPM | WEIGHT: 175.8 LBS | OXYGEN SATURATION: 99 % | DIASTOLIC BLOOD PRESSURE: 58 MMHG

## 2024-05-03 DIAGNOSIS — E11.22 TYPE 2 DIABETES MELLITUS WITH CHRONIC KIDNEY DISEASE, WITH LONG-TERM CURRENT USE OF INSULIN, UNSPECIFIED CKD STAGE (HCC): ICD-10-CM

## 2024-05-03 DIAGNOSIS — E27.8 ADRENAL MASS (HCC): ICD-10-CM

## 2024-05-03 DIAGNOSIS — I48.91 ATRIAL FIBRILLATION WITH RVR (HCC): ICD-10-CM

## 2024-05-03 DIAGNOSIS — Z79.4 TYPE 2 DIABETES MELLITUS WITH CHRONIC KIDNEY DISEASE, WITH LONG-TERM CURRENT USE OF INSULIN, UNSPECIFIED CKD STAGE (HCC): ICD-10-CM

## 2024-05-03 DIAGNOSIS — E11.40 CONTROLLED TYPE 2 DIABETES WITH NEUROPATHY (HCC): Primary | ICD-10-CM

## 2024-05-03 DIAGNOSIS — I50.9 ACUTE ON CHRONIC CONGESTIVE HEART FAILURE, UNSPECIFIED HEART FAILURE TYPE (HCC): ICD-10-CM

## 2024-05-03 DIAGNOSIS — I95.1 ORTHOSTATIC HYPOTENSION: ICD-10-CM

## 2024-05-03 DIAGNOSIS — E03.9 HYPOTHYROIDISM (ACQUIRED): ICD-10-CM

## 2024-05-03 DIAGNOSIS — I20.9 ANGINA PECTORIS, UNSPECIFIED (HCC): ICD-10-CM

## 2024-05-03 LAB — HBA1C MFR BLD: 8.2 %

## 2024-05-03 ASSESSMENT — ENCOUNTER SYMPTOMS
WHEEZING: 0
VOMITING: 0
ABDOMINAL PAIN: 0
EYE REDNESS: 0
DIARRHEA: 0
COUGH: 0
BACK PAIN: 1
SORE THROAT: 0
RHINORRHEA: 0
NAUSEA: 0
EYE DISCHARGE: 0
SHORTNESS OF BREATH: 0

## 2024-05-03 NOTE — ASSESSMENT & PLAN NOTE
Has SOB if stops the lasix, but has orthostatic hypotension.  Continues with support socks and monitoring symptoms.

## 2024-05-03 NOTE — PROGRESS NOTES
[facility]  [facility] PRIMARY CARE  [address] [address]  Dept: [phone]    Dawson Childers is a 88 y.o. male Established patient, who presents today for his medical conditions/complaints as noted below  Chief Complaint   Patient presents with    Diabetes     Last A1c 7.8       HPI:     Diabetes  Hypoglycemia symptoms include headaches (occas). Pertinent negatives for diabetes include no chest pain.     Pt states sugars are running high (occas low) and BP drops too low.  Getting lightheaded a lot, but sits down and only lasts a few minutes.    Reviewed prior notes:  None  Reviewed previous:  Labs and Imaging    Hemoglobin A1C (%)   Date Value   05/03/2024 8.2   01/26/2024 7.8   10/23/2023 7.8             ( goal A1C is < 7)   No components found for: \"LABMICR\"  No components found for: \"LDLCHOLESTEROL\", \"LDLCALC\"    (goal LDL is <100)   AST (U/L)   Date Value   08/23/2022 22     ALT (U/L)   Date Value   08/23/2022 12     BUN (mg/dL)   Date Value   03/07/2024 32 (H)     BP Readings from Last 3 Encounters:   05/03/24 (!) 144/58   03/13/24 (!) 140/70   01/26/24 138/66          (goal 140/90)    Past Medical History:   Diagnosis Date    Acute myocardial ischemia (HCC) 12/11/2015    Acute sinusitis 12/11/2015    Allergic rhinitis     Anticoagulant long-term use     Anxiety     Atrial fibrillation (HCC)     CAD (coronary artery disease)     Cerebral artery occlusion with cerebral infarction (HCC)     Cerebrovascular disease     Chest pain 10/05/2015    Chronic back pain     Diabetes mellitus (HCC)     GERD (gastroesophageal reflux disease)     H/O cataract     Hearing loss     Hyperlipidemia     Hypertension     MI (myocardial infarction) (HCC) 1985    Neck stiffness 12/11/2015    Neuropathy     Osteoarthritis     Partial small bowel obstruction (HCC) 11/07/2019    Peyronie's disease     Restless legs syndrome     Rotator cuff tendonitis 12/11/2015    TIA (transient

## 2024-05-14 DIAGNOSIS — F41.9 ANXIETY: ICD-10-CM

## 2024-05-15 RX ORDER — LORAZEPAM 0.5 MG/1
TABLET ORAL
Qty: 60 TABLET | Refills: 0 | Status: SHIPPED | OUTPATIENT
Start: 2024-05-15 | End: 2024-06-14

## 2024-05-18 DIAGNOSIS — Z79.4 TYPE 2 DIABETES MELLITUS WITHOUT COMPLICATION, WITH LONG-TERM CURRENT USE OF INSULIN (HCC): ICD-10-CM

## 2024-05-18 DIAGNOSIS — E11.9 TYPE 2 DIABETES MELLITUS WITHOUT COMPLICATION, WITH LONG-TERM CURRENT USE OF INSULIN (HCC): ICD-10-CM

## 2024-05-20 RX ORDER — INSULIN GLARGINE 100 [IU]/ML
INJECTION, SOLUTION SUBCUTANEOUS
Qty: 12 ML | Refills: 3 | Status: SHIPPED | OUTPATIENT
Start: 2024-05-20

## 2024-06-29 DIAGNOSIS — E03.9 HYPOTHYROIDISM (ACQUIRED): ICD-10-CM

## 2024-07-01 RX ORDER — LEVOTHYROXINE SODIUM 0.05 MG/1
50 TABLET ORAL DAILY
Qty: 90 TABLET | Refills: 3 | Status: SHIPPED | OUTPATIENT
Start: 2024-07-01

## 2024-07-01 NOTE — TELEPHONE ENCOUNTER
LAST VISIT:   5/3/2024     Future Appointments   Date Time Provider Department Center   8/7/2024  1:30 PM [facility]   9/11/2024 10:30 AM Shirlene Boss APRN - CNP [facility] None       Pharmacy verified? Yes       [facility] - YUNIEL Martínez - [facility] - P [phone] - F [phone]  [facility]  [place] [address]  Phone: [phone] Fax: [phone]

## 2024-07-12 DIAGNOSIS — E11.9 TYPE 2 DIABETES MELLITUS WITHOUT COMPLICATION, WITH LONG-TERM CURRENT USE OF INSULIN (HCC): ICD-10-CM

## 2024-07-12 DIAGNOSIS — Z79.4 TYPE 2 DIABETES MELLITUS WITHOUT COMPLICATION, WITH LONG-TERM CURRENT USE OF INSULIN (HCC): ICD-10-CM

## 2024-07-12 RX ORDER — LANCETS
EACH MISCELLANEOUS
Qty: 102 EACH | Refills: 5 | Status: SHIPPED | OUTPATIENT
Start: 2024-07-12

## 2024-07-12 RX ORDER — BLOOD SUGAR DIAGNOSTIC
STRIP MISCELLANEOUS
Qty: 150 STRIP | Refills: 5 | Status: SHIPPED | OUTPATIENT
Start: 2024-07-12

## 2024-07-16 DIAGNOSIS — E11.9 TYPE 2 DIABETES MELLITUS WITHOUT COMPLICATION, WITH LONG-TERM CURRENT USE OF INSULIN (HCC): ICD-10-CM

## 2024-07-16 DIAGNOSIS — Z79.4 TYPE 2 DIABETES MELLITUS WITHOUT COMPLICATION, WITH LONG-TERM CURRENT USE OF INSULIN (HCC): ICD-10-CM

## 2024-07-16 RX ORDER — BLOOD SUGAR DIAGNOSTIC
STRIP MISCELLANEOUS
Qty: 150 STRIP | Refills: 5 | OUTPATIENT
Start: 2024-07-16

## 2024-07-16 RX ORDER — LANCETS
EACH MISCELLANEOUS
Qty: 102 EACH | Refills: 5 | OUTPATIENT
Start: 2024-07-16

## 2024-07-31 RX ORDER — FUROSEMIDE 20 MG/1
20 TABLET ORAL DAILY
Qty: 90 TABLET | Refills: 3 | Status: SHIPPED | OUTPATIENT
Start: 2024-07-31 | End: 2025-07-26

## 2024-07-31 NOTE — TELEPHONE ENCOUNTER
LAST VISIT:   5/3/2024     Future Appointments   Date Time Provider Department Center   9/6/2024  1:30 PM [facility]   9/11/2024 10:30 AM Shirlene Boss APRN - CNP [facility] None       Pharmacy verified? Yes       [facility] - [place] - [facility] - P [phone] - F [phone]  [facility]  [place] [address]  Phone: [phone] Fax: [phone]

## 2024-08-04 DIAGNOSIS — Z79.4 TYPE 2 DIABETES MELLITUS WITHOUT COMPLICATION, WITH LONG-TERM CURRENT USE OF INSULIN (HCC): ICD-10-CM

## 2024-08-04 DIAGNOSIS — E11.9 TYPE 2 DIABETES MELLITUS WITHOUT COMPLICATION, WITH LONG-TERM CURRENT USE OF INSULIN (HCC): ICD-10-CM

## 2024-08-05 DIAGNOSIS — Z79.4 TYPE 2 DIABETES MELLITUS WITHOUT COMPLICATION, WITH LONG-TERM CURRENT USE OF INSULIN (HCC): ICD-10-CM

## 2024-08-05 DIAGNOSIS — F41.9 ANXIETY: ICD-10-CM

## 2024-08-05 DIAGNOSIS — E11.9 TYPE 2 DIABETES MELLITUS WITHOUT COMPLICATION, WITH LONG-TERM CURRENT USE OF INSULIN (HCC): ICD-10-CM

## 2024-08-05 RX ORDER — LANCETS
EACH MISCELLANEOUS
Qty: 102 EACH | Refills: 5 | Status: SHIPPED | OUTPATIENT
Start: 2024-08-05

## 2024-08-05 RX ORDER — LORAZEPAM 0.5 MG/1
TABLET ORAL
Qty: 60 TABLET | Refills: 0 | Status: SHIPPED | OUTPATIENT
Start: 2024-08-05 | End: 2024-10-05

## 2024-08-26 DIAGNOSIS — E11.9 TYPE 2 DIABETES MELLITUS WITHOUT COMPLICATION, WITH LONG-TERM CURRENT USE OF INSULIN (HCC): ICD-10-CM

## 2024-08-26 DIAGNOSIS — Z79.4 TYPE 2 DIABETES MELLITUS WITHOUT COMPLICATION, WITH LONG-TERM CURRENT USE OF INSULIN (HCC): ICD-10-CM

## 2024-08-26 RX ORDER — VALSARTAN 80 MG/1
80 TABLET ORAL DAILY
Qty: 90 TABLET | Refills: 3 | Status: SHIPPED | OUTPATIENT
Start: 2024-08-26

## 2024-08-26 RX ORDER — INSULIN GLARGINE 100 [IU]/ML
INJECTION, SOLUTION SUBCUTANEOUS
Qty: 12 ML | Refills: 3 | Status: SHIPPED | OUTPATIENT
Start: 2024-08-26

## 2024-08-26 RX ORDER — METHYLPREDNISOLONE SODIUM SUCCINATE 125 MG/2ML
INJECTION, POWDER, FOR SOLUTION INTRAMUSCULAR; INTRAVENOUS
Qty: 100 EACH | Refills: 3 | Status: SHIPPED | OUTPATIENT
Start: 2024-08-26

## 2024-09-06 ENCOUNTER — OFFICE VISIT (OUTPATIENT)
Dept: PRIMARY CARE CLINIC | Age: 89
End: 2024-09-06
Payer: MEDICARE

## 2024-09-06 VITALS
HEART RATE: 57 BPM | BODY MASS INDEX: 25.6 KG/M2 | DIASTOLIC BLOOD PRESSURE: 60 MMHG | HEIGHT: 70 IN | WEIGHT: 178.8 LBS | SYSTOLIC BLOOD PRESSURE: 152 MMHG | OXYGEN SATURATION: 97 %

## 2024-09-06 DIAGNOSIS — Z23 NEED FOR VACCINATION: Primary | ICD-10-CM

## 2024-09-06 DIAGNOSIS — E11.9 TYPE 2 DIABETES MELLITUS WITHOUT COMPLICATION, WITH LONG-TERM CURRENT USE OF INSULIN (HCC): ICD-10-CM

## 2024-09-06 DIAGNOSIS — Z79.4 TYPE 2 DIABETES MELLITUS WITHOUT COMPLICATION, WITH LONG-TERM CURRENT USE OF INSULIN (HCC): ICD-10-CM

## 2024-09-06 DIAGNOSIS — Z00.00 MEDICARE ANNUAL WELLNESS VISIT, SUBSEQUENT: ICD-10-CM

## 2024-09-06 LAB — HBA1C MFR BLD: 7.1 %

## 2024-09-06 PROCEDURE — 3051F HG A1C>EQUAL 7.0%<8.0%: CPT | Performed by: FAMILY MEDICINE

## 2024-09-06 PROCEDURE — 1123F ACP DISCUSS/DSCN MKR DOCD: CPT | Performed by: FAMILY MEDICINE

## 2024-09-06 PROCEDURE — G0008 ADMIN INFLUENZA VIRUS VAC: HCPCS | Performed by: FAMILY MEDICINE

## 2024-09-06 PROCEDURE — 90653 IIV ADJUVANT VACCINE IM: CPT | Performed by: FAMILY MEDICINE

## 2024-09-06 PROCEDURE — G0439 PPPS, SUBSEQ VISIT: HCPCS | Performed by: FAMILY MEDICINE

## 2024-09-06 PROCEDURE — 83036 HEMOGLOBIN GLYCOSYLATED A1C: CPT | Performed by: FAMILY MEDICINE

## 2024-09-06 RX ORDER — MIDODRINE HYDROCHLORIDE 5 MG/1
2.5 TABLET ORAL 3 TIMES DAILY PRN
Qty: 30 TABLET | Refills: 0 | Status: SHIPPED | OUTPATIENT
Start: 2024-09-06

## 2024-09-06 SDOH — HEALTH STABILITY: PHYSICAL HEALTH
ON AVERAGE, HOW MANY DAYS PER WEEK DO YOU ENGAGE IN MODERATE TO STRENUOUS EXERCISE (LIKE A BRISK WALK)?: PATIENT DECLINED

## 2024-09-06 ASSESSMENT — PATIENT HEALTH QUESTIONNAIRE - PHQ9
SUM OF ALL RESPONSES TO PHQ QUESTIONS 1-9: 1
1. LITTLE INTEREST OR PLEASURE IN DOING THINGS: SEVERAL DAYS
SUM OF ALL RESPONSES TO PHQ QUESTIONS 1-9: 1
SUM OF ALL RESPONSES TO PHQ9 QUESTIONS 1 & 2: 1
2. FEELING DOWN, DEPRESSED OR HOPELESS: NOT AT ALL

## 2024-09-06 ASSESSMENT — LIFESTYLE VARIABLES
HOW OFTEN DO YOU HAVE A DRINK CONTAINING ALCOHOL: 1
HOW OFTEN DO YOU HAVE A DRINK CONTAINING ALCOHOL: NEVER
HOW MANY STANDARD DRINKS CONTAINING ALCOHOL DO YOU HAVE ON A TYPICAL DAY: PATIENT DOES NOT DRINK

## 2024-09-10 NOTE — FLOWSHEET NOTE
Ivf restarted at 125 per  esu orders, zofran given for nausea 0 = swallows foods/liquids without difficulty

## 2024-09-11 DIAGNOSIS — E11.9 TYPE 2 DIABETES MELLITUS WITHOUT COMPLICATION, WITH LONG-TERM CURRENT USE OF INSULIN (HCC): ICD-10-CM

## 2024-09-11 DIAGNOSIS — Z79.4 TYPE 2 DIABETES MELLITUS WITHOUT COMPLICATION, WITH LONG-TERM CURRENT USE OF INSULIN (HCC): ICD-10-CM

## 2024-09-11 LAB
ALBUMIN/GLOBULIN RATIO: 1 (ref 1–2.5)
ALBUMIN: 3.8 G/DL (ref 3.5–5.2)
ALP BLD-CCNC: 120 U/L (ref 40–129)
ALT SERPL-CCNC: 27 U/L (ref 10–50)
ANION GAP SERPL CALCULATED.3IONS-SCNC: 10 MMOL/L (ref 9–16)
AST SERPL-CCNC: 37 U/L (ref 10–50)
BILIRUB SERPL-MCNC: 0.3 MG/DL (ref 0–1.2)
BUN BLDV-MCNC: 30 MG/DL (ref 8–23)
CALCIUM SERPL-MCNC: 8.7 MG/DL (ref 8.6–10.4)
CHLORIDE BLD-SCNC: 104 MMOL/L (ref 98–107)
CO2: 23 MMOL/L (ref 20–31)
CREAT SERPL-MCNC: 1.9 MG/DL (ref 0.7–1.2)
GFR, ESTIMATED: 34 ML/MIN/1.73M2
GLUCOSE BLD-MCNC: 156 MG/DL (ref 74–99)
POTASSIUM SERPL-SCNC: 5.4 MMOL/L (ref 3.7–5.3)
SODIUM BLD-SCNC: 137 MMOL/L (ref 136–145)
TOTAL PROTEIN: 6.7 G/DL (ref 6.6–8.7)

## 2024-10-03 DIAGNOSIS — F41.9 ANXIETY: ICD-10-CM

## 2024-10-04 RX ORDER — LORAZEPAM 0.5 MG/1
TABLET ORAL
Qty: 60 TABLET | Refills: 0 | Status: SHIPPED | OUTPATIENT
Start: 2024-10-04 | End: 2024-11-04

## 2024-11-26 RX ORDER — AMIODARONE HYDROCHLORIDE 200 MG/1
200 TABLET ORAL DAILY
Qty: 90 TABLET | Refills: 0 | Status: SHIPPED | OUTPATIENT
Start: 2024-11-26

## 2024-12-04 RX ORDER — METHYLPREDNISOLONE SODIUM SUCCINATE 125 MG/2ML
INJECTION, POWDER, FOR SOLUTION INTRAMUSCULAR; INTRAVENOUS
Qty: 100 EACH | Refills: 3 | Status: CANCELLED | OUTPATIENT
Start: 2024-12-04

## 2024-12-04 NOTE — TELEPHONE ENCOUNTER
Patient comment: Ulticare is not covered by my insurance. It needs to be ordered as BD Ultra-Fine Short Pen Needles     Please advise

## 2024-12-06 DIAGNOSIS — F41.9 ANXIETY: ICD-10-CM

## 2024-12-09 ENCOUNTER — OFFICE VISIT (OUTPATIENT)
Dept: PRIMARY CARE CLINIC | Age: 88
End: 2024-12-09

## 2024-12-09 VITALS
DIASTOLIC BLOOD PRESSURE: 60 MMHG | BODY MASS INDEX: 25.71 KG/M2 | SYSTOLIC BLOOD PRESSURE: 128 MMHG | WEIGHT: 179.6 LBS | OXYGEN SATURATION: 97 % | HEART RATE: 57 BPM | HEIGHT: 70 IN

## 2024-12-09 DIAGNOSIS — E11.40 CONTROLLED TYPE 2 DIABETES WITH NEUROPATHY (HCC): Primary | ICD-10-CM

## 2024-12-09 DIAGNOSIS — Z79.4 TYPE 2 DIABETES MELLITUS WITHOUT COMPLICATION, WITH LONG-TERM CURRENT USE OF INSULIN (HCC): ICD-10-CM

## 2024-12-09 DIAGNOSIS — I50.9 ACUTE ON CHRONIC CONGESTIVE HEART FAILURE, UNSPECIFIED HEART FAILURE TYPE (HCC): ICD-10-CM

## 2024-12-09 DIAGNOSIS — E11.9 TYPE 2 DIABETES MELLITUS WITHOUT COMPLICATION, WITH LONG-TERM CURRENT USE OF INSULIN (HCC): ICD-10-CM

## 2024-12-09 DIAGNOSIS — I48.0 PAROXYSMAL ATRIAL FIBRILLATION (HCC): ICD-10-CM

## 2024-12-09 LAB — HBA1C MFR BLD: 7.4 %

## 2024-12-09 RX ORDER — BLOOD SUGAR DIAGNOSTIC
STRIP MISCELLANEOUS
Qty: 200 STRIP | Refills: 3 | Status: SHIPPED | OUTPATIENT
Start: 2024-12-09

## 2024-12-09 RX ORDER — VALSARTAN 40 MG/1
40 TABLET ORAL EVERY MORNING
COMMUNITY
Start: 2024-10-16 | End: 2024-12-09

## 2024-12-09 RX ORDER — LANCETS
EACH MISCELLANEOUS
Qty: 200 EACH | Refills: 3 | Status: SHIPPED | OUTPATIENT
Start: 2024-12-09

## 2024-12-09 RX ORDER — LORAZEPAM 0.5 MG/1
TABLET ORAL
Qty: 60 TABLET | Refills: 0 | Status: SHIPPED | OUTPATIENT
Start: 2024-12-09 | End: 2025-01-05

## 2024-12-09 ASSESSMENT — PATIENT HEALTH QUESTIONNAIRE - PHQ9
1. LITTLE INTEREST OR PLEASURE IN DOING THINGS: SEVERAL DAYS
SUM OF ALL RESPONSES TO PHQ QUESTIONS 1-9: 2
SUM OF ALL RESPONSES TO PHQ9 QUESTIONS 1 & 2: 2
SUM OF ALL RESPONSES TO PHQ QUESTIONS 1-9: 2
2. FEELING DOWN, DEPRESSED OR HOPELESS: SEVERAL DAYS
SUM OF ALL RESPONSES TO PHQ QUESTIONS 1-9: 2
SUM OF ALL RESPONSES TO PHQ QUESTIONS 1-9: 2

## 2024-12-09 ASSESSMENT — ENCOUNTER SYMPTOMS
CHEST TIGHTNESS: 1
SHORTNESS OF BREATH: 1

## 2024-12-09 NOTE — ASSESSMENT & PLAN NOTE
Taking lasix 20 mg but sometimes feels like it does not work well.  Then all of a sudden her will go a lot.  Not really much difference in his SOB or swelling.

## 2024-12-09 NOTE — ASSESSMENT & PLAN NOTE
Side effects from amiodarone but cardiology wants EP to adjust if possible.  Is supposed to get appt with them.

## 2024-12-09 NOTE — PROGRESS NOTES
[facility]  [facility]  [address]  [place] [address]  Dept: [phone]    Dawson Childers is a 88 y.o. male Established patient, who presents today for his medical conditions/complaints as noted below  Chief Complaint   Patient presents with    Diabetes     Last A1c 7.1    Congestive Heart Failure     Patient would like to discuss       HPI:     Diabetes  Hypoglycemia symptoms include headaches (neck hurting). Pertinent negatives for hypoglycemia include no dizziness. Pertinent negatives for diabetes include no chest pain.   Congestive Heart Failure  Associated symptoms include shortness of breath. Pertinent negatives include no chest pain or palpitations.     SOB and pain are worse than he was here last time.  Tremor is worse and is sleepy all the time.  Cardiology would like to take him off the amiodarone.  BP drops with standing for more than a few minutes.  They decreased his losartan to 40 from 80 and after a few days his BP went back up to 160's to 170's.  He went back to the 80 mg after that.  Legs are also more swollen.      Sugars are occas low, but not consistent.        Reviewed prior notes:  Cardiology  Reviewed previous:  Labs and Imaging    Hemoglobin A1C (%)   Date Value   12/09/2024 7.4   09/06/2024 7.1   05/03/2024 8.2             ( goal A1C is < 7)   No components found for: \"LABMICR\"  No components found for: \"LDLCHOLESTEROL\", \"LDLCALC\"    (goal LDL is <100)   AST (U/L)   Date Value   09/11/2024 37     ALT (U/L)   Date Value   09/11/2024 27     BUN (mg/dL)   Date Value   09/11/2024 30 (H)     BP Readings from Last 3 Encounters:   12/09/24 128/60   09/25/24 (!) 138/58   09/06/24 (!) 152/60          (goal 140/90)    Past Medical History:   Diagnosis Date    Acute myocardial ischemia (HCC) 12/11/2015    Acute sinusitis 12/11/2015    Allergic rhinitis     Anticoagulant long-term use     Anxiety     Atrial fibrillation (HCC)     CAD (coronary

## 2025-01-06 RX ORDER — VALSARTAN 80 MG/1
80 TABLET ORAL DAILY
Qty: 90 TABLET | Refills: 3 | Status: SHIPPED | OUTPATIENT
Start: 2025-01-06

## 2025-02-02 DIAGNOSIS — E03.9 HYPOTHYROIDISM (ACQUIRED): ICD-10-CM

## 2025-02-03 RX ORDER — LEVOTHYROXINE SODIUM 50 UG/1
50 TABLET ORAL DAILY
Qty: 90 TABLET | Refills: 3 | Status: SHIPPED | OUTPATIENT
Start: 2025-02-03

## 2025-02-03 RX ORDER — FUROSEMIDE 20 MG/1
20 TABLET ORAL DAILY
Qty: 90 TABLET | Refills: 3 | Status: SHIPPED | OUTPATIENT
Start: 2025-02-03 | End: 2026-01-29

## 2025-02-10 RX ORDER — FUROSEMIDE 20 MG/1
20 TABLET ORAL DAILY
Qty: 90 TABLET | Refills: 3 | Status: SHIPPED | OUTPATIENT
Start: 2025-02-10 | End: 2026-02-05

## 2025-02-11 DIAGNOSIS — F41.9 ANXIETY: ICD-10-CM

## 2025-02-12 RX ORDER — LORAZEPAM 0.5 MG/1
TABLET ORAL
Qty: 60 TABLET | Refills: 0 | Status: SHIPPED | OUTPATIENT
Start: 2025-02-12 | End: 2025-03-10

## 2025-02-25 SDOH — ECONOMIC STABILITY: FOOD INSECURITY: WITHIN THE PAST 12 MONTHS, THE FOOD YOU BOUGHT JUST DIDN'T LAST AND YOU DIDN'T HAVE MONEY TO GET MORE.: NEVER TRUE

## 2025-02-25 SDOH — ECONOMIC STABILITY: INCOME INSECURITY: IN THE LAST 12 MONTHS, WAS THERE A TIME WHEN YOU WERE NOT ABLE TO PAY THE MORTGAGE OR RENT ON TIME?: NO

## 2025-02-25 SDOH — ECONOMIC STABILITY: FOOD INSECURITY: WITHIN THE PAST 12 MONTHS, YOU WORRIED THAT YOUR FOOD WOULD RUN OUT BEFORE YOU GOT MONEY TO BUY MORE.: NEVER TRUE

## 2025-02-25 SDOH — ECONOMIC STABILITY: TRANSPORTATION INSECURITY
IN THE PAST 12 MONTHS, HAS THE LACK OF TRANSPORTATION KEPT YOU FROM MEDICAL APPOINTMENTS OR FROM GETTING MEDICATIONS?: NO

## 2025-02-25 ASSESSMENT — PATIENT HEALTH QUESTIONNAIRE - PHQ9
SUM OF ALL RESPONSES TO PHQ9 QUESTIONS 1 & 2: 2
SUM OF ALL RESPONSES TO PHQ QUESTIONS 1-9: 2
SUM OF ALL RESPONSES TO PHQ QUESTIONS 1-9: 2
1. LITTLE INTEREST OR PLEASURE IN DOING THINGS: SEVERAL DAYS
2. FEELING DOWN, DEPRESSED OR HOPELESS: SEVERAL DAYS
SUM OF ALL RESPONSES TO PHQ QUESTIONS 1-9: 2
2. FEELING DOWN, DEPRESSED OR HOPELESS: SEVERAL DAYS
SUM OF ALL RESPONSES TO PHQ QUESTIONS 1-9: 2
1. LITTLE INTEREST OR PLEASURE IN DOING THINGS: SEVERAL DAYS

## 2025-03-27 ENCOUNTER — OFFICE VISIT (OUTPATIENT)
Dept: PRIMARY CARE CLINIC | Age: 89
End: 2025-03-27

## 2025-03-27 VITALS
DIASTOLIC BLOOD PRESSURE: 76 MMHG | SYSTOLIC BLOOD PRESSURE: 142 MMHG | OXYGEN SATURATION: 98 % | BODY MASS INDEX: 25.4 KG/M2 | HEART RATE: 72 BPM | WEIGHT: 177.4 LBS | HEIGHT: 70 IN

## 2025-03-27 DIAGNOSIS — D64.9 ANEMIA, UNSPECIFIED TYPE: ICD-10-CM

## 2025-03-27 DIAGNOSIS — E11.9 TYPE 2 DIABETES MELLITUS WITHOUT COMPLICATION, WITH LONG-TERM CURRENT USE OF INSULIN: ICD-10-CM

## 2025-03-27 DIAGNOSIS — Z79.4 TYPE 2 DIABETES MELLITUS WITHOUT COMPLICATION, WITH LONG-TERM CURRENT USE OF INSULIN: ICD-10-CM

## 2025-03-27 DIAGNOSIS — I48.0 PAROXYSMAL ATRIAL FIBRILLATION (HCC): ICD-10-CM

## 2025-03-27 DIAGNOSIS — E11.40 CONTROLLED TYPE 2 DIABETES WITH NEUROPATHY (HCC): Primary | ICD-10-CM

## 2025-03-27 DIAGNOSIS — E11.40 CONTROLLED TYPE 2 DIABETES WITH NEUROPATHY (HCC): ICD-10-CM

## 2025-03-27 LAB
BASOPHILS ABSOLUTE: 0.06 K/UL (ref 0–0.2)
BASOPHILS RELATIVE PERCENT: 1 % (ref 0–2)
EOSINOPHILS ABSOLUTE: 0.18 K/UL (ref 0–0.44)
EOSINOPHILS RELATIVE PERCENT: 3 % (ref 1–4)
FERRITIN: 70 NG/ML
FOLATE: 34.9 NG/ML (ref 4.8–24.2)
HBA1C MFR BLD: 8 %
HCT VFR BLD CALC: 33.4 % (ref 40.7–50.3)
HEMOGLOBIN: 10.1 G/DL (ref 13–17)
IMMATURE GRANULOCYTES %: 0 %
IMMATURE GRANULOCYTES ABSOLUTE: <0.03 K/UL (ref 0–0.3)
IRON % SATURATION: 17 % (ref 20–55)
IRON: 50 UG/DL (ref 61–157)
LYMPHOCYTES ABSOLUTE: 1.79 K/UL (ref 1.1–3.7)
LYMPHOCYTES RELATIVE PERCENT: 25 % (ref 24–43)
MCH RBC QN AUTO: 28.2 PG (ref 25.2–33.5)
MCHC RBC AUTO-ENTMCNC: 30.2 G/DL (ref 28.4–34.8)
MCV RBC AUTO: 93.3 FL (ref 82.6–102.9)
MONOCYTES ABSOLUTE: 0.59 K/UL (ref 0.1–1.2)
MONOCYTES RELATIVE PERCENT: 8 % (ref 3–12)
NEUTROPHILS ABSOLUTE: 4.48 K/UL (ref 1.5–8.1)
NEUTROPHILS RELATIVE PERCENT: 63 % (ref 36–65)
NRBC AUTOMATED: 0 PER 100 WBC
PDW BLD-RTO: 12.8 % (ref 11.8–14.4)
PLATELET # BLD: 247 K/UL (ref 138–453)
PMV BLD AUTO: 11.7 FL (ref 8.1–13.5)
RBC # BLD: 3.58 M/UL (ref 4.21–5.77)
TOTAL IRON BINDING CAPACITY: 301 UG/DL (ref 250–450)
UNSATURATED IRON BINDING CAPACITY: 251 UG/DL (ref 112–347)
VITAMIN B-12: 1510 PG/ML (ref 232–1245)
WBC # BLD: 7.1 K/UL (ref 3.5–11.3)

## 2025-03-27 RX ORDER — LORAZEPAM 0.5 MG/1
0.5 TABLET ORAL EVERY 6 HOURS PRN
COMMUNITY

## 2025-03-27 RX ORDER — INSULIN GLARGINE 100 [IU]/ML
INJECTION, SOLUTION SUBCUTANEOUS
Qty: 12 ML | Refills: 3
Start: 2025-03-27

## 2025-03-27 NOTE — PROGRESS NOTES
[facility]  [facility]  [address] [address]  Dept: [phone]    Dawson Childers is a 89 y.o. male Established patient, who presents today for his medical conditions/complaints as noted below.      Chief Complaint   Patient presents with    Diabetes     Last A1c 7.4. patient checks blood sugar 2x daily.    Discuss Labs     Patient would like to discuss his recent blood work results.       HPI:     History of Present Illness  The patient presents for evaluation of diabetes mellitus, anemia, and cough.    He reports a general sense of malaise, despite the discontinuation of amiodarone by his cardiologist. Blood work conducted prior to a nephrology consultation earlier this month revealed a decrease in hemoglobin and red blood cell count. This is the third or fourth occurrence of this issue, with the first instance dating back 20 years. He has undergone three colonoscopies and endoscopies, all of which were unremarkable. The most recent procedure was performed approximately a year ago. A capsule endoscopy was suggested but has not yet been conducted. His last consultation with Dr. Cosme was four months ago, and he has a scheduled appointment in 05/2025. No signs of bleeding are reported. He has received two iron infusions in the past, which normalized his levels.    Blood glucose levels have been relatively stable at home, with fewer instances of hypoglycemia. His blood glucose level was checked today. He is currently on a regimen of Lantus 15 units administered in the morning.    Shortness of breath is experienced post-exertion and occasional coughing in the evenings, which he attributes to sinus drainage. The cough subsides when he changes position from sitting to lying down or standing.      Hemoglobin A1C (%)   Date Value   03/27/2025 8.0   12/09/2024 7.4   09/06/2024 7.1         Reviewed prior notes:  oncology  Reviewed previous: Labs    LDL

## 2025-03-28 ENCOUNTER — RESULTS FOLLOW-UP (OUTPATIENT)
Dept: PRIMARY CARE CLINIC | Age: 89
End: 2025-03-28

## 2025-03-31 DIAGNOSIS — Z79.4 TYPE 2 DIABETES MELLITUS WITHOUT COMPLICATION, WITH LONG-TERM CURRENT USE OF INSULIN: ICD-10-CM

## 2025-03-31 DIAGNOSIS — E11.9 TYPE 2 DIABETES MELLITUS WITHOUT COMPLICATION, WITH LONG-TERM CURRENT USE OF INSULIN: ICD-10-CM

## 2025-03-31 RX ORDER — LANCETS
EACH MISCELLANEOUS
Qty: 200 EACH | Refills: 3 | Status: SHIPPED | OUTPATIENT
Start: 2025-03-31

## 2025-03-31 RX ORDER — BLOOD SUGAR DIAGNOSTIC
STRIP MISCELLANEOUS
Qty: 200 STRIP | Refills: 3 | Status: SHIPPED | OUTPATIENT
Start: 2025-03-31

## 2025-03-31 RX ORDER — INSULIN GLARGINE 100 [IU]/ML
INJECTION, SOLUTION SUBCUTANEOUS
Qty: 12 ML | Refills: 3 | Status: SHIPPED | OUTPATIENT
Start: 2025-03-31

## 2025-04-15 RX ORDER — LORAZEPAM 0.5 MG/1
TABLET ORAL
Qty: 60 TABLET | OUTPATIENT
Start: 2025-04-15

## 2025-04-21 DIAGNOSIS — F41.9 ANXIETY: Primary | ICD-10-CM

## 2025-04-21 RX ORDER — LORAZEPAM 0.5 MG/1
0.5 TABLET ORAL EVERY 6 HOURS PRN
Qty: 12 TABLET | Refills: 0 | Status: SHIPPED | OUTPATIENT
Start: 2025-04-21 | End: 2025-04-22 | Stop reason: SDUPTHER

## 2025-04-22 ENCOUNTER — TELEPHONE (OUTPATIENT)
Dept: PRIMARY CARE CLINIC | Age: 89
End: 2025-04-22

## 2025-04-22 DIAGNOSIS — F41.9 ANXIETY: ICD-10-CM

## 2025-04-22 RX ORDER — LORAZEPAM 0.5 MG/1
0.5 TABLET ORAL EVERY 6 HOURS PRN
Qty: 60 TABLET | Refills: 0 | Status: SHIPPED | OUTPATIENT
Start: 2025-04-22 | End: 2025-05-22

## 2025-04-22 NOTE — TELEPHONE ENCOUNTER
Patient called in stating his Ativan RX was sent to the pharmacy but only 12 tablets were dispensed. Patient states he usually gets 60 tablets.    -Please advise

## 2025-04-28 RX ORDER — ATORVASTATIN CALCIUM 80 MG/1
80 TABLET, FILM COATED ORAL NIGHTLY
Qty: 90 TABLET | Refills: 3 | Status: SHIPPED | OUTPATIENT
Start: 2025-04-28

## 2025-07-16 ENCOUNTER — OFFICE VISIT (OUTPATIENT)
Dept: PRIMARY CARE CLINIC | Age: 89
End: 2025-07-16

## 2025-07-16 VITALS
SYSTOLIC BLOOD PRESSURE: 138 MMHG | HEIGHT: 70 IN | OXYGEN SATURATION: 98 % | HEART RATE: 70 BPM | BODY MASS INDEX: 25.62 KG/M2 | WEIGHT: 179 LBS | DIASTOLIC BLOOD PRESSURE: 84 MMHG

## 2025-07-16 DIAGNOSIS — E11.9 TYPE 2 DIABETES MELLITUS WITHOUT COMPLICATION, WITH LONG-TERM CURRENT USE OF INSULIN (HCC): Primary | ICD-10-CM

## 2025-07-16 DIAGNOSIS — F41.9 ANXIETY: ICD-10-CM

## 2025-07-16 DIAGNOSIS — Z79.4 TYPE 2 DIABETES MELLITUS WITHOUT COMPLICATION, WITH LONG-TERM CURRENT USE OF INSULIN (HCC): Primary | ICD-10-CM

## 2025-07-16 DIAGNOSIS — R60.0 LOCALIZED EDEMA: ICD-10-CM

## 2025-07-16 LAB — HBA1C MFR BLD: 7.4 %

## 2025-07-16 RX ORDER — LORAZEPAM 0.5 MG/1
TABLET ORAL
COMMUNITY
Start: 2025-06-07 | End: 2025-07-16 | Stop reason: SDUPTHER

## 2025-07-16 RX ORDER — LORAZEPAM 0.5 MG/1
0.5 TABLET ORAL EVERY 6 HOURS PRN
Qty: 90 TABLET | Refills: 0 | Status: SHIPPED | OUTPATIENT
Start: 2025-07-16 | End: 2025-10-14

## 2025-07-16 ASSESSMENT — ENCOUNTER SYMPTOMS
CHEST TIGHTNESS: 1
SHORTNESS OF BREATH: 1

## 2025-07-16 NOTE — PROGRESS NOTES
[facility]  [facility]  [address] [address]  Dept: [phone]    Dawson Childers is a 89 y.o. male Established patient, who presents today for his medical conditions/complaints as noted below.      Chief Complaint   Patient presents with    Diabetes     DM check up.        HPI:     History of Present Illness  The patient presents for evaluation of diabetes, dizziness and headaches, and pedal edema.    He reports inconsistent blood sugar levels, with a period of low readings around 50 daily about a month ago. This led him to temporarily stop insulin use for 3 to 4 days. However, his blood sugar levels have since increased, even though he has not made any changes to his diet or lifestyle. He is currently on a daily dose of 15 units of insulin, which he occasionally increases to 16 or 17 units when his blood sugar is slightly elevated. He has discontinued metformin.    He experiences occasional chest pain and discomfort, along with shortness of breath during physical exertion such as climbing stairs. He also reports episodes of dizziness and lightheadedness. He was previously prescribed lorazepam by Dr. Gillette following a transient ischemic attack (TIA), which he found helpful in managing his headaches and dizziness. He typically takes one tablet daily, which effectively controls these symptoms. However, he has noticed a reduction in the number of tablets provided by his pharmacy, from 60 to 14, which he believes may be due to insurance restrictions. He last refilled his prescription about a month ago and is considering switching to a mail-order service for his medications. He reports no abdominal pain.    He has noticed swelling in his feet, which he believes is being exacerbated by his support hose. He continues to take furosemide daily, except when traveling, as it causes sudden urges to urinate. He finds that the medication is effective, but only